# Patient Record
Sex: FEMALE | Race: WHITE | Employment: OTHER | URBAN - NONMETROPOLITAN AREA
[De-identification: names, ages, dates, MRNs, and addresses within clinical notes are randomized per-mention and may not be internally consistent; named-entity substitution may affect disease eponyms.]

---

## 2017-04-14 PROBLEM — I21.4 NSTEMI (NON-ST ELEVATED MYOCARDIAL INFARCTION) (HCC): Status: ACTIVE | Noted: 2017-04-14

## 2017-04-22 PROBLEM — Z95.1 S/P CABG (CORONARY ARTERY BYPASS GRAFT): Status: ACTIVE | Noted: 2017-04-22

## 2017-04-24 PROBLEM — I50.21 ACUTE SYSTOLIC CONGESTIVE HEART FAILURE (HCC): Status: ACTIVE | Noted: 2017-04-24

## 2017-04-24 PROBLEM — E78.5 HYPERLIPIDEMIA: Status: ACTIVE | Noted: 2017-04-24

## 2017-04-24 PROBLEM — I25.5 CARDIOMYOPATHY, ISCHEMIC: Status: ACTIVE | Noted: 2017-04-24

## 2017-04-24 PROBLEM — E66.9 OBESITY: Status: ACTIVE | Noted: 2017-04-24

## 2017-05-31 PROBLEM — K92.2 UGIB (UPPER GASTROINTESTINAL BLEED): Status: ACTIVE | Noted: 2017-05-31

## 2018-03-22 ENCOUNTER — HOSPITAL ENCOUNTER (EMERGENCY)
Age: 73
Discharge: HOME | End: 2018-03-22
Payer: MEDICARE

## 2018-03-22 ENCOUNTER — APPOINTMENT (OUTPATIENT)
Dept: GENERAL RADIOLOGY | Age: 73
End: 2018-03-22
Payer: MEDICARE

## 2018-03-22 VITALS
BODY MASS INDEX: 27.6 KG/M2 | HEART RATE: 92 BPM | HEIGHT: 62 IN | DIASTOLIC BLOOD PRESSURE: 58 MMHG | WEIGHT: 150 LBS | TEMPERATURE: 98 F | OXYGEN SATURATION: 98 % | SYSTOLIC BLOOD PRESSURE: 114 MMHG | RESPIRATION RATE: 16 BRPM

## 2018-03-22 DIAGNOSIS — K56.41 FECAL IMPACTION (HCC): Primary | ICD-10-CM

## 2018-03-22 DIAGNOSIS — K59.00 CONSTIPATION, UNSPECIFIED CONSTIPATION TYPE: ICD-10-CM

## 2018-03-22 PROCEDURE — 99283 EMERGENCY DEPT VISIT LOW MDM: CPT

## 2018-03-22 PROCEDURE — 74019 RADEX ABDOMEN 2 VIEWS: CPT

## 2018-03-22 PROCEDURE — 6370000000 HC RX 637 (ALT 250 FOR IP): Performed by: EMERGENCY MEDICINE

## 2018-03-22 RX ORDER — MAGNESIUM CARB/ALUMINUM HYDROX 105-160MG
TABLET,CHEWABLE ORAL
Status: DISCONTINUED
Start: 2018-03-22 | End: 2018-03-22 | Stop reason: HOSPADM

## 2018-03-22 RX ORDER — DOCUSATE SODIUM 100 MG/1
100 CAPSULE, LIQUID FILLED ORAL 2 TIMES DAILY
Qty: 60 CAPSULE | Refills: 0 | Status: ON HOLD | OUTPATIENT
Start: 2018-03-22 | End: 2020-02-14

## 2018-03-22 RX ORDER — MAGNESIUM CARB/ALUMINUM HYDROX 105-160MG
45 TABLET,CHEWABLE ORAL ONCE
Status: COMPLETED | OUTPATIENT
Start: 2018-03-22 | End: 2018-03-22

## 2018-03-22 RX ADMIN — Medication 45 ML: at 07:10

## 2018-03-22 NOTE — ED NOTES
Soapsuds enema given to patient with 60 ml of mineral oil in it as ordered by Dr. Olu Garcia, RN  03/22/18 7478

## 2019-09-17 ENCOUNTER — HOSPITAL ENCOUNTER (OUTPATIENT)
Dept: NON INVASIVE DIAGNOSTICS | Age: 74
Discharge: HOME | End: 2019-09-17
Payer: MEDICARE

## 2019-09-17 LAB
LV EF: 38 %
LVEF MODALITY: NORMAL

## 2019-09-17 PROCEDURE — 93306 TTE W/DOPPLER COMPLETE: CPT

## 2020-02-13 ENCOUNTER — HOSPITAL ENCOUNTER (INPATIENT)
Age: 75
LOS: 1 days | Discharge: HOME | DRG: 293 | End: 2020-02-15
Attending: INTERNAL MEDICINE | Admitting: INTERNAL MEDICINE
Payer: MEDICARE

## 2020-02-13 ENCOUNTER — APPOINTMENT (OUTPATIENT)
Dept: GENERAL RADIOLOGY | Age: 75
DRG: 293 | End: 2020-02-13
Payer: MEDICARE

## 2020-02-13 LAB
ALBUMIN SERPL-MCNC: 3.8 G/DL (ref 3.5–5.2)
ALP BLD-CCNC: 106 U/L (ref 35–104)
ALT SERPL-CCNC: 42 U/L (ref 0–32)
ANION GAP SERPL CALCULATED.3IONS-SCNC: 16 MMOL/L (ref 7–16)
AST SERPL-CCNC: 39 U/L (ref 0–31)
BILIRUB SERPL-MCNC: 1 MG/DL (ref 0–1.2)
BUN BLDV-MCNC: 17 MG/DL (ref 8–23)
CALCIUM SERPL-MCNC: 8.9 MG/DL (ref 8.6–10.2)
CHLORIDE BLD-SCNC: 107 MMOL/L (ref 98–107)
CO2: 17 MMOL/L (ref 22–29)
CREAT SERPL-MCNC: 0.9 MG/DL (ref 0.5–1)
GFR AFRICAN AMERICAN: >60
GFR NON-AFRICAN AMERICAN: >60 ML/MIN/1.73
GLUCOSE BLD-MCNC: 169 MG/DL (ref 74–99)
HCT VFR BLD CALC: 41.5 % (ref 34–48)
HEMOGLOBIN: 12.9 G/DL (ref 11.5–15.5)
INR BLD: 2.9
MCH RBC QN AUTO: 29.5 PG (ref 26–35)
MCHC RBC AUTO-ENTMCNC: 31.1 % (ref 32–34.5)
MCV RBC AUTO: 94.7 FL (ref 80–99.9)
PDW BLD-RTO: 13.4 FL (ref 11.5–15)
PLATELET # BLD: 193 E9/L (ref 130–450)
PMV BLD AUTO: 9.2 FL (ref 7–12)
POTASSIUM SERPL-SCNC: 4.2 MMOL/L (ref 3.5–5)
PRO-BNP: 4939 PG/ML (ref 0–450)
PROTHROMBIN TIME: 33.4 SEC (ref 9.3–12.4)
RBC # BLD: 4.38 E12/L (ref 3.5–5.5)
SODIUM BLD-SCNC: 140 MMOL/L (ref 132–146)
TOTAL PROTEIN: 6.7 G/DL (ref 6.4–8.3)
TROPONIN: <0.01 NG/ML (ref 0–0.03)
WBC # BLD: 10.1 E9/L (ref 4.5–11.5)

## 2020-02-13 PROCEDURE — 36415 COLL VENOUS BLD VENIPUNCTURE: CPT

## 2020-02-13 PROCEDURE — 80053 COMPREHEN METABOLIC PANEL: CPT

## 2020-02-13 PROCEDURE — 84484 ASSAY OF TROPONIN QUANT: CPT

## 2020-02-13 PROCEDURE — 85027 COMPLETE CBC AUTOMATED: CPT

## 2020-02-13 PROCEDURE — 93005 ELECTROCARDIOGRAM TRACING: CPT | Performed by: EMERGENCY MEDICINE

## 2020-02-13 PROCEDURE — 99285 EMERGENCY DEPT VISIT HI MDM: CPT

## 2020-02-13 PROCEDURE — 71045 X-RAY EXAM CHEST 1 VIEW: CPT

## 2020-02-13 PROCEDURE — 85610 PROTHROMBIN TIME: CPT

## 2020-02-13 PROCEDURE — 83880 ASSAY OF NATRIURETIC PEPTIDE: CPT

## 2020-02-14 PROBLEM — I50.9 HEART FAILURE (HCC): Status: ACTIVE | Noted: 2020-02-14

## 2020-02-14 PROBLEM — I50.23 ACUTE ON CHRONIC SYSTOLIC HEART FAILURE (HCC): Status: ACTIVE | Noted: 2020-02-14

## 2020-02-14 PROBLEM — I48.91 ATRIAL FIBRILLATION WITH RVR (HCC): Status: ACTIVE | Noted: 2020-02-14

## 2020-02-14 LAB
ANION GAP SERPL CALCULATED.3IONS-SCNC: 13 MMOL/L (ref 7–16)
BUN BLDV-MCNC: 14 MG/DL (ref 8–23)
CALCIUM SERPL-MCNC: 8.7 MG/DL (ref 8.6–10.2)
CHLORIDE BLD-SCNC: 105 MMOL/L (ref 98–107)
CO2: 22 MMOL/L (ref 22–29)
CREAT SERPL-MCNC: 0.8 MG/DL (ref 0.5–1)
EKG ATRIAL RATE: 119 BPM
EKG Q-T INTERVAL: 370 MS
EKG QRS DURATION: 124 MS
EKG QTC CALCULATION (BAZETT): 457 MS
EKG R AXIS: 99 DEGREES
EKG T AXIS: -101 DEGREES
EKG VENTRICULAR RATE: 92 BPM
GFR AFRICAN AMERICAN: >60
GFR NON-AFRICAN AMERICAN: >60 ML/MIN/1.73
GLUCOSE BLD-MCNC: 124 MG/DL (ref 74–99)
INR BLD: 2.9
LV EF: 35 %
LVEF MODALITY: NORMAL
POTASSIUM SERPL-SCNC: 3.5 MMOL/L (ref 3.5–5)
PROTHROMBIN TIME: 33.5 SEC (ref 9.3–12.4)
SODIUM BLD-SCNC: 140 MMOL/L (ref 132–146)
TSH SERPL DL<=0.05 MIU/L-ACNC: 4.63 UIU/ML (ref 0.27–4.2)

## 2020-02-14 PROCEDURE — 80048 BASIC METABOLIC PNL TOTAL CA: CPT

## 2020-02-14 PROCEDURE — 93306 TTE W/DOPPLER COMPLETE: CPT

## 2020-02-14 PROCEDURE — 96374 THER/PROPH/DIAG INJ IV PUSH: CPT

## 2020-02-14 PROCEDURE — 97161 PT EVAL LOW COMPLEX 20 MIN: CPT

## 2020-02-14 PROCEDURE — 6370000000 HC RX 637 (ALT 250 FOR IP): Performed by: INTERNAL MEDICINE

## 2020-02-14 PROCEDURE — 99222 1ST HOSP IP/OBS MODERATE 55: CPT | Performed by: INTERNAL MEDICINE

## 2020-02-14 PROCEDURE — 97165 OT EVAL LOW COMPLEX 30 MIN: CPT

## 2020-02-14 PROCEDURE — 85610 PROTHROMBIN TIME: CPT

## 2020-02-14 PROCEDURE — 36415 COLL VENOUS BLD VENIPUNCTURE: CPT

## 2020-02-14 PROCEDURE — 6360000002 HC RX W HCPCS: Performed by: INTERNAL MEDICINE

## 2020-02-14 PROCEDURE — 2140000000 HC CCU INTERMEDIATE R&B

## 2020-02-14 PROCEDURE — 6360000002 HC RX W HCPCS: Performed by: EMERGENCY MEDICINE

## 2020-02-14 PROCEDURE — 2580000003 HC RX 258: Performed by: INTERNAL MEDICINE

## 2020-02-14 PROCEDURE — 96376 TX/PRO/DX INJ SAME DRUG ADON: CPT

## 2020-02-14 PROCEDURE — 84443 ASSAY THYROID STIM HORMONE: CPT

## 2020-02-14 RX ORDER — SODIUM CHLORIDE 0.9 % (FLUSH) 0.9 %
10 SYRINGE (ML) INJECTION PRN
Status: DISCONTINUED | OUTPATIENT
Start: 2020-02-14 | End: 2020-02-15 | Stop reason: HOSPADM

## 2020-02-14 RX ORDER — LISINOPRIL 5 MG/1
2.5 TABLET ORAL DAILY
Status: DISCONTINUED | OUTPATIENT
Start: 2020-02-14 | End: 2020-02-15

## 2020-02-14 RX ORDER — FUROSEMIDE 10 MG/ML
20 INJECTION INTRAMUSCULAR; INTRAVENOUS 2 TIMES DAILY
Status: DISCONTINUED | OUTPATIENT
Start: 2020-02-14 | End: 2020-02-15 | Stop reason: HOSPADM

## 2020-02-14 RX ORDER — ONDANSETRON 2 MG/ML
4 INJECTION INTRAMUSCULAR; INTRAVENOUS EVERY 6 HOURS PRN
Status: DISCONTINUED | OUTPATIENT
Start: 2020-02-14 | End: 2020-02-15 | Stop reason: HOSPADM

## 2020-02-14 RX ORDER — WARFARIN SODIUM 5 MG/1
5 TABLET ORAL
Status: ON HOLD | COMMUNITY
End: 2020-06-27 | Stop reason: SDUPTHER

## 2020-02-14 RX ORDER — CARVEDILOL 6.25 MG/1
12.5 TABLET ORAL 2 TIMES DAILY WITH MEALS
Status: DISCONTINUED | OUTPATIENT
Start: 2020-02-14 | End: 2020-02-15 | Stop reason: HOSPADM

## 2020-02-14 RX ORDER — ATORVASTATIN CALCIUM 40 MG/1
40 TABLET, FILM COATED ORAL DAILY
Status: DISCONTINUED | OUTPATIENT
Start: 2020-02-14 | End: 2020-02-14

## 2020-02-14 RX ORDER — PANTOPRAZOLE SODIUM 40 MG/1
40 TABLET, DELAYED RELEASE ORAL
Status: DISCONTINUED | OUTPATIENT
Start: 2020-02-14 | End: 2020-02-14

## 2020-02-14 RX ORDER — DICYCLOMINE HYDROCHLORIDE 10 MG/1
10 CAPSULE ORAL
Status: DISCONTINUED | OUTPATIENT
Start: 2020-02-14 | End: 2020-02-14

## 2020-02-14 RX ORDER — SODIUM CHLORIDE 0.9 % (FLUSH) 0.9 %
10 SYRINGE (ML) INJECTION EVERY 12 HOURS SCHEDULED
Status: DISCONTINUED | OUTPATIENT
Start: 2020-02-14 | End: 2020-02-15 | Stop reason: HOSPADM

## 2020-02-14 RX ORDER — FUROSEMIDE 10 MG/ML
20 INJECTION INTRAMUSCULAR; INTRAVENOUS ONCE
Status: COMPLETED | OUTPATIENT
Start: 2020-02-14 | End: 2020-02-14

## 2020-02-14 RX ORDER — FOLIC ACID 1 MG/1
1 TABLET ORAL DAILY
Status: DISCONTINUED | OUTPATIENT
Start: 2020-02-14 | End: 2020-02-14

## 2020-02-14 RX ORDER — POTASSIUM CHLORIDE 750 MG/1
10 TABLET, EXTENDED RELEASE ORAL DAILY
Status: DISCONTINUED | OUTPATIENT
Start: 2020-02-14 | End: 2020-02-14

## 2020-02-14 RX ORDER — ASPIRIN 81 MG/1
81 TABLET ORAL DAILY
Status: DISCONTINUED | OUTPATIENT
Start: 2020-02-14 | End: 2020-02-14

## 2020-02-14 RX ORDER — WARFARIN SODIUM 5 MG/1
2.5 TABLET ORAL
Status: COMPLETED | OUTPATIENT
Start: 2020-02-14 | End: 2020-02-14

## 2020-02-14 RX ORDER — DOCUSATE SODIUM 100 MG/1
100 CAPSULE, LIQUID FILLED ORAL 2 TIMES DAILY
Status: DISCONTINUED | OUTPATIENT
Start: 2020-02-14 | End: 2020-02-14

## 2020-02-14 RX ORDER — FERROUS SULFATE 325(65) MG
325 TABLET ORAL 2 TIMES DAILY WITH MEALS
Status: DISCONTINUED | OUTPATIENT
Start: 2020-02-14 | End: 2020-02-14

## 2020-02-14 RX ORDER — CLOPIDOGREL BISULFATE 75 MG/1
75 TABLET ORAL DAILY
Status: DISCONTINUED | OUTPATIENT
Start: 2020-02-14 | End: 2020-02-14

## 2020-02-14 RX ADMIN — Medication 10 ML: at 12:20

## 2020-02-14 RX ADMIN — FUROSEMIDE 20 MG: 10 INJECTION, SOLUTION INTRAMUSCULAR; INTRAVENOUS at 12:14

## 2020-02-14 RX ADMIN — CARVEDILOL 12.5 MG: 6.25 TABLET, FILM COATED ORAL at 18:59

## 2020-02-14 RX ADMIN — FUROSEMIDE 20 MG: 10 INJECTION, SOLUTION INTRAMUSCULAR; INTRAVENOUS at 18:59

## 2020-02-14 RX ADMIN — WARFARIN SODIUM 2.5 MG: 5 TABLET ORAL at 18:59

## 2020-02-14 RX ADMIN — LISINOPRIL 2.5 MG: 5 TABLET ORAL at 09:22

## 2020-02-14 RX ADMIN — CARVEDILOL 12.5 MG: 6.25 TABLET, FILM COATED ORAL at 09:22

## 2020-02-14 RX ADMIN — FUROSEMIDE 20 MG: 10 INJECTION, SOLUTION INTRAMUSCULAR; INTRAVENOUS at 00:25

## 2020-02-14 ASSESSMENT — PAIN SCALES - GENERAL
PAINLEVEL_OUTOF10: 0

## 2020-02-14 NOTE — PROGRESS NOTES
OCCUPATIONAL THERAPY INITIAL EVALUATION      Date:2020  Patient Name: Taylor Pathak  MRN: [id number]  : 1945  Room: [address] [place] [address]    Evaluating OT: KIMBERLEE Wylie, OTR/L [id number]    AM-PAC Daily Activity Raw Score:   Recommended Adaptive Equipment: TBD     Diagnosis: heart failure   Referring Practitioner: Alvera Angelucci, DO  Surgery: n/a  Pertinent Medical History: CAD, CABG ()   Precautions:  Falls     Home Living: Pt lives alone (but niece checks in intermittently in a basement level apartment with 4 step(s) to enter and 0 rail(s); bed/bath on 1 level when entering apartment   Bathroom setup: grab bars, tub/shower unit   Equipment owned: none  Prior Level of Function: IND with ADLs/IADLs; using no AD for functional mobility   Driving: yes    Pain Level: 0/10  Cognition: A&O: 4/4; Follows multi step directions   Memory:  good    Sequencing:  good    Problem solving:  good    Judgement/safety:  fair      Functional Assessment:   Initial Eval Status  Date: 20 Treatment Status  Date: Short Term Goals  Treatment frequency: 2-5x/wk   Feeding IND      Grooming SUP(Standing at sink)   IND   UB Dressing Mod I   SUP   LB Dressing SUP (pt able to use crossing of leg technique to doff/heaven B socks)  IND    Bathing SUP (simulated)  IND    Toileting SUP  IND   Bed Mobility  Log roll: IND  Supine to sit: IND   Sit to supine: IND      Functional Transfers Sit to stand:SUP   Stand to sit:SUP  Commode: SUP  IND   Functional Mobility SUP (no AD, to/from bathroom)  IND   Balance Sitting:     Static:  IND    Dynamic:SUP  Standing: SUP     Activity Tolerance fair     Visual/  Perceptual Glasses: yes                Hand dominance: R  UE ROM: RUE:  WFL  LUE:  WFL  Strength: RUE: grossly 4-/5 LUE: grossly 4-/5   Strength: B WFL  Fine Motor Coordination:  WFL     Hearing: WFL  Sensation:  No c/o numbness/tingling  Tone:  WFL  Edema: BLEs                            Comments:Cleared by RN to see pt.  Upon arrival, patient sitting in chair and agreeable to OT session. At end of session, patient sitting in chair with call light and phone within reach, all lines and tubes intact. Pt appeared to have tolerated session well. Pt appears motivated/cooperative/pleasant. Pt would benefit from continued OT to increase functional independence and quality of life. Eval Complexity: Low    Assessment of current deficits   Functional mobility [x]  ADLs [x] Strength [x]  Cognition []  Functional transfers  [x] IADLs [x] Safety Awareness [x]  Endurance [x]  Fine Motor Coordination [] Balance [x] Vision/perception [] Sensation []   Gross Motor Coordination [] ROM [] Delirium []                  Motor Control []    Plan of Care:   ADL retraining [x]   Equipment needs [x]   Neuromuscular re-education [x] Energy Conservation Techniques [x]  Functional Transfer training [x] Patient and/or Family Education [x]  Functional Mobility training [x]  Environmental Modifications [x]  Cognitive re-training []   Compensatory techniques for ADLs [x]  Splinting Needs []   Positioning to improve overall function [x]   Therapeutic Activity [x]  Therapeutic Exercise  [x]  Visual/Perceptual: []    Delirium prevention/treatment  []   Other:  []    Rehab Potential: Good for established goals, pt. assisted in establishment of goals. LTG: maximize independence with ADLs to return to PLOF    Patient instructed on diagnosis, prognosis/goals and plan of care. Demonstrated fair understanding. [] Malnutrition indicators have been identified and nursing has been notified to ensure a dietitian consult is ordered. Evaluation time includes thorough review of current medical information, gathering information on past medical & social history & PLOF, completion of standardized testing, informal observation of tasks, consultation with other medical professions/disciplines, assessment of data & development of POC/goals.      low Evaluation      Time In: 4:20 Time Out: 4:30           Treatment Charges: Mins Units   Ther Ex  [id number]       Manual Therapy 89131       Thera Activities 20855       ADL/Home Mgt [id number]     Neuro Re-ed 53001       Group Therapy        Orthotic manage/training  21866       Non-Billable Time       Total Timed Treatment 0 0       Jose Anders, OTR/L [id number]

## 2020-02-14 NOTE — PLAN OF CARE
Problem: Falls - Risk of:  Goal: Will remain free from falls  Outcome: Met This Shift  Goal: Absence of physical injury  Outcome: Met This Shift     Problem: OXYGENATION/RESPIRATORY FUNCTION  Goal: Patient will maintain patent airway  Outcome: Met This Shift  Goal: Patient will achieve/maintain normal respiratory rate/effort  Outcome: Met This Shift     Problem: HEMODYNAMIC STATUS  Goal: Patient has stable vital signs and fluid balance  Outcome: Met This Shift     Problem: FLUID AND ELECTROLYTE IMBALANCE  Goal: Fluid and electrolyte balance are achieved/maintained  Outcome: Met This Shift

## 2020-02-14 NOTE — ED NOTES
Bed: 14B-14  Expected date:   Expected time:   Means of arrival:   Comments:  triage     Yue Jc RN  02/13/20 9141

## 2020-02-14 NOTE — CONSULTS
Weak, dizzy,  Presyn, palpitation,   Edema. [facility] consult  Dr. Christina Chi      Reason for Consult: CHF  Requesting Physician: Robbie Mast MD  CHIEF COMPLAINT: Dizziness  History Obtained From: patient  HISTORY OF PRESENT ILLNESS:   Patient is a 77years old female with a history of CAD status post CABG in 2017, cardiomyopathy, chronic systolic congestive heart failure and hyperlipidemia, was admitted to the hospital with dizziness,Was found to have acute exacerbation of congestive heart failure, cardiology  was consulted. Patient stated for the endoscopy of this were to admission she's been having lightheadedness and dizziness, feeling fatigued and tired, progressive shortness of breath, pedal edema, Palpitations,easy fatigability, symptoms started Sunday, got progressively worse, did not respond to outpatient therapy, under the day of admission symptoms were significant enough that prompted her to seek medical attention,Denies any chest pain, no pedal edema, no PND, no orthopnea, no syncope episodes.       Past Medical History:   Diagnosis Date    Acute systolic congestive heart failure ([address] 4/24/2017    CAD (coronary artery disease)     History of echocardiogram 04/14/2017    EF 26%    Hyperlipidemia 4/24/2017       Past Surgical History:   Procedure Laterality Date    CARDIAC CATHETERIZATION  04/17/2017    Dr. Frieda Krishnan GRAFT  04/17/2017         Current Facility-Administered Medications:     carvedilol (COREG) tablet 12.5 mg, 12.5 mg, Oral, BID WC, Nicolabecca Conde, DO, 12.5 mg at 02/15/20 7181    lisinopril (PRINIVIL;ZESTRIL) tablet 2.5 mg, 2.5 mg, Oral, Daily, Nicola Conde, DO, 2.5 mg at 02/15/20 6607    sodium chloride flush 0.9 % injection 10 mL, 10 mL, Intravenous, 2 times per day, Nicola Amaya, DO, 10 mL at 02/15/20 5850    sodium chloride flush 0.9 % injection 10 mL, 10 mL, Intravenous, PRN, Nicola Conde DO    magnesium hydroxide (MILK OF MAGNESIA) 400 MG/5ML suspension 30 mL, 30 mL, Oral, Daily PRN, Virginia Held, DO    ondansetron [facility]) injection 4 mg, 4 mg, Intravenous, Q6H PRN, Virginia Held, DO    furosemide (LASIX) injection 20 mg, 20 mg, Intravenous, BID, Virginia Held, DO, 20 mg at 02/15/20 3829    perflutren lipid microspheres (DEFINITY) injection 1.65 mg, 1.5 mL, Intravenous, ONCE PRN, Swati Staley MD    warfarin (COUMADIN) daily dosing (placeholder), , Other, RX Placeholder, Bubba Miguel MD    Allergies as of 2020    (No Known Allergies)       Social History     Socioeconomic History    Marital status:       Spouse name: Not on file    Number of children: Not on file    Years of education: Not on file    Highest education level: Not on file   Occupational History    Not on file   Social Needs    Financial resource strain: Not on file    Food insecurity:     Worry: Not on file     Inability: Not on file    Transportation needs:     Medical: Not on file     Non-medical: Not on file   Tobacco Use    Smoking status: Former Smoker     Last attempt to quit: 2002     Years since quittin.8    Smokeless tobacco: Never Used   Substance and Sexual Activity    Alcohol use: No    Drug use: No    Sexual activity: Never   Lifestyle    Physical activity:     Days per week: Not on file     Minutes per session: Not on file    Stress: Not on file   Relationships    Social connections:     Talks on phone: Not on file     Gets together: Not on file     Attends Rastafari service: Not on file     Active member of club or organization: Not on file     Attends meetings of clubs or organizations: Not on file     Relationship status: Not on file    Intimate partner violence:     Fear of current or ex partner: Not on file     Emotionally abused: Not on file     Physically abused: Not on file     Forced sexual activity: Not on file   Other Topics Concern    Not on file   Social History Narrative    Not on file       Family medical history for CAD      REVIEW OF SYSTEMS:     CONSTITUTIONAL:  negative for  fevers, chills, sweats, +fatigue  EYES:  negative for  double vision, blurred vision and blind spots  HEENT:  negative for  tinnitus, earaches, nasal congestion and epistaxis  RESPIRATORY:  +  dry cough, cough with sputum, dyspnea, Negative for wheezing and hemoptysis  CARDIOVASCULAR: as per HPI  GASTROINTESTINAL:  negative for nausea, vomiting, diarrhea, constipation, pruritus and jaundice  GENITOURINARY:  negative for frequency, dysuria, nocturia, urinary incontinence and hesitancy  HEMATOLOGIC/LYMPHATIC:  negative for easy bruising, bleeding, lymphadenopathy and petechiae  ALLERGIC/IMMUNOLOGIC:  negative for urticaria, hay fever and angioedema  ENDOCRINE:  negative for heat intolerance, cold intolerance, tremor, hair loss and diabetic symptoms including neither polyuria nor polydipsia nor blurred vision  MUSCULOSKELETAL:  negative for  myalgias, arthralgias, joint swelling, stiff joints and decreased range of motion  NEUROLOGICAL:  negative for memory problems, speech problems, visual disturbance, dysphagia, + Generalized weakness      PHYSICAL EXAM:   CONSTITUTIONAL:  awake, alert, cooperative, no apparent distress, and appears stated age  EYES:  lids and lashes normal and pupils equal, round and reactive to light, anicteric sclerae  HEAD:  normocepalic, without obvious abnormality, atraumatic, pink, moist mucous membranes.   NECK:  Supple, symmetrical, trachea midline, no adenopathy, thyroid symmetric, not enlarged and no tenderness, skin normal  HEMATOLOGIC/LYMPHATICS:  no cervical lymphadenopathy and no supraclavicular lymphadenopathy  LUNGS:  No increased work of breathing, good air exchange, clear to auscultation bilaterally, no crackles or wheezing  CARDIOVASCULAR:  Normal apical impulse,Irregularly irregular, normal S1 and S2, no S3 , 3/6 systolic murmur at the apex, 3/6 systolic murmur at the left lower sternal border,no JVD, no carotid bruit, + pedal edema, good carotid upstroke bilaterally. ABDOMEN:  Soft, nontender, no masses, no hepatomegaly or splenomegaly, BS+  CHEST: nontender to palpation, expands symmetrically  MUSCULOSKELETAL:  No clubbing no cyanosis. there is no redness, warmth, or swelling of the joints  full range of motion noted  NEUROLOGIC:  Alert, awake,oriented x3, no focal neurologic deficit was appreciated  SKIN:  no bruising or bleeding, normal skin color, texture, turgor and no redness, warmth, or swelling        /60   Pulse 84   Temp 98 °F (36.7 °C) (Temporal)   Resp 18   Ht 5' 2\" (1.575 m)   Wt 167 lb 3 oz (75.8 kg)   SpO2 95%   BMI 30.58 kg/m²     DATA:   I personally reviewed the admission EKG with the following interpretation:Atrial fibrillation with a controlled ventricular response, nonspecific intraventricular conduction delay, nonspecific T-wave changes, when compared to previous, atrial fibrillation has replaced sinus rhythm    ECHO: 2/14/2020,Summary   No previous echo for comparison. Technically adequate study. Normal left ventricular wall thickness. Ejection fraction is visually estimated at 35%. Abnormal (paradoxical) motion consistent with conduction abnormality. Mild to moderate mitral regurgitation is present. Moderate tricuspid regurgitation.    RVSP is normal.       Stress Test: Not performed to date  Angiography: Not performed to date  Cardiology Labs:   BMP:    Lab Results   Component Value Date     02/15/2020    K 3.8 02/15/2020     02/15/2020    CO2 22 02/15/2020    BUN 15 02/15/2020     CMP:    Lab Results   Component Value Date     02/15/2020    K 3.8 02/15/2020     02/15/2020    CO2 22 02/15/2020    BUN 15 02/15/2020    PROT 6.7 02/13/2020     CBC:    Lab Results   Component Value Date    WBC 9.4 02/15/2020    RBC 4.21 02/15/2020    HGB 12.6 02/15/2020    HCT 40.1 02/15/2020    MCV 95.2 02/15/2020    RDW 13.4 02/15/2020 treatment  2. Continue aspirin  3. Will increase Coreg dose if heart rate is not controlled  4. need life vest, Repeat echocardiogram in 3 months to reevaluate her ejection fraction and to evaluate her candidacy for ICD  5. Further recommendations will be forthcoming pending her clinical course and diagnostic test findings    I have reviewed my findings and recommendations with patient    Thank you for the consult  Electronically signed by Lawrence Canseco MD on 2/15/2020 at 10:06 AM  NOTE: This report was transcribed using voice recognition software.  Every effort was made to ensure accuracy; however, inadvertent computerized transcription errors may be present

## 2020-02-14 NOTE — H&P
[address] [facility]  History and Physical      CHIEF COMPLAINT: Lightheadedness      HISTORY OF PRESENT ILLNESS:      The patient is a 77 y.o. female patient of Dr. Mendoza Rear history of CHF, CAD, A. fib on Coumadin who presents with lightheadedness. Patient has had 2 days of worsening lightheadedness associated with some shortness of breath. States just before going to bed she got the feeling that she could not breath. Since then the SOB has resolved, she is still having some lightheadedness.  + Orthopnea or PND. Heart palpitations and racing.     Past Medical History:    Past Medical History:   Diagnosis Date    Acute systolic congestive heart failure ([address] 4/24/2017    CAD (coronary artery disease)     History of echocardiogram 04/14/2017    EF 26%    Hyperlipidemia 4/24/2017       Past Surgical History:    Past Surgical History:   Procedure Laterality Date    CARDIAC CATHETERIZATION  04/17/2017    Dr. Nestor Arroyo  04/17/2017       Medications Prior to Admission:    Medications Prior to Admission: warfarin (COUMADIN) 5 MG tablet, Take 5 mg by mouth Pt  is taking 5 mg alternating with 2.5 mg every other day  [DISCONTINUED] docusate sodium (DULCOLAX) 100 MG capsule, Take 1 capsule by mouth 2 times daily  [DISCONTINUED] Glycerin, Laxative, (GLYCERIN, ADULT,) 2 g SUPP suppository, Place 1 suppository rectally daily as needed for Constipation  [DISCONTINUED] dicyclomine (BENTYL) 10 MG capsule, Take 1 capsule by mouth 4 times daily (before meals and nightly)  [DISCONTINUED] loperamide (RA ANTI-DIARRHEAL) 2 MG capsule, Take 1 capsule by mouth 4 times daily as needed for Diarrhea  [DISCONTINUED] ferrous sulfate 325 (65 FE) MG tablet, Take 1 tablet by mouth 2 times daily (with meals)  [DISCONTINUED] pantoprazole (PROTONIX) 40 MG tablet, Take 1 tablet by mouth 2 times daily  lisinopril (PRINIVIL;ZESTRIL) 2.5 MG tablet, Take 1 tablet by mouth daily  carvedilol (COREG) 12.5 MG tablet, Take 1 tablet by mouth 2 times daily (with meals)  [DISCONTINUED] aspirin 81 MG EC tablet, Take 1 tablet by mouth daily  [DISCONTINUED] furosemide (LASIX) 20 MG tablet, Take 1 tablet by mouth daily  [DISCONTINUED] potassium chloride (KLOR-CON M) 10 MEQ extended release tablet, Take 1 tablet by mouth daily  [DISCONTINUED] folic acid (FOLVITE) 1 MG tablet, Take 1 tablet by mouth daily  [DISCONTINUED] clopidogrel (PLAVIX) 75 MG tablet, Take 1 tablet by mouth daily  [DISCONTINUED] vitamin C (VITAMIN C) 500 MG tablet, Take 1 tablet by mouth 2 times daily  [DISCONTINUED] atorvastatin (LIPITOR) 40 MG tablet, Take 1 tablet by mouth daily    Allergies:    Patient has no known allergies. Social History:    reports that she quit smoking about 17 years ago. She has never used smokeless tobacco. She reports that she does not drink alcohol or use drugs. Family History:   family history is not on file. REVIEW OF SYSTEMS:  As above in the HPI, otherwise negative    PHYSICAL EXAM:    Vitals:  /82   Pulse 74   Temp 97.4 °F (36.3 °C) (Temporal)   Resp 20   Ht 5' 2\" (1.575 m)   Wt 173 lb (78.5 kg)   SpO2 97%   BMI 31.64 kg/m²     General:  Awake, alert, oriented X 3. Well developed, well nourished, well groomed. No apparent distress. HEENT:  Normocephalic, atraumatic. Pupils equal, round, reactive to light. No scleral icterus. No conjunctival injection. Normal lips, teeth, and gums. No nasal discharge. Neck:  Supple  Heart:  RRR, no murmurs, gallops, rubs  Lungs:  CTA bilaterally, bilat symmetrical expansion, no wheeze, rales, or rhonchi  Abdomen:   Bowel sounds present, soft, nontender, no masses, no organomegaly, no peritoneal signs  Extremities:  No clubbing, cyanosis, or edema  Skin:  Warm and dry, no open lesions or rash  Neuro:  Cranial nerves 2-12 intact, no focal deficits  Breast: deferred  Rectal: deferred  Genitalia:  deferred    LABS:    CBC with Differential:    Lab Results   Component Value Date    WBC 10.1 02/13/2020    RBC 4.38 02/13/2020    HGB 12.9 02/13/2020    HCT 41.5 02/13/2020     02/13/2020    MCV 94.7 02/13/2020    MCH 29.5 02/13/2020    MCHC 31.1 02/13/2020    RDW 13.4 02/13/2020    LYMPHOPCT 14.1 01/26/2018    MONOPCT 10.5 01/26/2018    BASOPCT 0.2 01/26/2018    MONOSABS 1.30 01/26/2018    LYMPHSABS 1.75 01/26/2018    EOSABS 0.04 01/26/2018    BASOSABS 0.03 01/26/2018     CMP:    Lab Results   Component Value Date     02/14/2020    K 3.5 02/14/2020     02/14/2020    CO2 22 02/14/2020    BUN 14 02/14/2020    CREATININE 0.8 02/14/2020    GFRAA >60 02/14/2020    LABGLOM >60 02/14/2020    GLUCOSE 124 02/14/2020    PROT 6.7 02/13/2020    LABALBU 3.8 02/13/2020    CALCIUM 8.7 02/14/2020    BILITOT 1.0 02/13/2020    ALKPHOS 106 02/13/2020    AST 39 02/13/2020    ALT 42 02/13/2020     Magnesium:    Lab Results   Component Value Date    MG 2.3 04/21/2017     Phosphorus:    Lab Results   Component Value Date    PHOS 3.2 04/15/2017     PT/INR:    Lab Results   Component Value Date    PROTIME 33.5 02/14/2020    INR 2.9 02/14/2020     Last 3 Troponin:    Lab Results   Component Value Date    TROPONINI <0.01 02/13/2020    TROPONINI <0.01 11/06/2017    TROPONINI 0.58 04/16/2017     U/A:    Lab Results   Component Value Date    COLORU Yellow 01/26/2018    PROTEINU Negative 01/26/2018    PHUR 5.0 01/26/2018    LABCAST RARE 01/26/2018    WBCUA 2-5 01/26/2018    RBCUA 2-5 01/26/2018    MUCUS Present 01/26/2018    BACTERIA MANY 01/26/2018    CLARITYU SL CLOUDY 01/26/2018    SPECGRAV 1.020 01/26/2018    LEUKOCYTESUR SMALL 01/26/2018    UROBILINOGEN 0.2 01/26/2018    BILIRUBINUR SMALL 01/26/2018    BLOODU MODERATE 01/26/2018    GLUCOSEU Negative 01/26/2018     ABG:    Lab Results   Component Value Date    PH 7.396 04/20/2017    PCO2 39.6 04/20/2017    PO2 110.1 04/20/2017    HCO3 23.8 04/20/2017    BE -1.0 04/20/2017    O2SAT 98.4 04/20/2017     HgBA1c:    Lab Results   Component Value

## 2020-02-14 NOTE — PLAN OF CARE
Problem: Falls - Risk of:  Goal: Will remain free from falls  Description  Will remain free from falls  2/14/2020 1604 by Michael Avendano RN  Outcome: Met This Shift  2/14/2020 0240 by Rodger Gay RN  Outcome: Met This Shift  Goal: Absence of physical injury  Description  Absence of physical injury  2/14/2020 1604 by Michael Avendano RN  Outcome: Met This Shift  2/14/2020 0240 by Rodger Gay RN  Outcome: Met This Shift

## 2020-02-14 NOTE — PROGRESS NOTES
Physical Therapy  Physical Therapy Initial Assessment     Name: Janet Dominguez  : 1945  MRN: [id number]    Referring Provider:  Meghan Alas DO    Date of Service: 2020    Evaluating PT:  Chantelle Beltran, PT, DPT [id number]    Room #:  4410/0767-Q  Diagnosis:  Heart failure  Precautions: Falls  Procedure/Surgery:  NA  PMHx/PSHx:  CAD, CHF, HLD  Equipment Needs:  None at this time    SUBJECTIVE:    Pt lives alone in a basement level apartment with 4 stairs to enter and no rails. Pt ambulated with no device and was independent PTA. Pt reported being a \"walker\" and active. OBJECTIVE:   Initial Evaluation  Date: 20 Treatment Short Term/ Long Term   Goals   AM-PAC 6 Clicks      Was pt agreeable to Eval/treatment? Yes     Does pt have pain? No c/o pain     Bed Mobility  Rolling: NT  Supine to sit: Mod Independent  Sit to supine: NT  Scooting: Independent     Transfers Sit to stand: Supervision  Stand to sit: Supervision  Stand pivot: Supervision no device  Independent   Ambulation   200 feet with Supervision no device  >400 feet Independently   Stair negotiation: ascended and descended 4 steps with 1 rail Superivison  >4 steps with 1 rail Mod Independent   ROM BUE:  Defer to OT note  BLE:  WNL     Strength BUE:  Defer to OT note  BLE:  4/5  Increase by 1/3 MMT grade   Balance Sitting EOB:  Independent  Dynamic Standing:  Supervision no device  Sitting EOB:  NA  Dynamic Standing:  Independent     Pt is A & O x 4  Sensation:  WNL  Edema:  WNL    Therapeutic Exercises:  NA    Patient education  Pt educated on safety    Patient response to education:   Pt verbalized understanding Pt demonstrated skill Pt requires further education in this area   x x x     ASSESSMENT:    Comments:  Pt was supine in bed upon arrival, agreeable to initial evaluation. Pt was educated on static positioning after position changes to decrease chance of symptoms (lightheadedness, dizziness).   Pt ambulated with decreased gait speed and reached for hallway railing occasionally. Reciprocal pattern observed when ascending steps and non-reciprocal pattern when descending. Pt was returned to sitting in chair with all needs met and call light in reach. Will add to gait team.    Treatment:  Patient practiced and was instructed in the following treatment:     Sitting EOB for >3 minutes to ensure no occurrence of symptoms   Educated on static positioning after position changes    Pt's/ family goals   1. Return home    Patient and or family understand(s) diagnosis, prognosis, and plan of care. Yes    PLAN:    PT care will be provided in accordance with the objectives noted above. Exercises and functional mobility practice will be used as well as appropriate assistive devices or modalities to obtain goals. Patient and family education will also be administered as needed. Frequency of treatments: 2-5x/week x 1-2 weeks. Time in  0800  Time out  0810    Total Treatment Time  0 minutes     Evaluation Time includes thorough review of current medical information, gathering information on past medical history/social history and prior level of function, completion of standardized testing/informal observation of tasks, assessment of data and education on plan of care and goals.     CPT codes:  [x] Low Complexity PT evaluation 86075  [] Moderate Complexity PT evaluation 98722  [] High Complexity PT evaluation 84588  [] PT Re-evaluation 36982  [] Gait training 77270 - minutes  [] Manual therapy 01124 - minutes  [] Therapeutic activities 89682 - minutes  [] Therapeutic exercises 37005 - minutes  [] Neuromuscular reeducation 76421 - minutes     Melissa Coe, PT, DPT  [id number]

## 2020-02-14 NOTE — ED PROVIDER NOTES
- 16 mmol/L    Glucose 169 (H) 74 - 99 mg/dL    BUN 17 8 - 23 mg/dL    CREATININE 0.9 0.5 - 1.0 mg/dL    GFR Non-African American >60 >=60 mL/min/1.73    GFR African American >60     Calcium 8.9 8.6 - 10.2 mg/dL    Total Protein 6.7 6.4 - 8.3 g/dL    Alb 3.8 3.5 - 5.2 g/dL    Total Bilirubin 1.0 0.0 - 1.2 mg/dL    Alkaline Phosphatase 106 (H) 35 - 104 U/L    ALT 42 (H) 0 - 32 U/L    AST 39 (H) 0 - 31 U/L   Troponin   Result Value Ref Range    Troponin <0.01 0.00 - 0.03 ng/mL   Brain Natriuretic Peptide   Result Value Ref Range    Pro-BNP 4,939 (H) 0 - 450 pg/mL   Protime-INR   Result Value Ref Range    Protime 33.4 (H) 9.3 - 12.4 sec    INR 2.9    EKG 12 Lead   Result Value Ref Range    Ventricular Rate 92 BPM    Atrial Rate 119 BPM    QRS Duration 124 ms    Q-T Interval 370 ms    QTc Calculation (Bazett) 457 ms    R Axis 99 degrees    T Axis -101 degrees       RADIOLOGY:  Interpreted by Radiologist unless otherwise specified. XR CHEST PORTABLE   Final Result   Discrete residual changes or minimal left-sided pleural   effusion. Borderline size heart.          ------------------------- NURSING NOTES AND VITALS REVIEWED ---------------------------  The nursing notes within the ED encounter and vital signs as below have been reviewed by myself. BP (!) 146/85   Pulse 78   Temp 97.6 °F (36.4 °C) (Temporal)   Resp 20   Ht 5' 2\" (1.575 m)   Wt 173 lb (78.5 kg)   SpO2 96%   BMI 31.64 kg/m²   Oxygen Saturation Interpretation: Normal    The cardiac monitor revealed NSR with a heart rate in the 100s as interpreted by me. The cardiac monitor was ordered secondary to the patient's heart rate and to monitor the patient for dysrhythmia. CPT 58433    The patients available past medical records and past encounters were reviewed.       ------------------------------ ED COURSE/MEDICAL DECISION MAKING----------------------  Medications   aspirin EC tablet 81 mg (has no administration in time range)   atorvastatin addition to providing specific details for the plan of care and counseling regarding the diagnosis and prognosis. Questions are answered at this time and they are agreeable with the plan.    --------------------------------- IMPRESSION AND DISPOSITION ---------------------------------  IMPRESSION  1. Congestive heart failure, unspecified HF chronicity, unspecified heart failure type ([facility] [address]    2. Near syncope        DISPOSITION  Disposition: Admit to telemetry  Patient condition is stable    2/13/20, 11:09 PM.    This note is prepared by Rafa Rosenthal, acting as Scribe for Jennifer Macario MD.    Jennifer Macario MD:  The scribe's documentation has been prepared under my direction and personally reviewed by me in its entirety. I confirm that the note above accurately reflects all work, treatment, procedures, and medical decision making performed by me.              Jennifer Macario MD  02/14/20 3340       Jennifer Macario MD  02/14/20 8630       Jennifer Macario MD  02/14/20 2698

## 2020-02-15 VITALS
TEMPERATURE: 98 F | RESPIRATION RATE: 18 BRPM | SYSTOLIC BLOOD PRESSURE: 138 MMHG | HEIGHT: 62 IN | HEART RATE: 88 BPM | OXYGEN SATURATION: 95 % | WEIGHT: 167.19 LBS | DIASTOLIC BLOOD PRESSURE: 82 MMHG | BODY MASS INDEX: 30.77 KG/M2

## 2020-02-15 LAB
ANION GAP SERPL CALCULATED.3IONS-SCNC: 13 MMOL/L (ref 7–16)
BASOPHILS ABSOLUTE: 0.05 E9/L (ref 0–0.2)
BASOPHILS RELATIVE PERCENT: 0.5 % (ref 0–2)
BUN BLDV-MCNC: 15 MG/DL (ref 8–23)
CALCIUM SERPL-MCNC: 8.5 MG/DL (ref 8.6–10.2)
CHLORIDE BLD-SCNC: 102 MMOL/L (ref 98–107)
CO2: 22 MMOL/L (ref 22–29)
CREAT SERPL-MCNC: 0.9 MG/DL (ref 0.5–1)
EOSINOPHILS ABSOLUTE: 0.12 E9/L (ref 0.05–0.5)
EOSINOPHILS RELATIVE PERCENT: 1.3 % (ref 0–6)
GFR AFRICAN AMERICAN: >60
GFR NON-AFRICAN AMERICAN: >60 ML/MIN/1.73
GLUCOSE BLD-MCNC: 106 MG/DL (ref 74–99)
HCT VFR BLD CALC: 40.1 % (ref 34–48)
HEMOGLOBIN: 12.6 G/DL (ref 11.5–15.5)
IMMATURE GRANULOCYTES #: 0.02 E9/L
IMMATURE GRANULOCYTES %: 0.2 % (ref 0–5)
INR BLD: 2.2
LYMPHOCYTES ABSOLUTE: 4.89 E9/L (ref 1.5–4)
LYMPHOCYTES RELATIVE PERCENT: 52.2 % (ref 20–42)
MCH RBC QN AUTO: 29.9 PG (ref 26–35)
MCHC RBC AUTO-ENTMCNC: 31.4 % (ref 32–34.5)
MCV RBC AUTO: 95.2 FL (ref 80–99.9)
MONOCYTES ABSOLUTE: 0.64 E9/L (ref 0.1–0.95)
MONOCYTES RELATIVE PERCENT: 6.8 % (ref 2–12)
NEUTROPHILS ABSOLUTE: 3.65 E9/L (ref 1.8–7.3)
NEUTROPHILS RELATIVE PERCENT: 39 % (ref 43–80)
PDW BLD-RTO: 13.4 FL (ref 11.5–15)
PLATELET # BLD: 165 E9/L (ref 130–450)
PMV BLD AUTO: 8.8 FL (ref 7–12)
POTASSIUM REFLEX MAGNESIUM: 3.8 MMOL/L (ref 3.5–5)
PROTHROMBIN TIME: 25.2 SEC (ref 9.3–12.4)
RBC # BLD: 4.21 E12/L (ref 3.5–5.5)
REASON FOR REJECTION: NORMAL
REJECTED TEST: NORMAL
SODIUM BLD-SCNC: 137 MMOL/L (ref 132–146)
WBC # BLD: 9.4 E9/L (ref 4.5–11.5)

## 2020-02-15 PROCEDURE — 85610 PROTHROMBIN TIME: CPT

## 2020-02-15 PROCEDURE — 85025 COMPLETE CBC W/AUTO DIFF WBC: CPT

## 2020-02-15 PROCEDURE — 2580000003 HC RX 258: Performed by: INTERNAL MEDICINE

## 2020-02-15 PROCEDURE — 99232 SBSQ HOSP IP/OBS MODERATE 35: CPT | Performed by: INTERNAL MEDICINE

## 2020-02-15 PROCEDURE — 36415 COLL VENOUS BLD VENIPUNCTURE: CPT

## 2020-02-15 PROCEDURE — 96376 TX/PRO/DX INJ SAME DRUG ADON: CPT

## 2020-02-15 PROCEDURE — 6370000000 HC RX 637 (ALT 250 FOR IP): Performed by: INTERNAL MEDICINE

## 2020-02-15 PROCEDURE — 6360000002 HC RX W HCPCS: Performed by: INTERNAL MEDICINE

## 2020-02-15 PROCEDURE — 80048 BASIC METABOLIC PNL TOTAL CA: CPT

## 2020-02-15 RX ORDER — FUROSEMIDE 20 MG/1
20 TABLET ORAL DAILY
Qty: 30 TABLET | Refills: 1 | Status: SHIPPED | OUTPATIENT
Start: 2020-02-15 | End: 2020-02-15

## 2020-02-15 RX ORDER — LISINOPRIL 5 MG/1
5 TABLET ORAL DAILY
Status: DISCONTINUED | OUTPATIENT
Start: 2020-02-16 | End: 2020-02-15 | Stop reason: HOSPADM

## 2020-02-15 RX ORDER — ATORVASTATIN CALCIUM 40 MG/1
40 TABLET, FILM COATED ORAL DAILY
Qty: 30 TABLET | Refills: 3 | Status: SHIPPED | OUTPATIENT
Start: 2020-02-15 | End: 2020-02-15

## 2020-02-15 RX ORDER — ATORVASTATIN CALCIUM 40 MG/1
40 TABLET, FILM COATED ORAL DAILY
Qty: 30 TABLET | Refills: 3 | Status: SHIPPED | OUTPATIENT
Start: 2020-02-15

## 2020-02-15 RX ORDER — FUROSEMIDE 20 MG/1
20 TABLET ORAL DAILY
Qty: 30 TABLET | Refills: 1 | Status: SHIPPED | OUTPATIENT
Start: 2020-02-15 | End: 2020-09-02 | Stop reason: SDUPTHER

## 2020-02-15 RX ADMIN — CARVEDILOL 12.5 MG: 6.25 TABLET, FILM COATED ORAL at 09:04

## 2020-02-15 RX ADMIN — CARVEDILOL 12.5 MG: 6.25 TABLET, FILM COATED ORAL at 16:56

## 2020-02-15 RX ADMIN — FUROSEMIDE 20 MG: 10 INJECTION, SOLUTION INTRAMUSCULAR; INTRAVENOUS at 09:04

## 2020-02-15 RX ADMIN — Medication 10 ML: at 09:04

## 2020-02-15 RX ADMIN — FUROSEMIDE 20 MG: 10 INJECTION, SOLUTION INTRAMUSCULAR; INTRAVENOUS at 17:01

## 2020-02-15 RX ADMIN — LISINOPRIL 2.5 MG: 5 TABLET ORAL at 09:04

## 2020-02-15 ASSESSMENT — PAIN SCALES - GENERAL
PAINLEVEL_OUTOF10: 0
PAINLEVEL_OUTOF10: 0

## 2020-02-15 NOTE — PLAN OF CARE
Problem: Falls - Risk of:  Goal: Will remain free from falls  Description  Will remain free from falls  Outcome: Met This Shift     Problem: OXYGENATION/RESPIRATORY FUNCTION  Goal: Patient will maintain patent airway  Outcome: Met This Shift     Problem: FLUID AND ELECTROLYTE IMBALANCE  Goal: Fluid and electrolyte balance are achieved/maintained  Outcome: Ongoing

## 2020-02-15 NOTE — PROGRESS NOTES
Subjective:  Feeling better   No CP or SOB  No fever or chills   No uncontrolled pain  No vomiting or diarrhea     Objective:    /60   Pulse 84   Temp 98 °F (36.7 °C) (Temporal)   Resp 18   Ht 5' 2\" (1.575 m)   Wt 167 lb 3 oz (75.8 kg)   SpO2 95%   BMI 30.58 kg/m²     24HR INTAKE/OUTPUT:      Intake/Output Summary (Last 24 hours) at 2/15/2020 1259  Last data filed at 2/15/2020 1048  Gross per 24 hour   Intake 840 ml   Output 2850 ml   Net -2010 ml     nad  Heart:  RRR, no murmurs, gallops, or rubs. Lungs:  CTA bilaterally, no wheeze, rales or rhonchi  Abd: bowel sounds present, nontender, nondistended, no masses  Extrem:  No clubbing, cyanosis, or edema    Most Recent Labs  Lab Results   Component Value Date    WBC 9.4 02/15/2020    HGB 12.6 02/15/2020    HCT 40.1 02/15/2020     02/15/2020     02/15/2020    K 3.8 02/15/2020     02/15/2020    CREATININE 0.9 02/15/2020    BUN 15 02/15/2020    CO2 22 02/15/2020    GLUCOSE 106 (H) 02/15/2020    ALT 42 (H) 02/13/2020    AST 39 (H) 02/13/2020    INR 2.2 02/15/2020    TSH 4.630 (H) 02/14/2020    LABA1C 5.7 04/15/2017     No results for input(s): MG in the last 72 hours. Lab Results   Component Value Date    CALCIUM 8.5 (L) 02/15/2020    PHOS 3.2 04/15/2017        XR CHEST PORTABLE   Final Result   Discrete residual changes or minimal left-sided pleural   effusion. Borderline size heart. Assessment    Principal Problem:    Acute on chronic systolic heart failure (HCC)  Active Problems:    Cardiomyopathy, ischemic    Obesity    Hyperlipidemia    Atrial fibrillation with RVR ([address]  Resolved Problems:    * No resolved hospital problems. *      Plan:  Admit monitored bed  IV Lasix  Monitor I's and O's  Daily weights  Monitor labs closely   Echo 9/2019- EF 35-40%.  Global wall hypokinesis   Indeterminate diastolic function.   Cardiology consult  Medications for other co morbidities cont as appropriate w dosage adjustments as

## 2020-02-15 NOTE — DISCHARGE SUMMARY
Physician Discharge Summary     Patient ID:  Kelsey Joaquin  [id number]  77 y.o.  1945    Admit date: 2/13/2020    Discharge date and time:  2/15/2020    Discharge Diagnoses: Principal Problem:    Acute on chronic systolic heart failure (HCC)  Active Problems:    Cardiomyopathy, ischemic    Obesity    Hyperlipidemia    Atrial fibrillation with RVR ([address]  Resolved Problems:    * No resolved hospital problems. *      Consults: IP CONSULT TO HOSPITALIST  IP CONSULT TO CARDIOLOGY  IP CONSULT TO HOME CARE NEEDS    Procedures: nad    Hospital Course: Admit monitored bed  IV Lasix  Monitor I's and O's  Daily weights  Monitor labs closely   Echo 9/2019- EF 35-40%.  Global wall hypokinesis   Indeterminate diastolic function.   Cardiology Consult appreciated =-- GDMT adjusted  Medications for other co morbidities cont as appropriate w dosage adjustments as necessary  PT/OT  DVT PPx  DC planning-- pt declines lifevest RBA iscussed    Discharge Exam:  See progress note from today    Condition:  Stable    Disposition: home    Patient Instructions:   Current Discharge Medication List      CONTINUE these medications which have CHANGED    Details   atorvastatin (LIPITOR) 40 MG tablet Take 1 tablet by mouth daily  Qty: 30 tablet, Refills: 3      furosemide (LASIX) 20 MG tablet Take 1 tablet by mouth daily  Qty: 30 tablet, Refills: 1         CONTINUE these medications which have NOT CHANGED    Details   warfarin (COUMADIN) 5 MG tablet Take 5 mg by mouth Pt  is taking 5 mg alternating with 2.5 mg every other day      lisinopril (PRINIVIL;ZESTRIL) 2.5 MG tablet Take 1 tablet by mouth daily  Qty: 30 tablet, Refills: 1      carvedilol (COREG) 12.5 MG tablet Take 1 tablet by mouth 2 times daily (with meals)  Qty: 60 tablet, Refills: 1         STOP taking these medications       docusate sodium (DULCOLAX) 100 MG capsule Comments:   Reason for Stopping:         Glycerin, Laxative, (GLYCERIN, ADULT,) 2 g SUPP suppository Comments:

## 2020-02-15 NOTE — PROGRESS NOTES
improving. She still has mild lower extremity edema but is asking to be discharged. I discussed with Dr. Amber Phillips and will give her 1 more dose of IV Lasix this afternoon and then return her to her usual oral dose beginning tomorrow for discharge. 5. Mild to moderate mitral regurgitation  6. Hypertension, well controlled. With her cardiomyopathy I will increase her lisinopril from 2.5 to 5 mg.

## 2020-02-17 ENCOUNTER — TELEPHONE (OUTPATIENT)
Dept: CARDIOLOGY CLINIC | Age: 75
End: 2020-02-17

## 2020-02-20 ENCOUNTER — TELEPHONE (OUTPATIENT)
Dept: CARDIOLOGY CLINIC | Age: 75
End: 2020-02-20

## 2020-02-20 NOTE — TELEPHONE ENCOUNTER
Called pt to schedule the hospital f/u and she said she has to check with her niece. She asked if it's mandatory because she has trouble with directions. I said was strongly suggest and she said okay she'll check with her niece.  She's going to call me back

## 2020-03-01 NOTE — PROGRESS NOTES
[facility] Cardiology Progress Note  Dr. Maribel Howard      Referring Physician: Holly Layne MD  CHIEF COMPLAINT:   Chief Complaint   Patient presents with    Follow-Up from [facility]. Pt has no cardiac complaints today       HISTORY OF PRESENT ILLNESS:   Patient is a 77years old female with a history of CAD status post CABG in 2017, atrial fibrillation, cardiomyopathy, chronic systolic congestive heart failure and hyperlipidemia, is here for a follow up visit. Patient denies any chest pain, no shortness of breath, no lightheadedness, no dizziness, no palpitations, no pedal edema, no PND, no orthopnea, no syncope, no presyncopal episodes. Past Medical History:   Diagnosis Date    Acute systolic congestive heart failure ([address] 4/24/2017    Atrial fibrillation (HCC)     CAD (coronary artery disease)     History of echocardiogram 04/14/2017    EF 26%    Hyperlipidemia 4/24/2017    Ischemic cardiomyopathy     Non-rheumatic tricuspid valve insufficiency     Nonrheumatic mitral (valve) insufficiency          Past Surgical History:   Procedure Laterality Date    CARDIAC CATHETERIZATION  04/17/2017    Dr. Lele Patel GRAFT  04/17/2017    DIAGNOSTIC CARDIAC CATH LAB PROCEDURE  04/17/2017    Dr. Brianna Munguia         Current Outpatient Medications   Medication Sig Dispense Refill    furosemide (LASIX) 20 MG tablet Take 1 tablet by mouth daily 30 tablet 1    atorvastatin (LIPITOR) 40 MG tablet Take 1 tablet by mouth daily 30 tablet 3    warfarin (COUMADIN) 5 MG tablet Take 5 mg by mouth Pt  is taking 5 mg alternating with 2.5 mg every other day      lisinopril (PRINIVIL;ZESTRIL) 2.5 MG tablet Take 1 tablet by mouth daily 90 tablet 3    carvedilol (COREG) 12.5 MG tablet Take 1 tablet by mouth 2 times daily (with meals) 180 tablet 3     No current facility-administered medications for this visit.           Allergies as of 03/09/2020    (No Known Allergies)       Social History Socioeconomic History    Marital status:      Spouse name: Not on file    Number of children: Not on file    Years of education: Not on file    Highest education level: Not on file   Occupational History    Not on file   Social Needs    Financial resource strain: Not on file    Food insecurity     Worry: Not on file     Inability: Not on file    Transportation needs     Medical: Not on file     Non-medical: Not on file   Tobacco Use    Smoking status: Former Smoker     Last attempt to quit: 2002     Years since quittin.9    Smokeless tobacco: Never Used   Substance and Sexual Activity    Alcohol use: No     Comment: 1 pot coffee per day    Drug use: No    Sexual activity: Never   Lifestyle    Physical activity     Days per week: Not on file     Minutes per session: Not on file    Stress: Not on file   Relationships    Social connections     Talks on phone: Not on file     Gets together: Not on file     Attends [employer] service: Not on file     Active member of club or organization: Not on file     Attends meetings of clubs or organizations: Not on file     Relationship status: Not on file    Intimate partner violence     Fear of current or ex partner: Not on file     Emotionally abused: Not on file     Physically abused: Not on file     Forced sexual activity: Not on file   Other Topics Concern    Not on file   Social History Narrative    Not on file       History reviewed. No pertinent family history.     REVIEW OF SYSTEMS:     CONSTITUTIONAL:  negative for  fevers, chills, sweats and fatigue  HEENT:  negative for  tinnitus, earaches, nasal congestion and epistaxis  RESPIRATORY:  negative for  dry cough, cough with sputum, dyspnea, wheezing and hemoptysis  GASTROINTESTINAL:  negative for nausea, vomiting, diarrhea, constipation, pruritus and jaundice  HEMATOLOGIC/LYMPHATIC:  negative for easy bruising, bleeding, lymphadenopathy and petechiae  ENDOCRINE:  negative for heat above: IMPRESSION:  1. Atrial fibrillation: Persistent,  Rate is controlled, [id number] score of 5, on Coumadin. 2.   Systolic congestive heart failure: Compensated, continue current treatment  3. CAD: Status post CABG in April 2017 with a LIMA to LAD, SVG to left PLV, doing well. 4. Cardiomyopathy: Ischemic  5. Mild-to-moderate mitral valve regurgitation  6. ModerateTricuspid valve regurgitation  7. Hypertension: Controlled  8. Chronic anticoagulation    RECOMMENDATIONS:     1. Continue current treatment  2. Preventive cardiology: Low-salt, low-cholesterol diet, daily exercise, total cholesterol of less than 200, LDL of less than 70,were all advised. 3.  CHF: Daily weight, take an extra Lasix for weight gain of more than 2-3 pounds in 24 hours, compliance with diuretics, low-salt diet were all advised. 4.  Compliance with the Coumadin dose, PT and INR check appointments was strongly advised  5. Increase risk of bleeding due to being on anti-coagulation, symptoms and signs of bleeding discussed with patient, patient was advised to seek medical attention at the earliest symptoms or signs of bleeding. 6.  Follow-up with Dr. Monahan Needs as scheduled  7. Follow-up with Dr. Martin Wolff in 6 months, sooner if symptomatic for any reason    I have reviewed my findings and recommendations with patient    Electronically signed by Mattie Suarez MD on 3/29/2020 at 3:02 PM  NOTE: This report was transcribed using voice recognition software.  Every effort was made to ensure accuracy; however, inadvertent computerized transcription errors may be present

## 2020-03-09 ENCOUNTER — OFFICE VISIT (OUTPATIENT)
Dept: CARDIOLOGY CLINIC | Age: 75
End: 2020-03-09
Payer: MEDICARE

## 2020-03-09 VITALS
SYSTOLIC BLOOD PRESSURE: 138 MMHG | HEART RATE: 54 BPM | WEIGHT: 158 LBS | HEIGHT: 62 IN | BODY MASS INDEX: 29.08 KG/M2 | DIASTOLIC BLOOD PRESSURE: 78 MMHG

## 2020-03-09 PROCEDURE — 99214 OFFICE O/P EST MOD 30 MIN: CPT | Performed by: INTERNAL MEDICINE

## 2020-03-09 PROCEDURE — 93000 ELECTROCARDIOGRAM COMPLETE: CPT | Performed by: INTERNAL MEDICINE

## 2020-03-12 VITALS
BODY MASS INDEX: 30.36 KG/M2 | DIASTOLIC BLOOD PRESSURE: 74 MMHG | HEART RATE: 68 BPM | SYSTOLIC BLOOD PRESSURE: 118 MMHG | HEIGHT: 62 IN | WEIGHT: 165 LBS

## 2020-03-16 RX ORDER — LISINOPRIL 2.5 MG/1
2.5 TABLET ORAL DAILY
Qty: 90 TABLET | Refills: 3 | Status: SHIPPED
Start: 2020-03-16 | End: 2020-03-17 | Stop reason: SDUPTHER

## 2020-03-16 RX ORDER — CARVEDILOL 12.5 MG/1
12.5 TABLET ORAL 2 TIMES DAILY WITH MEALS
Qty: 180 TABLET | Refills: 3 | Status: SHIPPED
Start: 2020-03-16 | End: 2020-03-19 | Stop reason: SDUPTHER

## 2020-03-19 RX ORDER — CARVEDILOL 12.5 MG/1
12.5 TABLET ORAL 2 TIMES DAILY WITH MEALS
Qty: 180 TABLET | Refills: 3 | Status: SHIPPED
Start: 2020-03-19 | End: 2020-09-02 | Stop reason: SDUPTHER

## 2020-03-19 RX ORDER — LISINOPRIL 2.5 MG/1
2.5 TABLET ORAL DAILY
Qty: 90 TABLET | Refills: 3 | Status: SHIPPED
Start: 2020-03-19 | End: 2020-03-31 | Stop reason: SDUPTHER

## 2020-03-31 RX ORDER — LISINOPRIL 2.5 MG/1
2.5 TABLET ORAL DAILY
Qty: 90 TABLET | Refills: 3 | Status: ON HOLD | OUTPATIENT
Start: 2020-03-31 | End: 2020-06-27 | Stop reason: SDUPTHER

## 2020-06-26 ENCOUNTER — APPOINTMENT (OUTPATIENT)
Dept: GENERAL RADIOLOGY | Age: 75
End: 2020-06-26
Payer: MEDICARE

## 2020-06-26 ENCOUNTER — HOSPITAL ENCOUNTER (OUTPATIENT)
Age: 75
Setting detail: OBSERVATION
Discharge: HOME | End: 2020-06-27
Attending: EMERGENCY MEDICINE | Admitting: INTERNAL MEDICINE
Payer: MEDICARE

## 2020-06-26 ENCOUNTER — APPOINTMENT (OUTPATIENT)
Dept: ULTRASOUND IMAGING | Age: 75
End: 2020-06-26
Payer: MEDICARE

## 2020-06-26 PROBLEM — R07.9 CHEST PAIN: Status: ACTIVE | Noted: 2020-06-26

## 2020-06-26 PROBLEM — I50.21 ACUTE SYSTOLIC HEART FAILURE (HCC): Status: ACTIVE | Noted: 2020-06-26

## 2020-06-26 LAB
ALBUMIN SERPL-MCNC: 3.8 G/DL (ref 3.5–5.2)
ALP BLD-CCNC: 143 U/L (ref 35–104)
ALT SERPL-CCNC: 25 U/L (ref 0–32)
ANION GAP SERPL CALCULATED.3IONS-SCNC: 13 MMOL/L (ref 7–16)
ANISOCYTOSIS: ABNORMAL
AST SERPL-CCNC: 32 U/L (ref 0–31)
BASOPHILS ABSOLUTE: 0 E9/L (ref 0–0.2)
BASOPHILS RELATIVE PERCENT: 0 % (ref 0–2)
BILIRUB SERPL-MCNC: 2.3 MG/DL (ref 0–1.2)
BUN BLDV-MCNC: 17 MG/DL (ref 8–23)
CALCIUM SERPL-MCNC: 9 MG/DL (ref 8.6–10.2)
CHLORIDE BLD-SCNC: 105 MMOL/L (ref 98–107)
CO2: 19 MMOL/L (ref 22–29)
CREAT SERPL-MCNC: 0.9 MG/DL (ref 0.5–1)
EKG ATRIAL RATE: 163 BPM
EKG Q-T INTERVAL: 376 MS
EKG QRS DURATION: 120 MS
EKG QTC CALCULATION (BAZETT): 428 MS
EKG R AXIS: 60 DEGREES
EKG T AXIS: 154 DEGREES
EKG VENTRICULAR RATE: 78 BPM
EOSINOPHILS ABSOLUTE: 0 E9/L (ref 0.05–0.5)
EOSINOPHILS RELATIVE PERCENT: 0 % (ref 0–6)
GFR AFRICAN AMERICAN: >60
GFR NON-AFRICAN AMERICAN: >60 ML/MIN/1.73
GLUCOSE BLD-MCNC: 130 MG/DL (ref 74–99)
HCT VFR BLD CALC: 39.3 % (ref 34–48)
HEMOGLOBIN: 12.8 G/DL (ref 11.5–15.5)
INR BLD: 3.8
LYMPHOCYTES ABSOLUTE: 6.73 E9/L (ref 1.5–4)
LYMPHOCYTES RELATIVE PERCENT: 53 % (ref 20–42)
MCH RBC QN AUTO: 30.3 PG (ref 26–35)
MCHC RBC AUTO-ENTMCNC: 32.6 % (ref 32–34.5)
MCV RBC AUTO: 93.1 FL (ref 80–99.9)
MONOCYTES ABSOLUTE: 0.64 E9/L (ref 0.1–0.95)
MONOCYTES RELATIVE PERCENT: 5 % (ref 2–12)
NEUTROPHILS ABSOLUTE: 5.33 E9/L (ref 1.8–7.3)
NEUTROPHILS RELATIVE PERCENT: 42 % (ref 43–80)
OVALOCYTES: ABNORMAL
PDW BLD-RTO: 14.6 FL (ref 11.5–15)
PLATELET # BLD: 175 E9/L (ref 130–450)
PMV BLD AUTO: 9.2 FL (ref 7–12)
POIKILOCYTES: ABNORMAL
POLYCHROMASIA: ABNORMAL
POTASSIUM REFLEX MAGNESIUM: 4.3 MMOL/L (ref 3.5–5)
PRO-BNP: 6555 PG/ML (ref 0–450)
PROTHROMBIN TIME: 44.1 SEC (ref 9.3–12.4)
RBC # BLD: 4.22 E12/L (ref 3.5–5.5)
SODIUM BLD-SCNC: 137 MMOL/L (ref 132–146)
TOTAL PROTEIN: 7 G/DL (ref 6.4–8.3)
TROPONIN: <0.01 NG/ML (ref 0–0.03)
WBC # BLD: 12.7 E9/L (ref 4.5–11.5)

## 2020-06-26 PROCEDURE — 6370000000 HC RX 637 (ALT 250 FOR IP): Performed by: PHYSICIAN ASSISTANT

## 2020-06-26 PROCEDURE — 99214 OFFICE O/P EST MOD 30 MIN: CPT | Performed by: INTERNAL MEDICINE

## 2020-06-26 PROCEDURE — 76705 ECHO EXAM OF ABDOMEN: CPT

## 2020-06-26 PROCEDURE — 6360000002 HC RX W HCPCS: Performed by: EMERGENCY MEDICINE

## 2020-06-26 PROCEDURE — 80053 COMPREHEN METABOLIC PANEL: CPT

## 2020-06-26 PROCEDURE — 71045 X-RAY EXAM CHEST 1 VIEW: CPT

## 2020-06-26 PROCEDURE — 6360000002 HC RX W HCPCS: Performed by: PHYSICIAN ASSISTANT

## 2020-06-26 PROCEDURE — 93010 ELECTROCARDIOGRAM REPORT: CPT | Performed by: INTERNAL MEDICINE

## 2020-06-26 PROCEDURE — 2580000003 HC RX 258: Performed by: PHYSICIAN ASSISTANT

## 2020-06-26 PROCEDURE — 85025 COMPLETE CBC W/AUTO DIFF WBC: CPT

## 2020-06-26 PROCEDURE — 96372 THER/PROPH/DIAG INJ SC/IM: CPT

## 2020-06-26 PROCEDURE — 36415 COLL VENOUS BLD VENIPUNCTURE: CPT

## 2020-06-26 PROCEDURE — G0378 HOSPITAL OBSERVATION PER HR: HCPCS

## 2020-06-26 PROCEDURE — 84484 ASSAY OF TROPONIN QUANT: CPT

## 2020-06-26 PROCEDURE — 93005 ELECTROCARDIOGRAM TRACING: CPT | Performed by: EMERGENCY MEDICINE

## 2020-06-26 PROCEDURE — APPSS180 APP SPLIT SHARED TIME > 60 MINUTES: Performed by: NURSE PRACTITIONER

## 2020-06-26 PROCEDURE — 85610 PROTHROMBIN TIME: CPT

## 2020-06-26 PROCEDURE — 99285 EMERGENCY DEPT VISIT HI MDM: CPT

## 2020-06-26 PROCEDURE — 96374 THER/PROPH/DIAG INJ IV PUSH: CPT

## 2020-06-26 PROCEDURE — 83880 ASSAY OF NATRIURETIC PEPTIDE: CPT

## 2020-06-26 PROCEDURE — 6370000000 HC RX 637 (ALT 250 FOR IP): Performed by: EMERGENCY MEDICINE

## 2020-06-26 PROCEDURE — 2140000000 HC CCU INTERMEDIATE R&B

## 2020-06-26 RX ORDER — ACETAMINOPHEN 325 MG/1
650 TABLET ORAL EVERY 6 HOURS PRN
Status: DISCONTINUED | OUTPATIENT
Start: 2020-06-26 | End: 2020-06-27 | Stop reason: HOSPADM

## 2020-06-26 RX ORDER — ASPIRIN 81 MG/1
81 TABLET, CHEWABLE ORAL DAILY
Status: DISCONTINUED | OUTPATIENT
Start: 2020-06-27 | End: 2020-06-27 | Stop reason: HOSPADM

## 2020-06-26 RX ORDER — PROMETHAZINE HYDROCHLORIDE 25 MG/1
12.5 TABLET ORAL EVERY 6 HOURS PRN
Status: DISCONTINUED | OUTPATIENT
Start: 2020-06-26 | End: 2020-06-27 | Stop reason: HOSPADM

## 2020-06-26 RX ORDER — WARFARIN SODIUM 5 MG/1
5 TABLET ORAL DAILY
Status: DISCONTINUED | OUTPATIENT
Start: 2020-06-26 | End: 2020-06-27 | Stop reason: HOSPADM

## 2020-06-26 RX ORDER — FUROSEMIDE 20 MG/1
20 TABLET ORAL DAILY
Status: DISCONTINUED | OUTPATIENT
Start: 2020-06-26 | End: 2020-06-27 | Stop reason: HOSPADM

## 2020-06-26 RX ORDER — ASPIRIN 325 MG
325 TABLET ORAL ONCE
Status: COMPLETED | OUTPATIENT
Start: 2020-06-26 | End: 2020-06-26

## 2020-06-26 RX ORDER — DOCUSATE SODIUM 100 MG/1
100 CAPSULE, LIQUID FILLED ORAL 2 TIMES DAILY
COMMUNITY

## 2020-06-26 RX ORDER — ONDANSETRON 2 MG/ML
4 INJECTION INTRAMUSCULAR; INTRAVENOUS EVERY 6 HOURS PRN
Status: DISCONTINUED | OUTPATIENT
Start: 2020-06-26 | End: 2020-06-27 | Stop reason: HOSPADM

## 2020-06-26 RX ORDER — SODIUM CHLORIDE 0.9 % (FLUSH) 0.9 %
10 SYRINGE (ML) INJECTION EVERY 12 HOURS SCHEDULED
Status: DISCONTINUED | OUTPATIENT
Start: 2020-06-26 | End: 2020-06-27 | Stop reason: HOSPADM

## 2020-06-26 RX ORDER — ACETAMINOPHEN 650 MG/1
650 SUPPOSITORY RECTAL EVERY 6 HOURS PRN
Status: DISCONTINUED | OUTPATIENT
Start: 2020-06-26 | End: 2020-06-27 | Stop reason: HOSPADM

## 2020-06-26 RX ORDER — SODIUM CHLORIDE 0.9 % (FLUSH) 0.9 %
10 SYRINGE (ML) INJECTION PRN
Status: DISCONTINUED | OUTPATIENT
Start: 2020-06-26 | End: 2020-06-27 | Stop reason: HOSPADM

## 2020-06-26 RX ORDER — ATORVASTATIN CALCIUM 40 MG/1
40 TABLET, FILM COATED ORAL DAILY
Status: DISCONTINUED | OUTPATIENT
Start: 2020-06-26 | End: 2020-06-27 | Stop reason: HOSPADM

## 2020-06-26 RX ORDER — FUROSEMIDE 10 MG/ML
20 INJECTION INTRAMUSCULAR; INTRAVENOUS ONCE
Status: COMPLETED | OUTPATIENT
Start: 2020-06-26 | End: 2020-06-26

## 2020-06-26 RX ORDER — CARVEDILOL 6.25 MG/1
12.5 TABLET ORAL 2 TIMES DAILY WITH MEALS
Status: DISCONTINUED | OUTPATIENT
Start: 2020-06-26 | End: 2020-06-27 | Stop reason: HOSPADM

## 2020-06-26 RX ORDER — POLYETHYLENE GLYCOL 3350 17 G/17G
17 POWDER, FOR SOLUTION ORAL DAILY PRN
Status: DISCONTINUED | OUTPATIENT
Start: 2020-06-26 | End: 2020-06-27 | Stop reason: HOSPADM

## 2020-06-26 RX ORDER — POTASSIUM CHLORIDE 7.45 MG/ML
10 INJECTION INTRAVENOUS PRN
Status: DISCONTINUED | OUTPATIENT
Start: 2020-06-26 | End: 2020-06-27 | Stop reason: HOSPADM

## 2020-06-26 RX ORDER — POTASSIUM CHLORIDE 20 MEQ/1
40 TABLET, EXTENDED RELEASE ORAL PRN
Status: DISCONTINUED | OUTPATIENT
Start: 2020-06-26 | End: 2020-06-27 | Stop reason: HOSPADM

## 2020-06-26 RX ORDER — NITROGLYCERIN 0.4 MG/1
0.4 TABLET SUBLINGUAL EVERY 5 MIN PRN
Status: DISCONTINUED | OUTPATIENT
Start: 2020-06-26 | End: 2020-06-27 | Stop reason: HOSPADM

## 2020-06-26 RX ORDER — LISINOPRIL 5 MG/1
2.5 TABLET ORAL DAILY
Status: DISCONTINUED | OUTPATIENT
Start: 2020-06-26 | End: 2020-06-27

## 2020-06-26 RX ADMIN — CARVEDILOL 12.5 MG: 6.25 TABLET, FILM COATED ORAL at 18:29

## 2020-06-26 RX ADMIN — FUROSEMIDE 20 MG: 20 TABLET ORAL at 18:29

## 2020-06-26 RX ADMIN — SODIUM CHLORIDE, PRESERVATIVE FREE 10 ML: 5 INJECTION INTRAVENOUS at 20:08

## 2020-06-26 RX ADMIN — LISINOPRIL 2.5 MG: 5 TABLET ORAL at 18:30

## 2020-06-26 RX ADMIN — ENOXAPARIN SODIUM 40 MG: 40 INJECTION SUBCUTANEOUS at 18:30

## 2020-06-26 RX ADMIN — FUROSEMIDE 20 MG: 10 INJECTION, SOLUTION INTRAVENOUS at 08:52

## 2020-06-26 RX ADMIN — ASPIRIN 325 MG ORAL TABLET 325 MG: 325 PILL ORAL at 07:40

## 2020-06-26 RX ADMIN — ATORVASTATIN CALCIUM 40 MG: 40 TABLET, FILM COATED ORAL at 20:08

## 2020-06-26 ASSESSMENT — PAIN DESCRIPTION - DESCRIPTORS: DESCRIPTORS: SPASM

## 2020-06-26 ASSESSMENT — PAIN SCALES - GENERAL
PAINLEVEL_OUTOF10: 0
PAINLEVEL_OUTOF10: 5
PAINLEVEL_OUTOF10: 0
PAINLEVEL_OUTOF10: 0

## 2020-06-26 ASSESSMENT — PAIN DESCRIPTION - PAIN TYPE: TYPE: ACUTE PAIN

## 2020-06-26 ASSESSMENT — PAIN DESCRIPTION - FREQUENCY: FREQUENCY: CONTINUOUS

## 2020-06-26 ASSESSMENT — PAIN DESCRIPTION - LOCATION: LOCATION: CHEST

## 2020-06-26 NOTE — ED PROVIDER NOTES
Department of Emergency Medicine   ED  Provider Note  Admit Date/RoomTime: 6/26/2020  7:00 AM  ED Room: 09/09          History of Present Illness:  6/26/20, Time: 7:13 AM EDT  Chief Complaint   Patient presents with    Chest Pain     midsternal spasm like pain over last 4 days    Shortness of Breath                Fernando Steiner is a 77 y.o. female presenting to the ED for chest pain, beginning 4 days ago. The complaint has been intermittent, moderate in severity, and worsened by nothing. Patient reports intermittent chest pain that began 4 days ago. She reports a thumping in her chest with heaviness and tightness. Nonradiating, not associated with any other symptoms except mild shortness of breath. She denies fever chills. She denies nausea or vomiting. No diaphoresis. No pleuritic pain. No sharp or stabbing pain. No back pain. No orthopnea. No leg swelling. She has a history of A. fib and takes warfarin. Review of Systems:   Pertinent positives and negatives are stated within HPI, all other systems reviewed and are negative.        --------------------------------------------- PAST HISTORY ---------------------------------------------  Past Medical History:  has a past medical history of Acute systolic congestive heart failure ([facility] [address] Atrial fibrillation ([facility] [address] CAD (coronary artery disease), History of echocardiogram, Hyperlipidemia, Ischemic cardiomyopathy, Non-rheumatic tricuspid valve insufficiency, and Nonrheumatic mitral (valve) insufficiency. Past Surgical History:  has a past surgical history that includes Cardiac catheterization (04/17/2017); Coronary artery bypass graft (04/17/2017); and Diagnostic Cardiac Cath Lab Procedure (04/17/2017). Social History:  reports that she quit smoking about 18 years ago. She has never used smokeless tobacco. She reports that she does not drink alcohol or use drugs. Family History: family history is not on file. . Unless otherwise noted, family (L) 43.0 - 80.0 %    Lymphocytes % 53.0 (H) 20.0 - 42.0 %    Monocytes % 5.0 2.0 - 12.0 %    Eosinophils % 0.0 0.0 - 6.0 %    Basophils % 0.0 0.0 - 2.0 %    Neutrophils Absolute 5.33 1.80 - 7.30 E9/L    Lymphocytes Absolute 6.73 (H) 1.50 - 4.00 E9/L    Monocytes Absolute 0.64 0.10 - 0.95 E9/L    Eosinophils Absolute 0.00 (L) 0.05 - 0.50 E9/L    Basophils Absolute 0.00 0.00 - 0.20 E9/L    Anisocytosis 1+     Polychromasia 1+     Poikilocytes 1+     Ovalocytes 1+    Comprehensive Metabolic Panel w/ Reflex to MG   Result Value Ref Range    Sodium 137 132 - 146 mmol/L    Potassium reflex Magnesium 4.3 3.5 - 5.0 mmol/L    Chloride 105 98 - 107 mmol/L    CO2 19 (L) 22 - 29 mmol/L    Anion Gap 13 7 - 16 mmol/L    Glucose 130 (H) 74 - 99 mg/dL    BUN 17 8 - 23 mg/dL    CREATININE 0.9 0.5 - 1.0 mg/dL    GFR Non-African American >60 >=60 mL/min/1.73    GFR African American >60     Calcium 9.0 8.6 - 10.2 mg/dL    Total Protein 7.0 6.4 - 8.3 g/dL    Alb 3.8 3.5 - 5.2 g/dL    Total Bilirubin 2.3 (H) 0.0 - 1.2 mg/dL    Alkaline Phosphatase 143 (H) 35 - 104 U/L    ALT 25 0 - 32 U/L    AST 32 (H) 0 - 31 U/L   Troponin   Result Value Ref Range    Troponin <0.01 0.00 - 0.03 ng/mL   Brain Natriuretic Peptide   Result Value Ref Range    Pro-BNP 6,555 (H) 0 - 450 pg/mL   Protime-INR   Result Value Ref Range    Protime 44.1 (H) 9.3 - 12.4 sec    INR 3.8    Troponin   Result Value Ref Range    Troponin <0.01 0.00 - 0.03 ng/mL   EKG 12 Lead   Result Value Ref Range    Ventricular Rate 78 BPM    Atrial Rate 163 BPM    QRS Duration 120 ms    Q-T Interval 376 ms    QTc Calculation (Bazett) 428 ms    R Axis 60 degrees    T Axis 154 degrees   ,       RADIOLOGY:  Interpreted by Radiologist unless otherwise specified  US GALLBLADDER RUQ   Final Result   Questionable 7 mm gallbladder polyp versus nonshadowing   calculus. No evidence of acute cholecystitis. Sonographic follow-up is   recommended in 12 months.       XR CHEST PORTABLE   Final Result Status post median sternotomy with cardiac revascularization procedure      No evidence of airspace consolidation or pulmonary venous congestion      Small left-sided pleural effusion. The study is unchanged from prior study               EKG Interpretation  Interpreted by emergency department physician, Dr. Layton Grayson     Date of EK20  Time: 705    Rhythm: atrial fibrillation - controlled  Rate: normal  Axis: normal  Conduction: normal  ST Segments: no acute change  T Waves: no acute change    Clinical Impression: afib, chronic, no acute ischemic changes  Comparison to prior EKG: stable as compared to patient's most recent EKG      ------------------------- NURSING NOTES AND VITALS REVIEWED ---------------------------   The nursing notes within the ED encounter and vital signs as below have been reviewed by myself  BP (!) 158/90   Pulse 77   Temp 97.6 °F (36.4 °C) (Oral)   Resp 16   Wt 160 lb (72.6 kg)   SpO2 97%   BMI 29.26 kg/m²     Oxygen Saturation Interpretation: Normal    The patients available past medical records and past encounters were reviewed. ------------------------------ ED COURSE/MEDICAL DECISION MAKING----------------------  Medications   perflutren lipid microspheres (DEFINITY) injection 1.65 mg (has no administration in time range)   aspirin tablet 325 mg (325 mg Oral Given 20 0740)   furosemide (LASIX) injection 20 mg (20 mg Intravenous Given 20 0852)           The cardiac monitor revealed A. fib with a heart rate in the 70 s as interpreted by me. The cardiac monitor was ordered secondary to the patient's chest pain and to monitor the patient for dysrhythmia. CPT [id number]       I, Dr. Layton Grayson, am the primary provider of record    Medical Decision Making:   Appears to be in decompensated heart failure with pulmonary edema as well as elevated BNP. She has orthopnea as well. Also chest pain.   Cardiology was consulted, they agree with admission to the hospital.

## 2020-06-26 NOTE — CONSULTS
Nonrheumatic mitral (valve) insufficiency      PMHx  CAD: Status post CABG in April 2017 with a LIMA to LAD, SVG to left PLV  Cardiomyopathy: Ischemic, She had a LifeVest in 2017, then outpatient echocardiogram revealed improvement of her EF, now EF is 35%  Chronic HFrEF  Persistent Atrial fibrillation  Mild-to-moderate mitral valve regurgitation  ModerateTricuspid valve regurgitation  Hypertension  Hyperlipidemia      Past Surgical History:    Past Surgical History:   Procedure Laterality Date    CARDIAC CATHETERIZATION  04/17/2017    Dr. Lunette Scheuermann  04/17/2017    DIAGNOSTIC CARDIAC CATH LAB PROCEDURE  04/17/2017    Dr. Chu Ask       Medications Prior to admit:  Prior to Admission medications    Medication Sig Start Date End Date Taking? Authorizing Provider   lisinopril (PRINIVIL;ZESTRIL) 2.5 MG tablet Take 1 tablet by mouth daily 3/31/20   Edu Levine MD   carvedilol (COREG) 12.5 MG tablet Take 1 tablet by mouth 2 times daily (with meals) 3/19/20   Edu Levine MD   furosemide (LASIX) 20 MG tablet Take 1 tablet by mouth daily 2/15/20   Edu Levine MD   atorvastatin (LIPITOR) 40 MG tablet Take 1 tablet by mouth daily 2/15/20   Crystal Pierre MD   warfarin (COUMADIN) 5 MG tablet Take 5 mg by mouth Pt  is taking 5 mg alternating with 2.5 mg every other day    Historical Provider, MD       Current Medications:    No current facility-administered medications for this encounter. Allergies:  Patient has no known allergies. Social History:    Social History     Socioeconomic History    Marital status:       Spouse name: Not on file    Number of children: Not on file    Years of education: Not on file    Highest education level: Not on file   Occupational History    Not on file   Social Needs    Financial resource strain: Not on file    Food insecurity     Worry: Not on file     Inability: Not on file    Transportation needs     Medical: Not on file CARDIAC ENZYMES:  Recent Labs     06/26/20  0728   TROPONINI <0.01     FASTING LIPID PANEL:  Lab Results   Component Value Date    CHOL 196 04/15/2017    HDL 48 04/15/2017    LDLCALC 129 04/15/2017    TRIG 97 04/15/2017     LIVER PROFILE:  Recent Labs     06/26/20  0728   AST 32*   ALT 25   LABALBU 3.8     IMAGING:    CXR (6/26/2020)  Impression:   Status post median sternotomy with cardiac revascularization procedure  No evidence of airspace consolidation or pulmonary venous congestion  Small left-sided pleural effusion. The study is unchanged from prior study      CARDIAC TESTING:    TTE (4/15/2017)   Summary:   Left ventricular size is grossly normal.   Mild left ventricular concentric hypertrophy noted. Ejection fraction is measured at 26%. Inferior wall akinetic. No evidence of left ventricular mass or thrombus noted. The left atrium is mildly dilated. Interatrial septum appears intact. No evidence of thrombus within left atrium. Mild to moderate mitral regurgitation is present. No mitral valve stenosis present. Physiologic and/or trace tricuspid regurgitation. RVSP is 30.2 mmHg. Regular rhythm. Angiography: 4/17/17:  Left main: 70% distal stenosis  LAD: 60% mid stenosis  Circumflex: Luminal irregularities  RCA: 80-90% proximal hazy lesion     CABG 4/20/17 x2, left internal mammary artery to the  LAD, saphenous vein graft to the circ posterolateral.    CHAN (5/2017)  Summary:   No previous echo for comparison. Technically adequate study. Moderate to severe mitral regurgitation is present. Mild tricuspid regurgitation. TTE (9/2019)   Summary:   Compared to prior echo, changes noted. Technically adequate study. Normal left ventricular wall thickness. Ejection fraction is visually estimated at 35-40%. Global wall hypokinesis   Indeterminate diastolic function. No evidence of left ventricular mass or thrombus noted. Mild to moderate mitral regurgitation is present. information, assessment and treatment plan. I have reviewed and edited the note above based on my findings during my history, exam, and decision making. Please see my additional contributions to the history, physical exam, assessment, and recommendations below. HISTORY OF PRESENT ILLNESS:   Patient 77years old female with a history of CAD status post CABG in April 2017, presented to the hospital with a 4-day history of intermittent chest pain last for a minute to 2 minutes, comes and goes, at rest and with exertion, no radiation, no significant alleviating or aggravating factors, no associated symptoms, however patient has been complaining of worsening shortness of breath and pedal edema which is independent of chest pain, occasional palpitations, no lightheadedness or dizziness, no syncope, no presyncopal episodes. Past medical history:  Reviewed, as above. Past surgical history:  Reviewed, as above. Current medications:  Reviewed, as above    Allergies:  Reviewed, as above    Social history:  Reviewed, as above    Family medical history:  Reviewed, as above. REVIEW OF SYSTEMS:   Reviewed, as above. PHYSICAL EXAM:   CONSTITUTIONAL:  awake, alert, cooperative, no apparent distress, and appears stated age  EYES:  lids and lashes normal and pupils equal, round and reactive to light, anicteric sclerae  HEAD:  normocepalic, without obvious abnormality, atraumatic, pink, moist mucous membranes.   NECK:  Supple, symmetrical, trachea midline, no adenopathy, thyroid symmetric, not enlarged and no tenderness, skin normal  HEMATOLOGIC/LYMPHATICS:  no cervical lymphadenopathy and no supraclavicular lymphadenopathy  LUNGS:  No increased work of breathing, good air exchange, clear to auscultation bilaterally, no crackles or wheezing  CARDIOVASCULAR:  Normal apical impulse, irregularly irregular, normal S1 and S2, no S3 or S4, 3/6 systolic murmur at the apex, 3/6 systolic murmur at the left lower sternal

## 2020-06-27 VITALS
HEIGHT: 62 IN | SYSTOLIC BLOOD PRESSURE: 141 MMHG | OXYGEN SATURATION: 96 % | HEART RATE: 73 BPM | BODY MASS INDEX: 30.03 KG/M2 | RESPIRATION RATE: 16 BRPM | WEIGHT: 163.2 LBS | TEMPERATURE: 98.4 F | DIASTOLIC BLOOD PRESSURE: 71 MMHG

## 2020-06-27 PROBLEM — E80.6 HYPERBILIRUBINEMIA: Status: ACTIVE | Noted: 2020-06-27

## 2020-06-27 PROBLEM — I50.22 CHRONIC SYSTOLIC HEART FAILURE (HCC): Status: ACTIVE | Noted: 2020-06-26

## 2020-06-27 LAB
ANION GAP SERPL CALCULATED.3IONS-SCNC: 14 MMOL/L (ref 7–16)
BUN BLDV-MCNC: 16 MG/DL (ref 8–23)
CALCIUM SERPL-MCNC: 8.8 MG/DL (ref 8.6–10.2)
CHLORIDE BLD-SCNC: 102 MMOL/L (ref 98–107)
CHOLESTEROL, TOTAL: 108 MG/DL (ref 0–199)
CO2: 25 MMOL/L (ref 22–29)
CREAT SERPL-MCNC: 1 MG/DL (ref 0.5–1)
GFR AFRICAN AMERICAN: >60
GFR NON-AFRICAN AMERICAN: 54 ML/MIN/1.73
GLUCOSE BLD-MCNC: 132 MG/DL (ref 74–99)
HCT VFR BLD CALC: 41.1 % (ref 34–48)
HDLC SERPL-MCNC: 33 MG/DL
HEMOGLOBIN: 13.1 G/DL (ref 11.5–15.5)
INR BLD: 3.5
LDL CHOLESTEROL CALCULATED: 61 MG/DL (ref 0–99)
MCH RBC QN AUTO: 30.4 PG (ref 26–35)
MCHC RBC AUTO-ENTMCNC: 31.9 % (ref 32–34.5)
MCV RBC AUTO: 95.4 FL (ref 80–99.9)
PDW BLD-RTO: 14.4 FL (ref 11.5–15)
PLATELET # BLD: 173 E9/L (ref 130–450)
PMV BLD AUTO: 9.4 FL (ref 7–12)
POTASSIUM REFLEX MAGNESIUM: 4.3 MMOL/L (ref 3.5–5)
PROTHROMBIN TIME: 39.8 SEC (ref 9.3–12.4)
RBC # BLD: 4.31 E12/L (ref 3.5–5.5)
SODIUM BLD-SCNC: 141 MMOL/L (ref 132–146)
TRIGL SERPL-MCNC: 71 MG/DL (ref 0–149)
VLDLC SERPL CALC-MCNC: 14 MG/DL
WBC # BLD: 11 E9/L (ref 4.5–11.5)

## 2020-06-27 PROCEDURE — 2580000003 HC RX 258: Performed by: PHYSICIAN ASSISTANT

## 2020-06-27 PROCEDURE — G0378 HOSPITAL OBSERVATION PER HR: HCPCS

## 2020-06-27 PROCEDURE — 6370000000 HC RX 637 (ALT 250 FOR IP): Performed by: PHYSICIAN ASSISTANT

## 2020-06-27 PROCEDURE — 36415 COLL VENOUS BLD VENIPUNCTURE: CPT

## 2020-06-27 PROCEDURE — 85027 COMPLETE CBC AUTOMATED: CPT

## 2020-06-27 PROCEDURE — 80048 BASIC METABOLIC PNL TOTAL CA: CPT

## 2020-06-27 PROCEDURE — 85610 PROTHROMBIN TIME: CPT

## 2020-06-27 PROCEDURE — 80061 LIPID PANEL: CPT

## 2020-06-27 PROCEDURE — 99224 PR SBSQ OBSERVATION CARE/DAY 15 MINUTES: CPT | Performed by: INTERNAL MEDICINE

## 2020-06-27 RX ORDER — WARFARIN SODIUM 5 MG/1
5 TABLET ORAL DAILY
Qty: 30 TABLET | Refills: 3 | Status: SHIPPED | OUTPATIENT
Start: 2020-06-27 | End: 2020-09-17

## 2020-06-27 RX ORDER — LISINOPRIL 5 MG/1
5 TABLET ORAL DAILY
Status: DISCONTINUED | OUTPATIENT
Start: 2020-06-28 | End: 2020-06-27 | Stop reason: HOSPADM

## 2020-06-27 RX ORDER — LISINOPRIL 2.5 MG/1
5 TABLET ORAL DAILY
Qty: 90 TABLET | Refills: 3 | Status: SHIPPED | OUTPATIENT
Start: 2020-06-27 | End: 2020-10-22

## 2020-06-27 RX ADMIN — CARVEDILOL 12.5 MG: 6.25 TABLET, FILM COATED ORAL at 09:24

## 2020-06-27 RX ADMIN — FUROSEMIDE 20 MG: 20 TABLET ORAL at 09:23

## 2020-06-27 RX ADMIN — SODIUM CHLORIDE, PRESERVATIVE FREE 10 ML: 5 INJECTION INTRAVENOUS at 09:24

## 2020-06-27 RX ADMIN — LISINOPRIL 2.5 MG: 5 TABLET ORAL at 09:24

## 2020-06-27 RX ADMIN — ASPIRIN 81 MG 81 MG: 81 TABLET ORAL at 09:23

## 2020-06-27 ASSESSMENT — PAIN SCALES - GENERAL: PAINLEVEL_OUTOF10: 0

## 2020-06-27 NOTE — PROGRESS NOTES
Patient is seen in follow-up for chest pain    Subjective:     Ms. Enrique Bernard feels okay today, denies any chest pain, shortness of breath and pedal edema are significantly better  Laying in bed no apparent distress    ROS:  CONSTITUTIONAL:  negative for  fevers, chills  HEENT:  negative for earaches, nasal congestion and epistaxis  RESPIRATORY:  negative for  dry cough, cough with sputum,wheezing and hemoptysis  GASTROINTESTINAL:  negative for nausea, vomiting  MUSCULOSKELETAL:  negative for  myalgias, arthralgias  NEUROLOGICAL:  negative for visual disturbance, dysphagia    Medication side effects:none    Scheduled Meds:   [START ON 6/28/2020] lisinopril  5 mg Oral Daily    atorvastatin  40 mg Oral Daily    carvedilol  12.5 mg Oral BID WC    furosemide  20 mg Oral Daily    warfarin  5 mg Oral Daily    sodium chloride flush  10 mL Intravenous 2 times per day    aspirin  81 mg Oral Daily    enoxaparin  40 mg Subcutaneous Daily     Continuous Infusions:  PRN Meds:perflutren lipid microspheres, sodium chloride flush, acetaminophen **OR** acetaminophen, polyethylene glycol, promethazine **OR** ondansetron, nitroGLYCERIN, potassium chloride **OR** potassium alternative oral replacement **OR** potassium chloride      Objective:      Physical Exam:   BP (!) 141/71   Pulse 73   Temp 98.4 °F (36.9 °C) (Temporal)   Resp 16   Ht 5' 2\" (1.575 m)   Wt 163 lb 3.2 oz (74 kg)   SpO2 96%   BMI 29.85 kg/m²   CONSTITUTIONAL:  awake, alert, cooperative, no apparent distress, and appears stated age  HEAD:  normocepalic, without obvious abnormality, atraumatic  NECK:  Supple, symmetrical, trachea midline, no adenopathy, thyroid symmetric, not enlarged and no tenderness, skin normal  LUNGS:  No increased work of breathing, good air exchange, clear to auscultation bilaterally, no crackles or wheezing  CARDIOVASCULAR:  Normal apical impulse, irregularly irregular, normal S1 and S2, no S3 or S4, 3/6 systolic murmur at the apex,

## 2020-06-27 NOTE — H&P
carvedilol (COREG) 12.5 MG tablet, Take 1 tablet by mouth 2 times daily (with meals)  furosemide (LASIX) 20 MG tablet, Take 1 tablet by mouth daily  atorvastatin (LIPITOR) 40 MG tablet, Take 1 tablet by mouth daily    Note that the patient's home medications were reviewed and the above list is accurate to the best of my knowledge at the time of the exam.    Allergies:    Patient has no known allergies. Social History:    reports that she quit smoking about 18 years ago. She has never used smokeless tobacco. She reports that she does not drink alcohol or use drugs. Family History: Mother had CAD    REVIEW OF SYSTEMS:  As above in the HPI, otherwise negative    PHYSICAL EXAM:    Vitals:  /85   Pulse 83   Temp 97.9 °F (36.6 °C) (Oral)   Resp 16   Ht 5' 2\" (1.575 m)   Wt 163 lb 3.2 oz (74 kg)   SpO2 96%   BMI 29.85 kg/m²     General appearance: NAD, conversant  HEENT: AT/NC, MMM  Neck: FROM, supple  Lungs: Clear to auscultation  CV: RRR, no MRGs  Abdomen: Soft, non-tender; no masses or HSM, +BS  Extremities: No peripheral edema or digital cyanosis  Skin: no rash, lesions or ulcers  Psych: Calm and cooperative  Neuro: Alert and interactive, nonfocal    LABS:  All labs reviewed.   Of note:  CBC:   Lab Results   Component Value Date    WBC 12.7 06/26/2020    RBC 4.22 06/26/2020    HGB 12.8 06/26/2020    HCT 39.3 06/26/2020    MCV 93.1 06/26/2020    MCH 30.3 06/26/2020    MCHC 32.6 06/26/2020    RDW 14.6 06/26/2020     06/26/2020    MPV 9.2 06/26/2020     CMP:    Lab Results   Component Value Date     06/26/2020    K 4.3 06/26/2020     06/26/2020    CO2 19 06/26/2020    BUN 17 06/26/2020    CREATININE 0.9 06/26/2020    GFRAA >60 06/26/2020    LABGLOM >60 06/26/2020    GLUCOSE 130 06/26/2020    PROT 7.0 06/26/2020    LABALBU 3.8 06/26/2020    CALCIUM 9.0 06/26/2020    BILITOT 2.3 06/26/2020    ALKPHOS 143 06/26/2020    AST 32 06/26/2020    ALT 25 06/26/2020       Imaging:  I've

## 2020-06-27 NOTE — DISCHARGE SUMMARY
kg)    Height: 5' 2\" (1.575 m)           Gen: Pleasant female in NAD  CV: RRR no m/r/g  Lungs: CTAB  Chest wall: No tenderness  Abd: +BS soft NT ND no R/G no HSM    Condition:  Stable    Disposition: home    Patient Instructions:   Current Discharge Medication List      CONTINUE these medications which have CHANGED    Details   lisinopril (PRINIVIL;ZESTRIL) 2.5 MG tablet Take 2 tablets by mouth daily  Qty: 90 tablet, Refills: 3      warfarin (COUMADIN) 5 MG tablet Take 1 tablet by mouth daily Hold Coumadin for today and tomorrow, PT and INR check at Dr. Chapo Faustin office on Monday  Qty: 30 tablet, Refills: 3         CONTINUE these medications which have NOT CHANGED    Details   docusate sodium (COLACE) 100 MG capsule Take 100 mg by mouth 2 times daily      carvedilol (COREG) 12.5 MG tablet Take 1 tablet by mouth 2 times daily (with meals)  Qty: 180 tablet, Refills: 3      furosemide (LASIX) 20 MG tablet Take 1 tablet by mouth daily  Qty: 30 tablet, Refills: 1      atorvastatin (LIPITOR) 40 MG tablet Take 1 tablet by mouth daily  Qty: 30 tablet, Refills: 3           Activity: activity as tolerated  Diet: regular diet    Follow-up with PCP in 1 week.     Note that over 30 minutes was spent in preparing discharge papers, discussing discharge with patient, medication review, etc.    Signed:  Anibal Monday 6/27/2020  1:07 PM

## 2020-06-27 NOTE — PROGRESS NOTES
Messaged Dr. Roseanna Eduardo to see if patient needed Echo. He said patient can have Echo as put patient.

## 2020-06-29 ENCOUNTER — CARE COORDINATION (OUTPATIENT)
Dept: CASE MANAGEMENT | Age: 75
End: 2020-06-29

## 2020-06-29 NOTE — CARE COORDINATION
[facility] Transitions Initial Follow Up Call    Call within 2 business days of discharge: Yes    Patient: Jena Noriega Patient : 1945   MRN: [id number]  Reason for Admission: Chest pain  Discharge Date: 20 RARS: Readmission Risk Score: 11      Last Discharge [facility]       Complaint Diagnosis Description Type Department Provider    20 Chest Pain; Shortness of Breath Acute systolic heart failure ([address] . .. ED to Hosp-Admission (Discharged) (ADMITTED) Doug Zimmerman MD; Nat Chong, ... Spoke with: No one    Facility: [facility]    First attempt to reach the patient for Covid-19 Monitoring at risk Care Transition call post hospital discharge. Message left with CTN's contact information requesting return phone call.       Follow Up  Future Appointments   Date Time Provider Anjali Toro   2020  9:00 AM Kwasi Hill MD [facility]       Macros Sharp RN

## 2020-06-30 ENCOUNTER — TELEPHONE (OUTPATIENT)
Dept: ADMINISTRATIVE | Age: 75
End: 2020-06-30

## 2020-06-30 ENCOUNTER — CARE COORDINATION (OUTPATIENT)
Dept: CASE MANAGEMENT | Age: 75
End: 2020-06-30

## 2020-06-30 NOTE — CARE COORDINATION
lengths) with people who are sick.  Put distance between yourself and other people if COVID-19 is spreading in your community.  Clean and disinfect frequently touched surfaces.  Avoid all cruise travel and non-essential air travel.  Call your healthcare professional if you have concerns about COVID-19 and your underlying condition or if you are sick. For more information on steps you can take to protect yourself, see CDC's How to Protect Yourself    Patient denies any chest pain or shortness of breath. Patient reports Dr. Torsten Sanderson instructed her to resume previous dosage of Coumadin. Reviewed Lisinopril dosage. Patient to make follow up appt with Dr. Charlotte Salas. Plan for follow-up call in 7-14 days based on severity of symptoms and risk factors.       Follow Up  Future Appointments   Date Time Provider Anjali Toro   9/11/2020  9:00 AM Familia Thompson MD [facility]       Lucero Willson RN

## 2020-07-06 ENCOUNTER — CARE COORDINATION (OUTPATIENT)
Dept: CASE MANAGEMENT | Age: 75
End: 2020-07-06

## 2020-07-06 NOTE — CARE COORDINATION
[facility] Transitions Follow Up Call    2020    Patient: Lizy Wiggins  Patient : 1945   MRN: [id number]  Reason for Admission: Chest pain  Discharge Date: 20 RARS: Readmission Risk Score: 11         Spoke with: Lizy Wiggins, patient    Covid-19 Monitoring at risk episode to auto resolve on 20    Patient/family has been provided the following resources and education related to COVID-19:                         Signs, symptoms and red flags related to COVID-19            CDC exposure and quarantine guidelines            [employer] exposure contact - [phone]            Contact for their local Department of Health               Patient denies s/s of Covid-19. Patient denies chest pain and shortness of breath. Patient is staying inside where it is cool. Patient states she doesn't go out much, but does wear a mask when she does. No further outreach scheduled with this CTN/ACM. Episode of Care to auto resolve. Patient has this CTN/ACM contact information if future needs arise.       Follow Up  Future Appointments   Date Time Provider Anjali Toro   2020  9:15 AM Lacey Bangeas  [facility] FELICIA Kenney

## 2020-07-28 ENCOUNTER — TELEPHONE (OUTPATIENT)
Dept: CARDIOLOGY CLINIC | Age: 75
End: 2020-07-28

## 2020-09-02 RX ORDER — CARVEDILOL 12.5 MG/1
12.5 TABLET ORAL 2 TIMES DAILY WITH MEALS
Qty: 180 TABLET | Refills: 3 | Status: SHIPPED
Start: 2020-09-02 | End: 2021-07-27

## 2020-09-02 RX ORDER — FUROSEMIDE 20 MG/1
20 TABLET ORAL DAILY
Qty: 90 TABLET | Refills: 1 | Status: SHIPPED
Start: 2020-09-02 | End: 2020-10-08 | Stop reason: SDUPTHER

## 2020-09-17 RX ORDER — WARFARIN SODIUM 5 MG/1
5 TABLET ORAL DAILY
Qty: 90 TABLET | Refills: 3 | Status: SHIPPED
Start: 2020-09-17 | End: 2021-09-29

## 2020-10-08 ENCOUNTER — OFFICE VISIT (OUTPATIENT)
Dept: CARDIOLOGY CLINIC | Age: 75
End: 2020-10-08
Payer: MEDICARE

## 2020-10-08 VITALS
WEIGHT: 159.6 LBS | RESPIRATION RATE: 16 BRPM | BODY MASS INDEX: 29.37 KG/M2 | HEART RATE: 58 BPM | HEIGHT: 62 IN | SYSTOLIC BLOOD PRESSURE: 128 MMHG | DIASTOLIC BLOOD PRESSURE: 72 MMHG

## 2020-10-08 PROCEDURE — 99214 OFFICE O/P EST MOD 30 MIN: CPT | Performed by: INTERNAL MEDICINE

## 2020-10-08 PROCEDURE — 93000 ELECTROCARDIOGRAM COMPLETE: CPT | Performed by: INTERNAL MEDICINE

## 2020-10-08 NOTE — PROGRESS NOTES
[facility] Cardiology Progress Note  Dr. Brayden Bose      Referring Physician: Bernice Christopher MD  CHIEF COMPLAINT:   Chief Complaint   Patient presents with    Cardiomyopathy     Denies chest pain, shortness of breath, swelling in feet or ankles    Congestive Heart Failure       HISTORY OF PRESENT ILLNESS:   Patient is a 77years old female with a history of CAD status post CABG in 2017, atrial fibrillation, cardiomyopathy, chronic systolic congestive heart failure and hyperlipidemia, is here for a follow up visit. Patient denies any chest pain, no shortness of breath, no lightheadedness, no dizziness, no palpitations, no pedal edema, no PND, no orthopnea, no syncope, no presyncopal episodes.   Functional capacity is at baseline    Past Medical History:   Diagnosis Date    Acute systolic congestive heart failure ([address] 4/24/2017    Atrial fibrillation (HCC)     CAD (coronary artery disease)     History of echocardiogram 04/14/2017    EF 26%    Hyperlipidemia 4/24/2017    Ischemic cardiomyopathy     Non-rheumatic tricuspid valve insufficiency     Nonrheumatic mitral (valve) insufficiency          Past Surgical History:   Procedure Laterality Date    CARDIAC CATHETERIZATION  04/17/2017    Dr. Meek Stuart GRAFT  04/17/2017    DIAGNOSTIC CARDIAC CATH LAB PROCEDURE  04/17/2017    Dr. Nomi Segundo         Current Outpatient Medications   Medication Sig Dispense Refill    warfarin (COUMADIN) 5 MG tablet Take 1 tablet by mouth daily 90 tablet 3    carvedilol (COREG) 12.5 MG tablet Take 1 tablet by mouth 2 times daily (with meals) 180 tablet 3    docusate sodium (COLACE) 100 MG capsule Take 100 mg by mouth 2 times daily      atorvastatin (LIPITOR) 40 MG tablet Take 1 tablet by mouth daily 30 tablet 3    lisinopril (PRINIVIL;ZESTRIL) 2.5 MG tablet TAKE 2 TABLETS BY MOUTH EVERY  tablet 3    furosemide (LASIX) 20 MG tablet Take 1 tablet by mouth daily 90 tablet 3     No current facility-administered medications for this visit. Allergies as of 10/08/2020    (No Known Allergies)       Social History     Socioeconomic History    Marital status:       Spouse name: Not on file    Number of children: Not on file    Years of education: Not on file    Highest education level: Not on file   Occupational History    Not on file   Social Needs    Financial resource strain: Not on file    Food insecurity     Worry: Not on file     Inability: Not on file    Transportation needs     Medical: Not on file     Non-medical: Not on file   Tobacco Use    Smoking status: Former Smoker     Last attempt to quit: 2002     Years since quittin.5    Smokeless tobacco: Never Used   Substance and Sexual Activity    Alcohol use: No     Comment: 1 pot coffee per day    Drug use: No    Sexual activity: Never   Lifestyle    Physical activity     Days per week: Not on file     Minutes per session: Not on file    Stress: Not on file   Relationships    Social connections     Talks on phone: Not on file     Gets together: Not on file     Attends Islam service: Not on file     Active member of club or organization: Not on file     Attends meetings of clubs or organizations: Not on file     Relationship status: Not on file    Intimate partner violence     Fear of current or ex partner: Not on file     Emotionally abused: Not on file     Physically abused: Not on file     Forced sexual activity: Not on file   Other Topics Concern    Not on file   Social History Narrative    Not on file       No family Hx for early CAD    REVIEW OF SYSTEMS:   CONSTITUTIONAL:  negative for  fevers, chills, sweats and fatigue  HEENT:  negative for  tinnitus, earaches, nasal congestion and epistaxis  RESPIRATORY:  negative for  dry cough, cough with sputum, dyspnea, wheezing and hemoptysis  GASTROINTESTINAL:  negative for nausea, vomiting, diarrhea, constipation, pruritus and jaundice  HEMATOLOGIC/LYMPHATIC:  negative for easy bruising, bleeding, lymphadenopathy and petechiae  ENDOCRINE:  negative for heat intolerance, cold intolerance, tremor, hair loss and diabetic symptoms including neither polyuria nor polydipsia nor blurred vision  MUSCULOSKELETAL:  negative for  myalgias, arthralgias, joint swelling, stiff joints and decreased range of motion  NEUROLOGICAL:  negative for memory problems, speech problems, visual disturbance, dysphagia, weakness and numbness      PHYSICAL EXAM:   CONSTITUTIONAL:  awake, alert, cooperative, no apparent distress, and appears stated age  HEAD:  normocepalic, without obvious abnormality, atraumatic, pink, moist mucous membranes. NECK:  Supple, symmetrical, trachea midline, no adenopathy, thyroid symmetric, not enlarged and no tenderness, skin normal  LUNGS:  No increased work of breathing, good air exchange, clear to auscultation bilaterally, no crackles or wheezing  CARDIOVASCULAR:  Normal apical impulse,Irregularly irregular, normal S1 and S2, no S3 , 3/6 systolic murmur at the apex, 3/6 systolic murmur at the left lower sternal border,no JVD, no carotid bruit, no pedal edema, good carotid upstroke bilaterally. ABDOMEN:  Soft, nontender, no masses, no hepatomegaly or splenomegaly, BS+  CHEST: nontender to palpation, expands symmetrically  MUSCULOSKELETAL:  No clubbing no cyanosis. there is no redness, warmth, or swelling of the joints  full range of motion noted  NEUROLOGIC:  Alert, awake,oriented x3  SKIN:  no bruising or bleeding, normal skin color, texture, turgor and no redness, warmth, or swelling    /72   Pulse 58   Resp 16   Ht 5' 2\" (1.575 m)   Wt 159 lb 9.6 oz (72.4 kg)   BMI 29.19 kg/m²     DATA:   I personally reviewed the visit EKG with the following interpretation: Atrial fibrillation with slow ventricular response, nonspecific intraventricular conduction delay, old inferior wall MI age undetermined    EKG 6/26/20 Atrial fibrillation  Nonspecific intraventricular conduction delay  Nonspecific ST and T wave abnormality  Abnormal ECG  When compared with ECG of 13-FEB-2020 22:50,  No significant change was found     ECHO: 2/14/20 Summary   No previous echo for comparison. Technically adequate study. Normal left ventricular wall thickness. Ejection fraction is visually estimated at 35%. Abnormal (paradoxical) motion consistent with conduction abnormality. Mild to moderate mitral regurgitation is present. Moderate tricuspid regurgitation.    RVSP is normal.    Angiography: 4/17/17 Findings:  Left main: 70% distal stenosis  LAD: 60% mid stenosis  Circumflex: Luminal irregularities  RCA: 80-90% proximal hazy lesion    CABG 4/20/17 x2, left internal mammary artery to the LAD, saphenous vein graft to the circ posterolateral.     Cardiology Labs: BMP:    Lab Results   Component Value Date     06/27/2020    K 4.3 06/27/2020     06/27/2020    CO2 25 06/27/2020    BUN 16 06/27/2020    CREATININE 1.0 06/27/2020     CMP:    Lab Results   Component Value Date     06/27/2020    K 4.3 06/27/2020     06/27/2020    CO2 25 06/27/2020    BUN 16 06/27/2020    CREATININE 1.0 06/27/2020    PROT 7.0 06/26/2020     CBC:    Lab Results   Component Value Date    WBC 11.0 06/27/2020    RBC 4.31 06/27/2020    HGB 13.1 06/27/2020    HCT 41.1 06/27/2020    MCV 95.4 06/27/2020    RDW 14.4 06/27/2020     06/27/2020     PT/INR:  No results found for: PTINR  PT/INR Warfarin:  No components found for: PTPATWAR, PTINRWAR  PTT:    Lab Results   Component Value Date    APTT 25.9 04/20/2017     PTT Heparin:  No components found for: APTTHEP  Magnesium:    Lab Results   Component Value Date    MG 2.3 04/21/2017     TSH:    Lab Results   Component Value Date    TSH 4.630 02/14/2020     TROPONIN:  No components found for: TROP  BNP:    Lab Results   Component Value Date     04/14/2017     FASTING LIPID PANEL:    Lab Results

## 2020-10-09 RX ORDER — FUROSEMIDE 20 MG/1
20 TABLET ORAL DAILY
Qty: 90 TABLET | Refills: 3 | Status: SHIPPED
Start: 2020-10-09 | End: 2021-07-27

## 2020-10-22 RX ORDER — LISINOPRIL 2.5 MG/1
TABLET ORAL
Qty: 180 TABLET | Refills: 3 | Status: SHIPPED
Start: 2020-10-22 | End: 2021-11-02 | Stop reason: SDUPTHER

## 2021-07-27 RX ORDER — CARVEDILOL 12.5 MG/1
TABLET ORAL
Qty: 180 TABLET | Refills: 3 | Status: ON HOLD
Start: 2021-07-27 | End: 2022-06-04 | Stop reason: HOSPADM

## 2021-07-27 RX ORDER — FUROSEMIDE 20 MG/1
TABLET ORAL
Qty: 90 TABLET | Refills: 3 | Status: SHIPPED
Start: 2021-07-27 | End: 2022-07-29 | Stop reason: SDUPTHER

## 2021-09-29 RX ORDER — WARFARIN SODIUM 5 MG/1
TABLET ORAL
Qty: 90 TABLET | Refills: 3 | Status: SHIPPED
Start: 2021-09-29 | End: 2022-07-22

## 2021-10-04 RX ORDER — LISINOPRIL 2.5 MG/1
2.5 TABLET ORAL 2 TIMES DAILY
Qty: 180 TABLET | Refills: 3 | OUTPATIENT
Start: 2021-10-04

## 2021-11-01 ENCOUNTER — TELEPHONE (OUTPATIENT)
Dept: ADMINISTRATIVE | Age: 76
End: 2021-11-01

## 2021-11-04 RX ORDER — LISINOPRIL 2.5 MG/1
TABLET ORAL
Qty: 180 TABLET | Refills: 3 | Status: SHIPPED | OUTPATIENT
Start: 2021-11-04

## 2022-01-20 NOTE — PROGRESS NOTES
[facility] Cardiology Progress Note  Dr. Skylar Rajput      Referring Physician: Lupis Coronado MD  CHIEF COMPLAINT:   Chief Complaint   Patient presents with    Coronary Artery Disease     15 month ov. Denies ED/Hosp admits. No cardiac complaints.  Congestive Heart Failure    Cardiomyopathy    Atrial Fibrillation       HISTORY OF PRESENT ILLNESS:   Patient is a 69years old female with a history of CAD status post CABG in 2017, atrial fibrillation, cardiomyopathy, chronic systolic congestive heart failure and hyperlipidemia, is here for a follow up visit. Patient denies any chest pain, no shortness of breath, no lightheadedness, no dizziness, no palpitations, no pedal edema, no PND, no orthopnea, no syncope, no presyncopal episodes.   Functional capacity is at baseline    Past Medical History:   Diagnosis Date    Acute systolic congestive heart failure ([address] 4/24/2017    Atrial fibrillation (HCC)     CAD (coronary artery disease)     History of echocardiogram 04/14/2017    EF 26%    Hyperlipidemia 4/24/2017    Ischemic cardiomyopathy     Non-rheumatic tricuspid valve insufficiency     Nonrheumatic mitral (valve) insufficiency          Past Surgical History:   Procedure Laterality Date    CARDIAC CATHETERIZATION  04/17/2017    Dr. Chaitanya Headley GRAFT  04/17/2017    DIAGNOSTIC CARDIAC CATH LAB PROCEDURE  04/17/2017    Dr. Russell Butler         Current Outpatient Medications   Medication Sig Dispense Refill    lisinopril (PRINIVIL;ZESTRIL) 2.5 MG tablet TAKE 2 TABLETS BY MOUTH EVERY  tablet 3    warfarin (COUMADIN) 5 MG tablet TAKE 1 TABLET BY MOUTH EVERY DAY (Patient taking differently: Currently alt 2.5mg/5mg per PCP) 90 tablet 3    furosemide (LASIX) 20 MG tablet TAKE 1 TABLET BY MOUTH EVERY DAY 90 tablet 3    carvedilol (COREG) 12.5 MG tablet TAKE 1 TABLET BY MOUTH TWICE A DAY WITH MEALS 180 tablet 3    docusate sodium (COLACE) 100 MG capsule Take 100 mg by mouth 2 times daily      atorvastatin (LIPITOR) 40 MG tablet Take 1 tablet by mouth daily 30 tablet 3     No current facility-administered medications for this visit. Allergies as of 2022    (No Known Allergies)       Social History     Socioeconomic History    Marital status:      Spouse name: Not on file    Number of children: Not on file    Years of education: Not on file    Highest education level: Not on file   Occupational History    Not on file   Tobacco Use    Smoking status: Former Smoker     Packs/day: 0.50     Years: 20.00     Pack years: 10.00     Types: Cigarettes     Quit date: 2002     Years since quittin.7    Smokeless tobacco: Never Used   Vaping Use    Vaping Use: Never used   Substance and Sexual Activity    Alcohol use: Not Currently    Drug use: Never    Sexual activity: Never   Other Topics Concern    Not on file   Social History Narrative    Drinks 1 pot of coffee daily     Social Determinants of Health     Financial Resource Strain:     Difficulty of Paying Living Expenses: Not on file   Food Insecurity:     Worried About Running Out of Food in the Last Year: Not on file    Ami of Food in the Last Year: Not on file   Transportation Needs:     Lack of Transportation (Medical): Not on file    Lack of Transportation (Non-Medical):  Not on file   Physical Activity:     Days of Exercise per Week: Not on file    Minutes of Exercise per Session: Not on file   Stress:     Feeling of Stress : Not on file   Social Connections:     Frequency of Communication with Friends and Family: Not on file    Frequency of Social Gatherings with Friends and Family: Not on file    Attends Mandaeism Services: Not on file    Active Member of Clubs or Organizations: Not on file    Attends Club or Organization Meetings: Not on file    Marital Status: Not on file   Intimate Partner Violence:     Fear of Current or Ex-Partner: Not on file    Emotionally Abused: Not on file  Physically Abused: Not on file    Sexually Abused: Not on file   Housing Stability:     Unable to Pay for Housing in the Last Year: Not on file    Number of Places Lived in the Last Year: Not on file    Unstable Housing in the Last Year: Not on file       No family Hx for early CAD    REVIEW OF SYSTEMS:   CONSTITUTIONAL:  negative for  fevers, chills, sweats and fatigue  HEENT:  negative for  tinnitus, earaches, nasal congestion and epistaxis  RESPIRATORY:  negative for  dry cough, cough with sputum, dyspnea, wheezing and hemoptysis  GASTROINTESTINAL:  negative for nausea, vomiting, diarrhea, constipation, pruritus and jaundice  HEMATOLOGIC/LYMPHATIC:  negative for easy bruising, bleeding, lymphadenopathy and petechiae  ENDOCRINE:  negative for heat intolerance, cold intolerance, tremor, hair loss and diabetic symptoms including neither polyuria nor polydipsia nor blurred vision  MUSCULOSKELETAL:  negative for  myalgias, arthralgias, joint swelling, stiff joints and decreased range of motion  NEUROLOGICAL:  negative for memory problems, speech problems, visual disturbance, dysphagia, weakness and numbness      PHYSICAL EXAM:   CONSTITUTIONAL:  awake, alert, cooperative, no apparent distress, and appears stated age  HEAD:  normocepalic, without obvious abnormality, atraumatic, pink, moist mucous membranes. NECK:  Supple, symmetrical, trachea midline, no adenopathy, thyroid symmetric, not enlarged and no tenderness, skin normal  LUNGS:  No increased work of breathing, good air exchange, clear to auscultation bilaterally, no crackles or wheezing  CARDIOVASCULAR:  Normal apical impulse,Irregularly irregular, normal S1 and S2, no S3 , 3/6 systolic murmur at the apex, 3/6 systolic murmur at the left lower sternal border,no JVD, no carotid bruit, no pedal edema, good carotid upstroke bilaterally.   ABDOMEN:  Soft, nontender, no masses, no hepatomegaly or splenomegaly, BS+  CHEST: nontender to palpation, expands symmetrically  MUSCULOSKELETAL:  No clubbing no cyanosis. there is no redness, warmth, or swelling of the joints  full range of motion noted  NEUROLOGIC:  Alert, awake,oriented x3  SKIN:  no bruising or bleeding, normal skin color, texture, turgor and no redness, warmth, or swelling    /68 (Site: Right Upper Arm, Position: Sitting, Cuff Size: Medium Adult)   Pulse 74   Resp 16   Ht 5' 2\" (1.575 m)   Wt 171 lb 12.8 oz (77.9 kg)   SpO2 97%   BMI 31.42 kg/m²     DATA:   I personally reviewed the visit EKG with the following interpretation: Atrial fibrillation, controlled ventricular response, left bundle branch block    EKG 10/8/20 Atrial fibrillation with slow ventricular response, nonspecific intraventricular conduction delay, old inferior wall MI age undetermined       ECHO: 2/14/20 Summary   No previous echo for comparison. Technically adequate study. Normal left ventricular wall thickness. Ejection fraction is visually estimated at 35%. Abnormal (paradoxical) motion consistent with conduction abnormality. Mild to moderate mitral regurgitation is present. Moderate tricuspid regurgitation.    RVSP is normal.    Angiography: 4/17/17 Findings:  Left main: 70% distal stenosis  LAD: 60% mid stenosis  Circumflex: Luminal irregularities  RCA: 80-90% proximal hazy lesion    CABG 4/20/17 x2, left internal mammary artery to the LAD, saphenous vein graft to the circ posterolateral.     Cardiology Labs: BMP:    Lab Results   Component Value Date     06/27/2020    K 4.3 06/27/2020     06/27/2020    CO2 25 06/27/2020    BUN 16 06/27/2020    CREATININE 1.0 06/27/2020     CMP:    Lab Results   Component Value Date     06/27/2020    K 4.3 06/27/2020     06/27/2020    CO2 25 06/27/2020    BUN 16 06/27/2020    CREATININE 1.0 06/27/2020    PROT 7.0 06/26/2020     CBC:    Lab Results   Component Value Date    WBC 11.0 06/27/2020    RBC 4.31 06/27/2020    HGB 13.1 06/27/2020    HCT 41.1 06/27/2020    MCV 95.4 06/27/2020    RDW 14.4 06/27/2020     06/27/2020     PT/INR:  No results found for: PTINR  PT/INR Warfarin:  No components found for: PTPATWAR, PTINRWAR  PTT:    Lab Results   Component Value Date    APTT 25.9 04/20/2017     PTT Heparin:  No components found for: APTTHEP  Magnesium:    Lab Results   Component Value Date    MG 2.3 04/21/2017     TSH:    Lab Results   Component Value Date    TSH 4.630 02/14/2020     TROPONIN:  No components found for: TROP  BNP:    Lab Results   Component Value Date     04/14/2017     FASTING LIPID PANEL:    Lab Results   Component Value Date    CHOL 108 06/27/2020    HDL 33 06/27/2020    TRIG 71 06/27/2020     No orders to display     I have personally reviewed the laboratory, cardiac diagnostic and radiographic testing as outlined above:      IMPRESSION:  1. Atrial fibrillation: Persistent,  Rate is controlled, [id number] score of 5, on Coumadin. 2. Systolic congestive heart failure: Compensated, continue current treatment  3. CAD: Status post CABG in April 2017 with a LIMA to LAD, SVG to left PLV, doing well. 4. Cardiomyopathy: Ischemic, declined AICD  5. Mild-to-moderate mitral valve regurgitation  6. ModerateTricuspid valve regurgitation  7. Hypertension: Controlled  8. Chronic anticoagulation: Stated Dr. Inna Dawkins follows with INR    RECOMMENDATIONS:   1. Continue current treatment  2. Preventive cardiology: Low-salt, low-cholesterol diet, daily exercise, total cholesterol of less than 200, LDL of less than 70,were all advised. 3.  CHF: Daily weight, take an extra Lasix for weight gain of more than 2-3 pounds in 24 hours, compliance with diuretics, low-salt diet were all advised. 4.  Compliance with the Coumadin dose, PT and INR check appointments was strongly advised  5.   Increase risk of bleeding due to being on anti-coagulation, symptoms and signs of bleeding discussed with patient, patient was advised to seek medical attention at the earliest symptoms or signs of bleeding. 6.  Follow-up with Dr. Charlie Cruz as scheduled  7. Follow-up with Dr. Juana Sinclair in 6 months, sooner if symptomatic for any reason    I have reviewed my findings and recommendations with patient    Electronically signed by Matthew James MD on 1/21/2022 at 9:57 AM  NOTE: This report was transcribed using voice recognition software.  Every effort was made to ensure accuracy; however, inadvertent computerized transcription errors may be present

## 2022-01-21 ENCOUNTER — OFFICE VISIT (OUTPATIENT)
Dept: CARDIOLOGY CLINIC | Age: 77
End: 2022-01-21
Payer: MEDICARE

## 2022-01-21 VITALS
HEART RATE: 74 BPM | HEIGHT: 62 IN | SYSTOLIC BLOOD PRESSURE: 130 MMHG | RESPIRATION RATE: 16 BRPM | DIASTOLIC BLOOD PRESSURE: 68 MMHG | WEIGHT: 171.8 LBS | OXYGEN SATURATION: 97 % | BODY MASS INDEX: 31.62 KG/M2

## 2022-01-21 DIAGNOSIS — I25.10 CORONARY ARTERY DISEASE INVOLVING NATIVE CORONARY ARTERY OF NATIVE HEART WITHOUT ANGINA PECTORIS: Primary | ICD-10-CM

## 2022-01-21 PROCEDURE — 4040F PNEUMOC VAC/ADMIN/RCVD: CPT | Performed by: INTERNAL MEDICINE

## 2022-01-21 PROCEDURE — G8484 FLU IMMUNIZE NO ADMIN: HCPCS | Performed by: INTERNAL MEDICINE

## 2022-01-21 PROCEDURE — 1036F TOBACCO NON-USER: CPT | Performed by: INTERNAL MEDICINE

## 2022-01-21 PROCEDURE — 1123F ACP DISCUSS/DSCN MKR DOCD: CPT | Performed by: INTERNAL MEDICINE

## 2022-01-21 PROCEDURE — 1090F PRES/ABSN URINE INCON ASSESS: CPT | Performed by: INTERNAL MEDICINE

## 2022-01-21 PROCEDURE — G8417 CALC BMI ABV UP PARAM F/U: HCPCS | Performed by: INTERNAL MEDICINE

## 2022-01-21 PROCEDURE — 99214 OFFICE O/P EST MOD 30 MIN: CPT | Performed by: INTERNAL MEDICINE

## 2022-01-21 PROCEDURE — G8427 DOCREV CUR MEDS BY ELIG CLIN: HCPCS | Performed by: INTERNAL MEDICINE

## 2022-01-21 PROCEDURE — G8400 PT W/DXA NO RESULTS DOC: HCPCS | Performed by: INTERNAL MEDICINE

## 2022-01-21 PROCEDURE — 93000 ELECTROCARDIOGRAM COMPLETE: CPT | Performed by: INTERNAL MEDICINE

## 2022-05-17 NOTE — TELEPHONE ENCOUNTER
Pt called for HFU w/ DR Correia Part, she was DC [facility] 6/28, please contact pt at [phone]
Additional Notes: Patient consent was obtained to proceed with the visit and recommended plan of care after discussion of all risks and benefits, including the risks of COVID-19 exposure.
Detail Level: Simple

## 2022-05-26 ENCOUNTER — APPOINTMENT (OUTPATIENT)
Dept: CT IMAGING | Age: 77
DRG: 872 | End: 2022-05-26
Payer: MEDICARE

## 2022-05-26 ENCOUNTER — HOSPITAL ENCOUNTER (INPATIENT)
Age: 77
LOS: 9 days | Discharge: NURSING, REHAB OR LONG-TERM CARE | DRG: 872 | End: 2022-06-04
Attending: STUDENT IN AN ORGANIZED HEALTH CARE EDUCATION/TRAINING PROGRAM | Admitting: INTERNAL MEDICINE
Payer: MEDICARE

## 2022-05-26 ENCOUNTER — APPOINTMENT (OUTPATIENT)
Dept: GENERAL RADIOLOGY | Age: 77
DRG: 872 | End: 2022-05-26
Payer: MEDICARE

## 2022-05-26 DIAGNOSIS — R55 SYNCOPE AND COLLAPSE: Primary | ICD-10-CM

## 2022-05-26 DIAGNOSIS — R50.9 FEVER OF UNKNOWN ORIGIN: ICD-10-CM

## 2022-05-26 LAB
ALBUMIN SERPL-MCNC: 4.4 G/DL (ref 3.5–5.2)
ALP BLD-CCNC: 127 U/L (ref 35–104)
ALT SERPL-CCNC: 16 U/L (ref 0–32)
ANION GAP SERPL CALCULATED.3IONS-SCNC: 13 MMOL/L (ref 7–16)
AST SERPL-CCNC: 28 U/L (ref 0–31)
BACTERIA: ABNORMAL /HPF
BASOPHILS ABSOLUTE: 0.04 E9/L (ref 0–0.2)
BASOPHILS RELATIVE PERCENT: 0.3 % (ref 0–2)
BILIRUB SERPL-MCNC: 1.3 MG/DL (ref 0–1.2)
BILIRUBIN URINE: NEGATIVE
BLOOD, URINE: NEGATIVE
BUN BLDV-MCNC: 20 MG/DL (ref 6–23)
CALCIUM SERPL-MCNC: 9.2 MG/DL (ref 8.6–10.2)
CHLORIDE BLD-SCNC: 100 MMOL/L (ref 98–107)
CLARITY: CLEAR
CO2: 26 MMOL/L (ref 22–29)
COLOR: YELLOW
CREAT SERPL-MCNC: 1 MG/DL (ref 0.5–1)
EOSINOPHILS ABSOLUTE: 0.01 E9/L (ref 0.05–0.5)
EOSINOPHILS RELATIVE PERCENT: 0.1 % (ref 0–6)
GFR AFRICAN AMERICAN: >60
GFR NON-AFRICAN AMERICAN: 54 ML/MIN/1.73
GLUCOSE BLD-MCNC: 122 MG/DL (ref 74–99)
GLUCOSE URINE: NEGATIVE MG/DL
HCT VFR BLD CALC: 44 % (ref 34–48)
HEMOGLOBIN: 14.6 G/DL (ref 11.5–15.5)
IMMATURE GRANULOCYTES #: 0.09 E9/L
IMMATURE GRANULOCYTES %: 0.6 % (ref 0–5)
INFLUENZA A BY PCR: NOT DETECTED
INFLUENZA B BY PCR: NOT DETECTED
INR BLD: 2.8
KETONES, URINE: NEGATIVE MG/DL
LACTIC ACID, SEPSIS: 1.3 MMOL/L (ref 0.5–1.9)
LEUKOCYTE ESTERASE, URINE: NEGATIVE
LYMPHOCYTES ABSOLUTE: 4.75 E9/L (ref 1.5–4)
LYMPHOCYTES RELATIVE PERCENT: 32.7 % (ref 20–42)
MAGNESIUM: 2 MG/DL (ref 1.6–2.6)
MCH RBC QN AUTO: 30.6 PG (ref 26–35)
MCHC RBC AUTO-ENTMCNC: 33.2 % (ref 32–34.5)
MCV RBC AUTO: 92.2 FL (ref 80–99.9)
MONOCYTES ABSOLUTE: 0.41 E9/L (ref 0.1–0.95)
MONOCYTES RELATIVE PERCENT: 2.8 % (ref 2–12)
NEUTROPHILS ABSOLUTE: 9.22 E9/L (ref 1.8–7.3)
NEUTROPHILS RELATIVE PERCENT: 63.5 % (ref 43–80)
NITRITE, URINE: NEGATIVE
PDW BLD-RTO: 13 FL (ref 11.5–15)
PH UA: 5.5 (ref 5–9)
PLATELET # BLD: 151 E9/L (ref 130–450)
PMV BLD AUTO: 8.5 FL (ref 7–12)
POTASSIUM SERPL-SCNC: 4 MMOL/L (ref 3.5–5)
PRO-BNP: 2115 PG/ML (ref 0–450)
PROTEIN UA: NEGATIVE MG/DL
PROTHROMBIN TIME: 31.8 SEC (ref 9.3–12.4)
RBC # BLD: 4.77 E12/L (ref 3.5–5.5)
RBC UA: ABNORMAL /HPF (ref 0–2)
REASON FOR REJECTION: NORMAL
REJECTED TEST: NORMAL
SARS-COV-2, NAAT: NOT DETECTED
SODIUM BLD-SCNC: 139 MMOL/L (ref 132–146)
SPECIFIC GRAVITY UA: 1.01 (ref 1–1.03)
TOTAL CK: 166 U/L (ref 20–180)
TOTAL PROTEIN: 7.5 G/DL (ref 6.4–8.3)
TROPONIN, HIGH SENSITIVITY: 31 NG/L (ref 0–9)
TROPONIN, HIGH SENSITIVITY: 39 NG/L (ref 0–9)
UROBILINOGEN, URINE: 0.2 E.U./DL
WBC # BLD: 14.5 E9/L (ref 4.5–11.5)
WBC UA: ABNORMAL /HPF (ref 0–5)

## 2022-05-26 PROCEDURE — 71260 CT THORAX DX C+: CPT

## 2022-05-26 PROCEDURE — 2060000000 HC ICU INTERMEDIATE R&B

## 2022-05-26 PROCEDURE — 83880 ASSAY OF NATRIURETIC PEPTIDE: CPT

## 2022-05-26 PROCEDURE — 83605 ASSAY OF LACTIC ACID: CPT

## 2022-05-26 PROCEDURE — 36415 COLL VENOUS BLD VENIPUNCTURE: CPT

## 2022-05-26 PROCEDURE — 85610 PROTHROMBIN TIME: CPT

## 2022-05-26 PROCEDURE — 85025 COMPLETE CBC W/AUTO DIFF WBC: CPT

## 2022-05-26 PROCEDURE — 87502 INFLUENZA DNA AMP PROBE: CPT

## 2022-05-26 PROCEDURE — 6360000004 HC RX CONTRAST MEDICATION: Performed by: RADIOLOGY

## 2022-05-26 PROCEDURE — 84484 ASSAY OF TROPONIN QUANT: CPT

## 2022-05-26 PROCEDURE — 87088 URINE BACTERIA CULTURE: CPT

## 2022-05-26 PROCEDURE — 83735 ASSAY OF MAGNESIUM: CPT

## 2022-05-26 PROCEDURE — 72125 CT NECK SPINE W/O DYE: CPT

## 2022-05-26 PROCEDURE — 82550 ASSAY OF CK (CPK): CPT

## 2022-05-26 PROCEDURE — 87635 SARS-COV-2 COVID-19 AMP PRB: CPT

## 2022-05-26 PROCEDURE — 62270 DX LMBR SPI PNXR: CPT

## 2022-05-26 PROCEDURE — 6370000000 HC RX 637 (ALT 250 FOR IP): Performed by: PHYSICIAN ASSISTANT

## 2022-05-26 PROCEDURE — 87186 SC STD MICRODIL/AGAR DIL: CPT

## 2022-05-26 PROCEDURE — 93005 ELECTROCARDIOGRAM TRACING: CPT | Performed by: PHYSICIAN ASSISTANT

## 2022-05-26 PROCEDURE — 87077 CULTURE AEROBIC IDENTIFY: CPT

## 2022-05-26 PROCEDURE — 74177 CT ABD & PELVIS W/CONTRAST: CPT

## 2022-05-26 PROCEDURE — 70450 CT HEAD/BRAIN W/O DYE: CPT

## 2022-05-26 PROCEDURE — 87040 BLOOD CULTURE FOR BACTERIA: CPT

## 2022-05-26 PROCEDURE — 2580000003 HC RX 258: Performed by: STUDENT IN AN ORGANIZED HEALTH CARE EDUCATION/TRAINING PROGRAM

## 2022-05-26 PROCEDURE — 81001 URINALYSIS AUTO W/SCOPE: CPT

## 2022-05-26 PROCEDURE — 71045 X-RAY EXAM CHEST 1 VIEW: CPT

## 2022-05-26 PROCEDURE — 51701 INSERT BLADDER CATHETER: CPT

## 2022-05-26 PROCEDURE — 2580000003 HC RX 258: Performed by: RADIOLOGY

## 2022-05-26 PROCEDURE — 80053 COMPREHEN METABOLIC PANEL: CPT

## 2022-05-26 PROCEDURE — 99285 EMERGENCY DEPT VISIT HI MDM: CPT

## 2022-05-26 RX ORDER — 0.9 % SODIUM CHLORIDE 0.9 %
500 INTRAVENOUS SOLUTION INTRAVENOUS ONCE
Status: COMPLETED | OUTPATIENT
Start: 2022-05-26 | End: 2022-05-26

## 2022-05-26 RX ORDER — SODIUM CHLORIDE 0.9 % (FLUSH) 0.9 %
10 SYRINGE (ML) INJECTION PRN
Status: DISCONTINUED | OUTPATIENT
Start: 2022-05-26 | End: 2022-05-27 | Stop reason: SDUPTHER

## 2022-05-26 RX ORDER — ACETAMINOPHEN 325 MG/1
650 TABLET ORAL ONCE
Status: COMPLETED | OUTPATIENT
Start: 2022-05-26 | End: 2022-05-26

## 2022-05-26 RX ADMIN — IOPAMIDOL 90 ML: 755 INJECTION, SOLUTION INTRAVENOUS at 18:47

## 2022-05-26 RX ADMIN — SODIUM CHLORIDE 500 ML: 9 INJECTION, SOLUTION INTRAVENOUS at 22:06

## 2022-05-26 RX ADMIN — Medication 10 ML: at 18:47

## 2022-05-26 RX ADMIN — ACETAMINOPHEN 650 MG: 325 TABLET ORAL at 15:51

## 2022-05-26 NOTE — ED PROVIDER NOTES
[facility]  Department of Emergency Medicine     Written by: Ashley Fulton DO  Patient Name: Pan Aguilar  Attending Provider: Janelle Silverio MD  Admit Date: 2022  3:52 PM  MRN: [id number]                   : 1945        Chief Complaint   Patient presents with    Loss of Consciousness     , pt states she was on floor for about 2 hours then called daughter. +blood thinner, +hit head, complaining of back pain     - Chief complaint    Ms. Eleni Davison is a 68 yo female who presents to the ED following a syncopal episode. Patient reports that she was walking out of the bathroom when she fell backwards and hit her head. That is lasting she remembers prior to blacking out. She notes that she has not had similar episodes before. She states that she was on the ground for roughly 2 hours prior to obtaining her cell phone to call her daughter. She is currently on warfarin and does endorse hitting her head. She has upper back and cervical paraspinal tenderness and endorses positive loss of consciousness. She has no focal neurological deficits on examination. Symptoms were brief in nature and have since resolved. She denies any aggravating relieving factors. She arrived to the emergency department febrile states she had not been sick recently. She denies any chills, nausea, vomiting, chest pain, shortness of breath, abdominal pain, bowel or urinary changes, headaches or vision changes. Review of Systems   Constitutional: Positive for fatigue. Negative for chills and fever. HENT: Negative for congestion, sinus pressure and sinus pain. Eyes: Negative for pain and redness. Respiratory: Negative for cough, chest tightness and shortness of breath. Cardiovascular: Negative for chest pain, palpitations and leg swelling. Gastrointestinal: Negative for abdominal pain, constipation, diarrhea, nausea and vomiting.    Genitourinary: Negative for dysuria, flank pain, spinal fluid for diagnostic testing    Consent: The patient provided verbal consent for this procedure. Procedure: The patient was placed in the right lateral decubitus position and the appropriate landmarks were identified. The area was prepped and draped in the usual sterile fashion. Anesthesia was obtained using 4 cc of 1% Lidocaine without epinephrine. A spinal needle was inserted at the L3- L4 level with the stylet in place until spinal fluid was returned. Opening pressure was not measured. At this point no fluid was able to be obtained and the patient was sent for a fluoroscopic guided lumbar puncture. The stylet was then replaced and the needle was withdrawn. A sterile dressing was placed over the site and the patient was placed in the supine position. The patient tolerated the procedure well. Complications: None        MDM  Number of Diagnoses or Management Options  Fever of unknown origin  Syncope and collapse  Diagnosis management comments: This is a 70 yo female who presents to the ED following a syncopal episode. Patient is EKG revealed atrial fibrillation with a rate of 100 without any acute interval or ST segment changes. Patient received Tylenol for her fever upon arrival.  CBC revealed mild leukocytosis with white count of 14.5. CMP was markedly benign aside from elevated total bili of 1.3 and an alk phos of 127. Initial troponin was 31 with a repeat of 39 and delta of 8 and a third has been ordered and is currently pending. Analysis was negative for any urinary tract infection. Lactate was normal.  BNP was elevated 2115. Her INR was mildly elevated at 2.8 which is within her normal limits. CK was normal at 166. Patient was negative for COVID and flu. CT head and cervical spine revealed no acute intracranial or cervical abnormality. CT chest revealed large hiatal hernia without pulmonary infiltrate or pleural effusion with cardiomegaly and atherosclerotic disease.   CT abdomen pelvis revealed cholelithiasis without cholecystitis degenerating fibroids of the uterus and right adnexal cyst.  Chest x-ray revealed no acute process. Patient received 500 mL of normal saline given her heart failure. Lumbar puncture was attempted in the department but was unsuccessful. Patient will be admitted for syncope and fever of unknown origin by Dr. Jarett Santacruz for further work-up and treatment.              --------------------------------------------- PAST HISTORY ---------------------------------------------  Past Medical History:  has a past medical history of Acute systolic congestive heart failure ([facility] [address] Atrial fibrillation ([facility] [address] CAD (coronary artery disease), History of echocardiogram, Hyperlipidemia, Ischemic cardiomyopathy, Non-rheumatic tricuspid valve insufficiency, and Nonrheumatic mitral (valve) insufficiency. Past Surgical History:  has a past surgical history that includes Cardiac catheterization (04/17/2017); Coronary artery bypass graft (04/17/2017); and Diagnostic Cardiac Cath Lab Procedure (04/17/2017). Social History:  reports that she quit smoking about 20 years ago. Her smoking use included cigarettes. She has a 10.00 pack-year smoking history. She has never used smokeless tobacco. She reports previous alcohol use. She reports that she does not use drugs. Family History: family history includes Alcohol Abuse in her brother; No Known Problems in her father, mother, and sister. The patients home medications have been reviewed. Allergies: Patient has no known allergies.     -------------------------------------------------- RESULTS -------------------------------------------------    LABS:  Results for orders placed or performed during the hospital encounter of 05/26/22   COVID-19, Rapid    Specimen: Nasopharyngeal Swab   Result Value Ref Range    SARS-CoV-2, NAAT Not Detected Not Detected   Rapid influenza A/B antigens    Specimen: Nasopharyngeal   Result Value Ref Range Influenza A by PCR Not Detected Not Detected    Influenza B by PCR Not Detected Not Detected   CBC with Auto Differential   Result Value Ref Range    WBC 14.5 (H) 4.5 - 11.5 E9/L    RBC 4.77 3.50 - 5.50 E12/L    Hemoglobin 14.6 11.5 - 15.5 g/dL    Hematocrit 44.0 34.0 - 48.0 %    MCV 92.2 80.0 - 99.9 fL    MCH 30.6 26.0 - 35.0 pg    MCHC 33.2 32.0 - 34.5 %    RDW 13.0 11.5 - 15.0 fL    Platelets 804 487 - 417 E9/L    MPV 8.5 7.0 - 12.0 fL    Neutrophils % 63.5 43.0 - 80.0 %    Immature Granulocytes % 0.6 0.0 - 5.0 %    Lymphocytes % 32.7 20.0 - 42.0 %    Monocytes % 2.8 2.0 - 12.0 %    Eosinophils % 0.1 0.0 - 6.0 %    Basophils % 0.3 0.0 - 2.0 %    Neutrophils Absolute 9.22 (H) 1.80 - 7.30 E9/L    Immature Granulocytes # 0.09 E9/L    Lymphocytes Absolute 4.75 (H) 1.50 - 4.00 E9/L    Monocytes Absolute 0.41 0.10 - 0.95 E9/L    Eosinophils Absolute 0.01 (L) 0.05 - 0.50 E9/L    Basophils Absolute 0.04 0.00 - 0.20 E9/L   Magnesium   Result Value Ref Range    Magnesium 2.0 1.6 - 2.6 mg/dL   Protime-INR   Result Value Ref Range    Protime 31.8 (H) 9.3 - 12.4 sec    INR 2.8    Comprehensive Metabolic Panel   Result Value Ref Range    Sodium 139 132 - 146 mmol/L    Potassium 4.0 3.5 - 5.0 mmol/L    Chloride 100 98 - 107 mmol/L    CO2 26 22 - 29 mmol/L    Anion Gap 13 7 - 16 mmol/L    Glucose 122 (H) 74 - 99 mg/dL    BUN 20 6 - 23 mg/dL    CREATININE 1.0 0.5 - 1.0 mg/dL    GFR Non-African American 54 >=60 mL/min/1.73    GFR African American >60     Calcium 9.2 8.6 - 10.2 mg/dL    Total Protein 7.5 6.4 - 8.3 g/dL    Albumin 4.4 3.5 - 5.2 g/dL    Total Bilirubin 1.3 (H) 0.0 - 1.2 mg/dL    Alkaline Phosphatase 127 (H) 35 - 104 U/L    ALT 16 0 - 32 U/L    AST 28 0 - 31 U/L   Troponin   Result Value Ref Range    Troponin, High Sensitivity 31 (H) 0 - 9 ng/L   CK   Result Value Ref Range    Total  20 - 180 U/L   Brain Natriuretic Peptide   Result Value Ref Range    Pro-BNP 2,115 (H) 0 - 450 pg/mL   Lactate, Sepsis   Result Value Ref Range    Lactic Acid, Sepsis 1.3 0.5 - 1.9 mmol/L   Urinalysis with Microscopic   Result Value Ref Range    Color, UA Yellow Straw/Yellow    Clarity, UA Clear Clear    Glucose, Ur Negative Negative mg/dL    Bilirubin Urine Negative Negative    Ketones, Urine Negative Negative mg/dL    Specific Gravity, UA 1.010 1.005 - 1.030    Blood, Urine Negative Negative    pH, UA 5.5 5.0 - 9.0    Protein, UA Negative Negative mg/dL    Urobilinogen, Urine 0.2 <2.0 E.U./dL    Nitrite, Urine Negative Negative    Leukocyte Esterase, Urine Negative Negative    WBC, UA 1-3 0 - 5 /HPF    RBC, UA 1-3 0 - 2 /HPF    Bacteria, UA RARE (A) None Seen /HPF   SPECIMEN REJECTION   Result Value Ref Range    Rejected Test TRP5     Reason for Rejection see below    Troponin   Result Value Ref Range    Troponin, High Sensitivity 39 (H) 0 - 9 ng/L   EKG 12 Lead   Result Value Ref Range    Ventricular Rate 100 BPM    Atrial Rate 110 BPM    QRS Duration 120 ms    Q-T Interval 294 ms    QTc Calculation (Bazett) 379 ms    R Axis 100 degrees    T Axis 173 degrees       RADIOLOGY:  CT HEAD WO CONTRAST   Final Result   CT HEAD WITHOUT CONTRAST:      1. No skull fracture or acute intracranial abnormality. CT CERVICAL SPINE WITHOUT CONTRAST:      1. No fracture or joint dislocation is seen. 2. Degenerative changes, as described. RECOMMENDATIONS:   Unavailable         CT CERVICAL SPINE WO CONTRAST   Final Result   CT HEAD WITHOUT CONTRAST:      1. No skull fracture or acute intracranial abnormality. CT CERVICAL SPINE WITHOUT CONTRAST:      1. No fracture or joint dislocation is seen. 2. Degenerative changes, as described. RECOMMENDATIONS:   Unavailable         CT CHEST W CONTRAST   Final Result   1. Large hiatal hernia   2. No pulmonary infiltrate or pleural effusion   3.  Cardiomegaly and atherosclerotic disease      RECOMMENDATIONS:   Unavailable         CT ABDOMEN PELVIS W IV CONTRAST Additional Contrast? None   Final Result 1. Moderate to large hiatal hernia. 2. Cholelithiasis without evidence of acute cholecystitis. 3. Probable degenerating fibroids within the uterus. 4. Right adnexal cyst measures approximately 3.7 cm in size. This is a simple   appearing cyst. Recommend follow-up ultrasound in 6-12 months. XR CHEST 1 VIEW   Final Result   No acute process. EKG:  This EKG is signed and interpreted by me. Rate: 100  Rhythm: Atrial fibrillation  Interpretation: atrial fibrillation (chronic)  Comparison: stable as compared to patient's most recent EKG      ------------------------- NURSING NOTES AND VITALS REVIEWED ---------------------------  Date / Time Roomed:  5/26/2022  3:52 PM  ED Bed Assignment:  39/39    The nursing notes within the ED encounter and vital signs as below have been reviewed. Patient Vitals for the past 24 hrs:   BP Temp Temp src Pulse Resp SpO2 Height Weight   05/26/22 2137 (!) 97/52 98.1 °F (36.7 °C) Oral 62 16 97 % -- --   05/26/22 1526 (!) 116/52 (!) 100.8 °F (38.2 °C) Oral (!) 111 18 95 % 5' 2\" (1.575 m) 160 lb (72.6 kg)   05/26/22 1519 -- (!) 101.2 °F (38.4 °C) Oral (!) 103 -- 97 % -- --       Oxygen Saturation Interpretation: Normal    ------------------------------------------ PROGRESS NOTES ------------------------------------------  Re-evaluation(s):  Time: 2330  Patients symptoms show no change  Repeat physical examination is not changed    Counseling:  I have spoken with the patient and discussed todays results, in addition to providing specific details for the plan of care and counseling regarding the diagnosis and prognosis. Their questions are answered at this time and they are agreeable with the plan of admission.    --------------------------------- ADDITIONAL PROVIDER NOTES ---------------------------------  Consultations:  Time: 2345. Spoke with Dr. Melburn Felty. Discussed case. They will admit the patient.   This patient's ED course included: a personal history and physicial examination, re-evaluation prior to disposition, multiple bedside re-evaluations, IV medications, cardiac monitoring and continuous pulse oximetry    This patient has remained hemodynamically stable during their ED course. Diagnosis:  1. Syncope and collapse    2. Fever of unknown origin        Disposition:  Patient's disposition: Admit to telemetry  Patient's condition is stable. Patient was seen and evaluated by myself and my attending Matthew Romero MD. Assessment and Plan discussed with attending provider, please see attestation for final plan of care.      DO Libia Beth DO  Resident  05/27/22 5408

## 2022-05-26 NOTE — ED NOTES
Department of Emergency Medicine  FIRST PROVIDER TRIAGE NOTE             Independent MLP           5/26/22  3:26 PM EDT    Date of Encounter: 5/26/22   MRN: [id number]      HPI: Kimberlyn Roman is a 69 y.o. female who presents to the ED for Loss of Consciousness (, pt states she was on floor for about 2 hours then called daughter. +blood thinner, +hit head, complaining of back pain )     Patient is a 71-year-old that is presenting for syncopal episode that happened about 2 to 3 hours ago. Patient states she was just standing and then had a syncopal episode and was on the ground. Patient states she has no idea how long she was out. Patient states when she did come to she was unable to get up off of the floor for at least 2 to 3 hours. Patient states she called her niece who came and got her. Patient states she has no idea why she passed out. Patient has a temperature of 100.8. Patient also is on blood thinners, warfarin and did hit her head. Patient has a zoie on the side of her left cheek. Patient has pain on her mid to lower back    ROS: Negative for cp, sob, abd pain, vomiting or diarrhea. PE: Gen Appearance/Constitutional: alert  HEENT: NC/NT. PERRLA,  Airway patent. Neck: supple     Initial Plan of Care: All treatment areas with department are currently occupied. Plan to order/Initiate the following while awaiting opening in ED: labs, EKG and imaging studies.   Initiate Treatment-Testing, Proceed toTreatment Area When Bed Available for ED Attending/MLP to Continue Care    Electronically signed by Dalene Bumpers, PA-C   DD: 5/26/22         Dalene Bumpers, PA-C  05/26/22 5421

## 2022-05-26 NOTE — LETTER
[address] Medicaid  CERTIFICATION OF NECESSITY  FOR TRANSPORTATION   BY WHEELCHAIR VAN     Individual Information   1. Name: Beckie Moore 2. [place] Medicaid Billing Number:    3. Address: [address] [place] [address]      Transportation Provider Information   4. Provider Name:    5. [place] Medicaid Provider Number:  National Provider Identifier (NPI):      Certification  7. Criteria:  By signing this document, the practitioner certifies that two statements are true:  A. This individual must be accompanied by a mobility-related assistive device from the point of pick-up to the point of drop-off. B. Transport of this individual by standard passenger vehicle or common carrier is precluded or contraindicated. 8. Period Beginning Date: 6/2/2022     9. Length  [x] Not more than 10 day(s)  [] One Year     Additional Information Relevant to Certification   10. Comments or Explanations, If Necessary or Appropriate   Syncope and collapse     Certifying Practitioner Information   11. Name of Practitioner: Dr Luz Maria Pedroza   12. [place] Medicaid Provider Number, If Applicable:  [address] Provider Identifier (NPI):      Signature Information   14. Date of Signature: 6/2/2022   15. Name of Person Signing: Electronically signed by Paola García RN on 6/2/2022 at 1:31 PM     16. Signature and Professional Designation: Dr Kuldip TIRADO/Electronically signed by Paola García RN on 6/2/2022 at 1:32 PM         OD 487[address]  Rev. 7/2015         [address] Encounter Date/Time: 5/26/2022 [address] [facility] Account: [de-identified]    MRN: [id number]    Patient: Beckie Moore    Contact Serial #: [id number]      ENCOUNTER          Patient Class: I Private Enc?   No Unit RM BDRandee Iha [address] Service: MED   Encounter DX: Syncope and collapse [R5*   ADM Provider:     Procedure:     ATT Provider: DO ASHLEY Prasad Provider:        Admission DX: Syncope and collapse, Fever of unknown origin and DX codes: W13, R50.9      PATIENT                 Name: Nathalie Jeans : 1945 (77 yrs)   Address: [address] [place] [address],[address], [address] Sex: Female   City: [facility] [address]         Marital Status:    Employer: RETIRED         Druze: Christianity   Primary Care Provider: Georgie Mederos MD         Primary Phone: [phone] [address]   Contact Name Legal Guardian? Relationship to Patient Home Phone Work Phone   1. Cristina Wheat  2. *No Contact Specified*      Niece/Nephew    [phone]                 GUARANTOR            Guarantor: Nathalie Jeans     : 1945   Address: [address] [place] [address],[address]; [address] Sex: Female     [place] [address]     Relation to Patient: Self       Home Phone: [phone]   Guarantor ID: [id number]       Work Phone:     Guarantor Employer: RETIRED         Status: RETIRED      COVERAGE        PRIMARY INSURANCE   Payor: [facility] MEDICARE Plan: [facility] MEDICARE COMPLETE   Payor Address: ,          Group Number: [id number] Insurance Type: [address] Name: Maria Luisa Adames : 1945   Subscriber ID: [id number] Pat. Rel. to Sub: Self   SECONDARY INSURANCE   Payor:   Plan:     Payor Address:  ,           Group Number:   Insurance Type:     Subscriber Name:   Subscriber :     Subscriber ID:   Pat.  Rel. to Sub:             CSN: [id number]

## 2022-05-27 PROBLEM — R79.89 ELEVATED TROPONIN: Status: ACTIVE | Noted: 2022-05-27

## 2022-05-27 PROBLEM — I50.9 CHRONIC CHF (CONGESTIVE HEART FAILURE) (HCC): Status: ACTIVE | Noted: 2022-05-27

## 2022-05-27 PROBLEM — R77.8 ELEVATED TROPONIN: Status: ACTIVE | Noted: 2022-05-27

## 2022-05-27 PROBLEM — R50.9 FEVER OF UNKNOWN ORIGIN: Status: ACTIVE | Noted: 2022-05-27

## 2022-05-27 PROBLEM — K44.9 HIATAL HERNIA: Status: ACTIVE | Noted: 2022-05-27

## 2022-05-27 PROBLEM — N94.9 ADNEXAL CYST: Status: ACTIVE | Noted: 2022-05-27

## 2022-05-27 PROBLEM — I25.10 CAD (CORONARY ARTERY DISEASE): Status: ACTIVE | Noted: 2022-05-27

## 2022-05-27 PROBLEM — A41.9 SEPSIS (HCC): Status: ACTIVE | Noted: 2022-05-27

## 2022-05-27 PROBLEM — D72.829 LEUKOCYTOSIS: Status: ACTIVE | Noted: 2022-05-27

## 2022-05-27 LAB
ALBUMIN SERPL-MCNC: 3.7 G/DL (ref 3.5–5.2)
ALP BLD-CCNC: 99 U/L (ref 35–104)
ALT SERPL-CCNC: 17 U/L (ref 0–32)
ANION GAP SERPL CALCULATED.3IONS-SCNC: 12 MMOL/L (ref 7–16)
AST SERPL-CCNC: 43 U/L (ref 0–31)
BASOPHILS ABSOLUTE: 0.04 E9/L (ref 0–0.2)
BASOPHILS RELATIVE PERCENT: 0.2 % (ref 0–2)
BILIRUB SERPL-MCNC: 2 MG/DL (ref 0–1.2)
BILIRUBIN DIRECT: 0.4 MG/DL (ref 0–0.3)
BILIRUBIN, INDIRECT: 1.6 MG/DL (ref 0–1)
BUN BLDV-MCNC: 18 MG/DL (ref 6–23)
CALCIUM SERPL-MCNC: 8.7 MG/DL (ref 8.6–10.2)
CHLORIDE BLD-SCNC: 103 MMOL/L (ref 98–107)
CO2: 26 MMOL/L (ref 22–29)
CREAT SERPL-MCNC: 0.9 MG/DL (ref 0.5–1)
EKG ATRIAL RATE: 110 BPM
EKG Q-T INTERVAL: 294 MS
EKG QRS DURATION: 120 MS
EKG QTC CALCULATION (BAZETT): 379 MS
EKG R AXIS: 100 DEGREES
EKG T AXIS: 173 DEGREES
EKG VENTRICULAR RATE: 100 BPM
EOSINOPHILS ABSOLUTE: 0.01 E9/L (ref 0.05–0.5)
EOSINOPHILS RELATIVE PERCENT: 0.1 % (ref 0–6)
GFR AFRICAN AMERICAN: >60
GFR NON-AFRICAN AMERICAN: >60 ML/MIN/1.73
GLUCOSE BLD-MCNC: 86 MG/DL (ref 74–99)
HCT VFR BLD CALC: 38.4 % (ref 34–48)
HEMOGLOBIN: 12.8 G/DL (ref 11.5–15.5)
IMMATURE GRANULOCYTES #: 0.09 E9/L
IMMATURE GRANULOCYTES %: 0.5 % (ref 0–5)
INR BLD: 2.7
LACTIC ACID, SEPSIS: 1.4 MMOL/L (ref 0.5–1.9)
LV EF: 53 %
LVEF MODALITY: NORMAL
LYMPHOCYTES ABSOLUTE: 3.26 E9/L (ref 1.5–4)
LYMPHOCYTES RELATIVE PERCENT: 19.2 % (ref 20–42)
MCH RBC QN AUTO: 30.8 PG (ref 26–35)
MCHC RBC AUTO-ENTMCNC: 33.3 % (ref 32–34.5)
MCV RBC AUTO: 92.5 FL (ref 80–99.9)
MONOCYTES ABSOLUTE: 0.74 E9/L (ref 0.1–0.95)
MONOCYTES RELATIVE PERCENT: 4.4 % (ref 2–12)
NEUTROPHILS ABSOLUTE: 12.84 E9/L (ref 1.8–7.3)
NEUTROPHILS RELATIVE PERCENT: 75.6 % (ref 43–80)
PDW BLD-RTO: 13.1 FL (ref 11.5–15)
PLATELET # BLD: 131 E9/L (ref 130–450)
PMV BLD AUTO: 8.7 FL (ref 7–12)
POTASSIUM REFLEX MAGNESIUM: 4.1 MMOL/L (ref 3.5–5)
PROTHROMBIN TIME: 29.1 SEC (ref 9.3–12.4)
RBC # BLD: 4.15 E12/L (ref 3.5–5.5)
SODIUM BLD-SCNC: 141 MMOL/L (ref 132–146)
TOTAL CK: 1685 U/L (ref 20–180)
TOTAL CK: 1805 U/L (ref 20–180)
TOTAL PROTEIN: 6.6 G/DL (ref 6.4–8.3)
TROPONIN, HIGH SENSITIVITY: 30 NG/L (ref 0–9)
TROPONIN, HIGH SENSITIVITY: 33 NG/L (ref 0–9)
WBC # BLD: 17 E9/L (ref 4.5–11.5)

## 2022-05-27 PROCEDURE — 36415 COLL VENOUS BLD VENIPUNCTURE: CPT

## 2022-05-27 PROCEDURE — 6360000002 HC RX W HCPCS: Performed by: INTERNAL MEDICINE

## 2022-05-27 PROCEDURE — 2580000003 HC RX 258: Performed by: NURSE PRACTITIONER

## 2022-05-27 PROCEDURE — 83605 ASSAY OF LACTIC ACID: CPT

## 2022-05-27 PROCEDURE — 85025 COMPLETE CBC W/AUTO DIFF WBC: CPT

## 2022-05-27 PROCEDURE — 80048 BASIC METABOLIC PNL TOTAL CA: CPT

## 2022-05-27 PROCEDURE — 85610 PROTHROMBIN TIME: CPT

## 2022-05-27 PROCEDURE — 2580000003 HC RX 258: Performed by: INTERNAL MEDICINE

## 2022-05-27 PROCEDURE — 2060000000 HC ICU INTERMEDIATE R&B

## 2022-05-27 PROCEDURE — 6370000000 HC RX 637 (ALT 250 FOR IP): Performed by: NURSE PRACTITIONER

## 2022-05-27 PROCEDURE — 99223 1ST HOSP IP/OBS HIGH 75: CPT | Performed by: INTERNAL MEDICINE

## 2022-05-27 PROCEDURE — APPSS180 APP SPLIT SHARED TIME > 60 MINUTES: Performed by: NURSE PRACTITIONER

## 2022-05-27 PROCEDURE — 80076 HEPATIC FUNCTION PANEL: CPT

## 2022-05-27 PROCEDURE — 84484 ASSAY OF TROPONIN QUANT: CPT

## 2022-05-27 PROCEDURE — 93306 TTE W/DOPPLER COMPLETE: CPT

## 2022-05-27 PROCEDURE — 87040 BLOOD CULTURE FOR BACTERIA: CPT

## 2022-05-27 PROCEDURE — 82550 ASSAY OF CK (CPK): CPT

## 2022-05-27 RX ORDER — ACETAMINOPHEN 325 MG/1
650 TABLET ORAL EVERY 6 HOURS PRN
Status: DISCONTINUED | OUTPATIENT
Start: 2022-05-27 | End: 2022-06-04 | Stop reason: HOSPADM

## 2022-05-27 RX ORDER — SODIUM CHLORIDE 9 MG/ML
INJECTION, SOLUTION INTRAVENOUS PRN
Status: DISCONTINUED | OUTPATIENT
Start: 2022-05-27 | End: 2022-06-02 | Stop reason: SDUPTHER

## 2022-05-27 RX ORDER — ONDANSETRON 2 MG/ML
4 INJECTION INTRAMUSCULAR; INTRAVENOUS EVERY 6 HOURS PRN
Status: DISCONTINUED | OUTPATIENT
Start: 2022-05-27 | End: 2022-06-04 | Stop reason: HOSPADM

## 2022-05-27 RX ORDER — SODIUM CHLORIDE 0.9 % (FLUSH) 0.9 %
5-40 SYRINGE (ML) INJECTION EVERY 12 HOURS SCHEDULED
Status: DISCONTINUED | OUTPATIENT
Start: 2022-05-27 | End: 2022-06-02 | Stop reason: SDUPTHER

## 2022-05-27 RX ORDER — PANTOPRAZOLE SODIUM 40 MG/1
40 TABLET, DELAYED RELEASE ORAL
Status: DISCONTINUED | OUTPATIENT
Start: 2022-05-27 | End: 2022-06-04 | Stop reason: HOSPADM

## 2022-05-27 RX ORDER — CARVEDILOL 6.25 MG/1
12.5 TABLET ORAL 2 TIMES DAILY WITH MEALS
Status: DISCONTINUED | OUTPATIENT
Start: 2022-05-27 | End: 2022-05-28

## 2022-05-27 RX ORDER — FUROSEMIDE 20 MG/1
20 TABLET ORAL DAILY
Status: DISCONTINUED | OUTPATIENT
Start: 2022-05-27 | End: 2022-06-04 | Stop reason: HOSPADM

## 2022-05-27 RX ORDER — ACETAMINOPHEN 650 MG/1
650 SUPPOSITORY RECTAL EVERY 6 HOURS PRN
Status: DISCONTINUED | OUTPATIENT
Start: 2022-05-27 | End: 2022-06-04 | Stop reason: HOSPADM

## 2022-05-27 RX ORDER — ATORVASTATIN CALCIUM 40 MG/1
40 TABLET, FILM COATED ORAL NIGHTLY
Status: DISCONTINUED | OUTPATIENT
Start: 2022-05-27 | End: 2022-06-04 | Stop reason: HOSPADM

## 2022-05-27 RX ORDER — ONDANSETRON 4 MG/1
4 TABLET, ORALLY DISINTEGRATING ORAL EVERY 8 HOURS PRN
Status: DISCONTINUED | OUTPATIENT
Start: 2022-05-27 | End: 2022-06-04 | Stop reason: HOSPADM

## 2022-05-27 RX ORDER — SODIUM CHLORIDE 0.9 % (FLUSH) 0.9 %
5-40 SYRINGE (ML) INJECTION PRN
Status: DISCONTINUED | OUTPATIENT
Start: 2022-05-27 | End: 2022-06-02 | Stop reason: SDUPTHER

## 2022-05-27 RX ORDER — SODIUM CHLORIDE 9 MG/ML
INJECTION, SOLUTION INTRAVENOUS CONTINUOUS
Status: DISCONTINUED | OUTPATIENT
Start: 2022-05-27 | End: 2022-06-04 | Stop reason: HOSPADM

## 2022-05-27 RX ORDER — WARFARIN SODIUM 2.5 MG/1
2.5 TABLET ORAL
Status: COMPLETED | OUTPATIENT
Start: 2022-05-27 | End: 2022-05-27

## 2022-05-27 RX ORDER — DOCUSATE SODIUM 100 MG/1
100 CAPSULE, LIQUID FILLED ORAL 2 TIMES DAILY
Status: DISCONTINUED | OUTPATIENT
Start: 2022-05-27 | End: 2022-06-04 | Stop reason: HOSPADM

## 2022-05-27 RX ORDER — ENOXAPARIN SODIUM 100 MG/ML
40 INJECTION SUBCUTANEOUS DAILY
Status: DISCONTINUED | OUTPATIENT
Start: 2022-05-27 | End: 2022-05-27 | Stop reason: ALTCHOICE

## 2022-05-27 RX ORDER — ACETAMINOPHEN 325 MG/1
650 TABLET ORAL EVERY 4 HOURS PRN
Status: DISCONTINUED | OUTPATIENT
Start: 2022-05-27 | End: 2022-06-04 | Stop reason: HOSPADM

## 2022-05-27 RX ORDER — LISINOPRIL 5 MG/1
5 TABLET ORAL DAILY
Status: DISCONTINUED | OUTPATIENT
Start: 2022-05-27 | End: 2022-06-04 | Stop reason: HOSPADM

## 2022-05-27 RX ORDER — SODIUM CHLORIDE 9 MG/ML
INJECTION, SOLUTION INTRAVENOUS CONTINUOUS
Status: DISCONTINUED | OUTPATIENT
Start: 2022-05-27 | End: 2022-05-27 | Stop reason: SDUPTHER

## 2022-05-27 RX ADMIN — Medication 10 ML: at 20:15

## 2022-05-27 RX ADMIN — Medication 1500 MG: at 16:53

## 2022-05-27 RX ADMIN — SODIUM CHLORIDE: 9 INJECTION, SOLUTION INTRAVENOUS at 23:19

## 2022-05-27 RX ADMIN — SODIUM CHLORIDE: 9 INJECTION, SOLUTION INTRAVENOUS at 04:00

## 2022-05-27 RX ADMIN — ATORVASTATIN CALCIUM 40 MG: 40 TABLET, FILM COATED ORAL at 20:15

## 2022-05-27 RX ADMIN — WARFARIN SODIUM 2.5 MG: 2.5 TABLET ORAL at 23:35

## 2022-05-27 ASSESSMENT — ENCOUNTER SYMPTOMS
SINUS PRESSURE: 0
SINUS PAIN: 0
ABDOMINAL PAIN: 0
CHEST TIGHTNESS: 0
NAUSEA: 0
EYE REDNESS: 0
BACK PAIN: 1
SHORTNESS OF BREATH: 0
VOMITING: 0
COUGH: 0
DIARRHEA: 0
CONSTIPATION: 0
EYE PAIN: 0

## 2022-05-27 ASSESSMENT — PAIN - FUNCTIONAL ASSESSMENT
PAIN_FUNCTIONAL_ASSESSMENT: NONE - DENIES PAIN
PAIN_FUNCTIONAL_ASSESSMENT: NONE - DENIES PAIN

## 2022-05-27 NOTE — CONSULTS
[facility] CONSULT NOTE  Reason for Consult:  fever  Requesting Physician:  Dr Dorotha Kussmaul   Patient presents with    Loss of Consciousness     , pt states she was on floor for about 2 hours then called daughter. +blood thinner, +hit head, complaining of back pain        History Obtained From:  Patient and chart     HISTORY OFPRESENT ILLNESS              The patient is a 69 y.o. female with significant past medical history of  who presents with below past med hx. She states she hit her head and passed out in her basement. She awakened and called her niece who called [phone].  tmax 101.2   Now afebrile      Past Medical History:   Diagnosis Date    Acute systolic congestive heart failure ([address] 4/24/2017    Atrial fibrillation (HCC)     CAD (coronary artery disease)     History of echocardiogram 04/14/2017    EF 26%    Hyperlipidemia 4/24/2017    Ischemic cardiomyopathy     Non-rheumatic tricuspid valve insufficiency     Nonrheumatic mitral (valve) insufficiency        Past Surgical History:   Procedure Laterality Date    CARDIAC CATHETERIZATION  04/17/2017    Dr. Janet Cabrera GRAFT  04/17/2017    DIAGNOSTIC CARDIAC CATH LAB PROCEDURE  04/17/2017    Dr. Lulu Magdaleno Facility-Administered Medications   Medication Dose Route Frequency Provider Last Rate Last Admin    sodium chloride flush 0.9 % injection 5-40 mL  5-40 mL IntraVENous 2 times per day April Camilla APRN - CNP        sodium chloride flush 0.9 % injection 5-40 mL  5-40 mL IntraVENous PRN April Camilla APRN - CNP        0.9 % sodium chloride infusion   IntraVENous PRN April Camilla APRN - ARTEM        ondansetron (ZOFRAN-ODT) disintegrating tablet 4 mg  4 mg Oral Q8H PRN April KEIRA Sampson - ARTEM        Or    ondansetron [facility]) injection 4 mg  4 mg IntraVENous Q6H PRN April Camilla APRN - ARTEM        acetaminophen (TYLENOL) tablet 650 mg  650 mg Oral Q6H PRN April KEIRA Sampson CNP        Or    acetaminophen (TYLENOL) suppository 650 mg  650 mg Rectal Q6H PRN April KEIRA Sampson CNP        bisacodyl (DULCOLAX) EC tablet 5 mg  5 mg Oral Daily PRN April KEIRA Sampson CNP        atorvastatin (LIPITOR) tablet 40 mg  40 mg Oral Nightly April KEIRA Sampson CNP        carvedilol (COREG) tablet 12.5 mg  12.5 mg Oral BID WC April KEIRA Sampson CNP        docusate sodium (COLACE) capsule 100 mg  100 mg Oral BID April KEIRA Sampson CNP        furosemide (LASIX) tablet 20 mg  20 mg Oral Daily April KEIRA Sampson CNP        lisinopril (PRINIVIL;ZESTRIL) tablet 5 mg  5 mg Oral Daily April KEIRA Sampson CNP        0.9 % sodium chloride infusion   IntraVENous Continuous April KEIRA Sampson CNP 50 mL/hr at 05/27/22 0400 New Bag at 05/27/22 0400    perflutren lipid microspheres (DEFINITY) injection 1.65 mg  1.5 mL IntraVENous ONCE PRN April KEIRA Sampson CNP        pantoprazole (PROTONIX) tablet 40 mg  40 mg Oral QAM AC Dante Patel DO        acetaminophen (TYLENOL) tablet 650 mg  650 mg Oral Q4H PRN Dante Patel DO        warfarin placeholder: dosing by pharmacy   Other RX Placeholder April KEIRA Sampson CNP        warfarin (COUMADIN) tablet 2.5 mg  2.5 mg Oral Once April KEIRA Sampson CNP         Current Outpatient Medications   Medication Sig Dispense Refill    lisinopril (PRINIVIL;ZESTRIL) 2.5 MG tablet TAKE 2 TABLETS BY MOUTH EVERY  tablet 3    warfarin (COUMADIN) 5 MG tablet TAKE 1 TABLET BY MOUTH EVERY DAY (Patient taking differently: Currently alt 2.5mg/5mg per PCP) 90 tablet 3    furosemide (LASIX) 20 MG tablet TAKE 1 TABLET BY MOUTH EVERY DAY 90 tablet 3    carvedilol (COREG) 12.5 MG tablet TAKE 1 TABLET BY MOUTH TWICE A DAY WITH MEALS 180 tablet 3    docusate sodium (COLACE) 100 MG capsule Take 100 mg by mouth 2 times daily      atorvastatin (LIPITOR) 40 MG tablet Take 1 tablet by mouth daily 30 tablet 3       No Known Allergies     Social History     Socioeconomic History    Marital status:      Spouse name: Not on file    Number of children: Not on file    Years of education: Not on file    Highest education level: Not on file   Occupational History    Not on file   Tobacco Use    Smoking status: Former Smoker     Packs/day: 0.50     Years: 20.00     Pack years: 10.00     Types: Cigarettes     Quit date: 2002     Years since quittin.1    Smokeless tobacco: Never Used   Vaping Use    Vaping Use: Never used   Substance and Sexual Activity    Alcohol use: Not Currently    Drug use: Never    Sexual activity: Never   Other Topics Concern    Not on file   Social History Narrative    Drinks 1 pot of coffee daily     Social Determinants of Health     Financial Resource Strain:     Difficulty of Paying Living Expenses: Not on file   Food Insecurity:     Worried About Running Out of Food in the Last Year: Not on file    Ami of Food in the Last Year: Not on file   Transportation Needs:     Lack of Transportation (Medical): Not on file    Lack of Transportation (Non-Medical):  Not on file   Physical Activity:     Days of Exercise per Week: Not on file    Minutes of Exercise per Session: Not on file   Stress:     Feeling of Stress : Not on file   Social Connections:     Frequency of Communication with Friends and Family: Not on file    Frequency of Social Gatherings with Friends and Family: Not on file    Attends Confucianist Services: Not on file    Active Member of Clubs or Organizations: Not on file    Attends Club or Organization Meetings: Not on file    Marital Status: Not on file   Intimate Partner Violence:     Fear of Current or Ex-Partner: Not on file    Emotionally Abused: Not on file    Physically Abused: Not on file    Sexually Abused: Not on file   Housing Stability:  Unable to Pay for Housing in the Last Year: Not on file    Number of Places Lived in the Last Year: Not on file    Unstable Housing in the Last Year: Not on file       Family History   Problem Relation Age of Onset    No Known Problems Mother     No Known Problems Father     No Known Problems Sister     Alcohol Abuse Brother        Review of Systems      Vitals:    05/27/22 0430 05/27/22 0500 05/27/22 0600 05/27/22 1003   BP: (!) 97/43 (!) 99/47 (!) 98/53 (!) 87/45   Pulse: 65 67 68 70   Resp: 19 13 17 18   Temp:   97.9 °F (36.6 °C) 97 °F (36.1 °C)   TempSrc:    Oral   SpO2: 94% 97% 95% 96%   Weight:       Height:           Physical Exam    Admission on 05/26/2022   Component Date Value Ref Range Status    WBC 05/26/2022 14.5* 4.5 - 11.5 E9/L Final    RBC 05/26/2022 4.77  3.50 - 5.50 E12/L Final    Hemoglobin 05/26/2022 14.6  11.5 - 15.5 g/dL Final    Hematocrit 05/26/2022 44.0  34.0 - 48.0 % Final    MCV 05/26/2022 92.2  80.0 - 99.9 fL Final    MCH 05/26/2022 30.6  26.0 - 35.0 pg Final    MCHC 05/26/2022 33.2  32.0 - 34.5 % Final    RDW 05/26/2022 13.0  11.5 - 15.0 fL Final    Platelets 73/96/3208 151  130 - 450 E9/L Final    MPV 05/26/2022 8.5  7.0 - 12.0 fL Final    Neutrophils % 05/26/2022 63.5  43.0 - 80.0 % Final    Immature Granulocytes % 05/26/2022 0.6  0.0 - 5.0 % Final    Lymphocytes % 05/26/2022 32.7  20.0 - 42.0 % Final    Monocytes % 05/26/2022 2.8  2.0 - 12.0 % Final    Eosinophils % 05/26/2022 0.1  0.0 - 6.0 % Final    Basophils % 05/26/2022 0.3  0.0 - 2.0 % Final    Neutrophils Absolute 05/26/2022 9.22* 1.80 - 7.30 E9/L Final    Immature Granulocytes # 05/26/2022 0.09  E9/L Final    Lymphocytes Absolute 05/26/2022 4.75* 1.50 - 4.00 E9/L Final    Monocytes Absolute 05/26/2022 0.41  0.10 - 0.95 E9/L Final    Eosinophils Absolute 05/26/2022 0.01* 0.05 - 0.50 E9/L Final    Basophils Absolute 05/26/2022 0.04  0.00 - 0.20 E9/L Final    Ventricular Rate 05/26/2022 100  BPM Final    Atrial Rate 05/26/2022 110  BPM Final    QRS Duration 05/26/2022 120  ms Final    Q-T Interval 05/26/2022 294  ms Final    QTc Calculation (Bazett) 05/26/2022 379  ms Final    R Axis 05/26/2022 100  degrees Final    T Axis 05/26/2022 173  degrees Final    Magnesium 05/26/2022 2.0  1.6 - 2.6 mg/dL Final    Protime 05/26/2022 31.8* 9.3 - 12.4 sec Final    INR 05/26/2022 2.8   Final    Sodium 05/26/2022 139  132 - 146 mmol/L Final    Potassium 05/26/2022 4.0  3.5 - 5.0 mmol/L Final    Chloride 05/26/2022 100  98 - 107 mmol/L Final    CO2 05/26/2022 26  22 - 29 mmol/L Final    Anion Gap 05/26/2022 13  7 - 16 mmol/L Final    Glucose 05/26/2022 122* 74 - 99 mg/dL Final    BUN 05/26/2022 20  6 - 23 mg/dL Final    CREATININE 05/26/2022 1.0  0.5 - 1.0 mg/dL Final    GFR Non- 05/26/2022 54  >=60 mL/min/1.73 Final    GFR  05/26/2022 >60   Final    Calcium 05/26/2022 9.2  8.6 - 10.2 mg/dL Final    Total Protein 05/26/2022 7.5  6.4 - 8.3 g/dL Final    Albumin 05/26/2022 4.4  3.5 - 5.2 g/dL Final    Total Bilirubin 05/26/2022 1.3* 0.0 - 1.2 mg/dL Final    Alkaline Phosphatase 05/26/2022 127* 35 - 104 U/L Final    ALT 05/26/2022 16  0 - 32 U/L Final    AST 05/26/2022 28  0 - 31 U/L Final    Troponin, High Sensitivity 05/26/2022 31* 0 - 9 ng/L Final    Total CK 05/26/2022 166  20 - 180 U/L Final    SARS-CoV-2, NAAT 05/26/2022 Not Detected  Not Detected Final    Pro-BNP 05/26/2022 2,115* 0 - 450 pg/mL Final    Lactic Acid, Sepsis 05/26/2022 1.3  0.5 - 1.9 mmol/L Final    Influenza A by PCR 05/26/2022 Not Detected  Not Detected Final    Influenza B by PCR 05/26/2022 Not Detected  Not Detected Final    Color, UA 05/26/2022 Yellow  Straw/Yellow Final    Clarity, UA 05/26/2022 Clear  Clear Final    Glucose, Ur 05/26/2022 Negative  Negative mg/dL Final    Bilirubin Urine 05/26/2022 Negative  Negative Final    Ketones, Urine 05/26/2022 Negative  Negative mg/dL Final    Specific Gravity, UA 05/26/2022 1.010  1.005 - 1.030 Final    Blood, Urine 05/26/2022 Negative  Negative Final    pH, UA 05/26/2022 5.5  5.0 - 9.0 Final    Protein, UA 05/26/2022 Negative  Negative mg/dL Final    Urobilinogen, Urine 05/26/2022 0.2  <2.0 E.U./dL Final    Nitrite, Urine 05/26/2022 Negative  Negative Final    Leukocyte Esterase, Urine 05/26/2022 Negative  Negative Final    WBC, UA 05/26/2022 1-3  0 - 5 /HPF Final    RBC, UA 05/26/2022 1-3  0 - 2 /HPF Final    Bacteria, UA 05/26/2022 RARE* None Seen /HPF Final    Rejected Test 05/26/2022 TRP5   Final    Reason for Rejection 05/26/2022 see below   Final    Troponin, High Sensitivity 05/26/2022 39* 0 - 9 ng/L Final    Sodium 05/27/2022 141  132 - 146 mmol/L Final    Potassium reflex Magnesium 05/27/2022 4.1  3.5 - 5.0 mmol/L Final    Chloride 05/27/2022 103  98 - 107 mmol/L Final    CO2 05/27/2022 26  22 - 29 mmol/L Final    Anion Gap 05/27/2022 12  7 - 16 mmol/L Final    Glucose 05/27/2022 86  74 - 99 mg/dL Final    BUN 05/27/2022 18  6 - 23 mg/dL Final    CREATININE 05/27/2022 0.9  0.5 - 1.0 mg/dL Final    GFR Non- 05/27/2022 >60  >=60 mL/min/1.73 Final    GFR  05/27/2022 >60   Final    Calcium 05/27/2022 8.7  8.6 - 10.2 mg/dL Final    WBC 05/27/2022 17.0* 4.5 - 11.5 E9/L Final    RBC 05/27/2022 4.15  3.50 - 5.50 E12/L Final    Hemoglobin 05/27/2022 12.8  11.5 - 15.5 g/dL Final    Hematocrit 05/27/2022 38.4  34.0 - 48.0 % Final    MCV 05/27/2022 92.5  80.0 - 99.9 fL Final    MCH 05/27/2022 30.8  26.0 - 35.0 pg Final    MCHC 05/27/2022 33.3  32.0 - 34.5 % Final    RDW 05/27/2022 13.1  11.5 - 15.0 fL Final    Platelets 97/26/8989 131  130 - 450 E9/L Final    MPV 05/27/2022 8.7  7.0 - 12.0 fL Final    Neutrophils % 05/27/2022 75.6  43.0 - 80.0 % Final    Immature Granulocytes % 05/27/2022 0.5  0.0 - 5.0 % Final    Lymphocytes % 05/27/2022 19.2* 20.0 - 42.0 % Final    Monocytes % 05/27/2022 4.4  2.0 - 12.0 % Final    Eosinophils % 05/27/2022 0.1  0.0 - 6.0 % Final    Basophils % 05/27/2022 0.2  0.0 - 2.0 % Final    Neutrophils Absolute 05/27/2022 12.84* 1.80 - 7.30 E9/L Final    Immature Granulocytes # 05/27/2022 0.09  E9/L Final    Lymphocytes Absolute 05/27/2022 3.26  1.50 - 4.00 E9/L Final    Monocytes Absolute 05/27/2022 0.74  0.10 - 0.95 E9/L Final    Eosinophils Absolute 05/27/2022 0.01* 0.05 - 0.50 E9/L Final    Basophils Absolute 05/27/2022 0.04  0.00 - 0.20 E9/L Final    Protime 05/27/2022 29.1* 9.3 - 12.4 sec Final    INR 05/27/2022 2.7   Final    Troponin, High Sensitivity 05/27/2022 30* 0 - 9 ng/L Final       ASSESSMENT:  Fall at home  Fever on may 26  Now afebrile  WBC 17,000  U/a  Neg  BC pending  Urine culture pending  Ct abd pelvis  Cholelithiasis  Degenerating fibroids  Right adnexal cyst  CT chest neg   CXR neg       PLAN:  Fever and leucocytosis  No obvious source  At this point cannot justify antibiotics  Will follow          Electronically signed by Adrien Astorga MD on 5/27/2022 at 11:21 AM

## 2022-05-27 NOTE — PROGRESS NOTES
Pharmacy Consultation Note  (Antibiotic Dosing and Monitoring)    Initial consult date: 5/27/22  Consulting physician/provider: Dr. Eusebio Conway  Drug: Vancomycin  Indication: Blood cultures + for GPC in clusters    Age/  Gender Height Weight IBW  Allergy Information   77 y.o./female 5' 2\" (157.5 cm) 160 lb (72.6 kg)     Ideal body weight: 50.1 kg (110 lb 7.2 oz)  Adjusted ideal body weight: 59.1 kg (130 lb 4.3 oz)   Patient has no known allergies. Renal Function:  Recent Labs     05/26/22  1546 05/27/22  0453   BUN 20 18   CREATININE 1.0 0.9     No intake or output data in the 24 hours ending 05/27/22 1531    Vancomycin Monitoring:  Trough:  No results for input(s): VANCOTROUGH in the last 72 hours. Random:  No results for input(s): VANCORANDOM in the last 72 hours. Vancomycin Administration Times:  Recent vancomycin administrations      No vancomycin IV orders with administrations found. Orders not given:          vancomycin 1500 mg in dextrose 5% 300 mL IVPB                Assessment:  · Patient is a 69 y.o. female who has been initiated on vancomycin  · Estimated Creatinine Clearance: 50 mL/min (based on SCr of 0.9 mg/dL). · To dose vancomycin, pharmacy will be utilizing Quick Key calculation software for goal AUC/XENIA 400-600 mg/L-hr    Plan:  · Start vancomycin 1500 mg Q24h.   Predicted AUC/XENIA of 525 and trough of 15.2  · Will check vancomycin levels when appropriate  · Will continue to monitor renal function       Arpita Metcalf, PharmD, [address] 5/27/2022 3:32 PM  Pharmacy Clinical Specialist, Emergency Medicine  [phone]

## 2022-05-27 NOTE — H&P
Hospital Medicine History & Physical      PCP: Brad Jean MD    Date of Admission: 5/26/2022    Date of Service: . mAY 27, 2022    Chief Complaint:   SYNCOPE. History Of Present Illness:     69 y.o. female presented with SYNCOPE. SHE STATES SHE HAD BEEN FINE, AND WENT TO BED AND GOT UP TO GO TO THE BATHROOM AND WHEN SHE WAS LEAVING THE BATHROOM SHE FAINTED, , FELL BACKWARDS, AND WAS ON THE FLOOR FOR ABOUT AND HOUR BEFORE SHE GOT UP AND WAS ABLE TO [facility] FOR HELP. HAD CHEST PAIN, ACING IN CHARACTER, LOCATED ACW, NO RADIATING, O NV, DO DIAPHORESIS, NO SOB. H/O CABG, SEES DR. RABAGO        Past Medical History:          Diagnosis Date    Acute systolic congestive heart failure ([address] 4/24/2017    Atrial fibrillation (HCC)     CAD (coronary artery disease)     History of echocardiogram 04/14/2017    EF 26%    Hyperlipidemia 4/24/2017    Ischemic cardiomyopathy     Non-rheumatic tricuspid valve insufficiency     Nonrheumatic mitral (valve) insufficiency        Past Surgical History:          Procedure Laterality Date    CARDIAC CATHETERIZATION  04/17/2017    Dr. Tico Benitez  04/17/2017    DIAGNOSTIC CARDIAC CATH LAB PROCEDURE  04/17/2017    Dr. Jeane Chandler       Medications Prior to Admission:      Prior to Admission medications    Medication Sig Start Date End Date Taking?  Authorizing Provider   lisinopril (PRINIVIL;ZESTRIL) 2.5 MG tablet TAKE 2 TABLETS BY MOUTH EVERY DAY 11/4/21   Darshan Vences MD   warfarin (COUMADIN) 5 MG tablet TAKE 1 TABLET BY MOUTH EVERY DAY  Patient taking differently: Currently alt 2.5mg/5mg per PCP 9/29/21   Darshan Vences MD   furosemide (LASIX) 20 MG tablet TAKE 1 TABLET BY MOUTH EVERY DAY 7/27/21   Darshan Vences MD   carvedilol (COREG) 12.5 MG tablet TAKE 1 TABLET BY MOUTH TWICE A DAY WITH MEALS 7/27/21   Darshan Vences MD docusate sodium (COLACE) 100 MG capsule Take 100 mg by mouth 2 times daily    Historical Provider, MD   atorvastatin (LIPITOR) 40 MG tablet Take 1 tablet by mouth daily 2/15/20   Loraine Luna MD       Allergies:  Patient has no known allergies. Social History:      The patient currently lives *ALONE    TOBACCO:   reports that she quit smoking about 20 years ago. Her smoking use included cigarettes. She has a 10.00 pack-year smoking history. She has never used smokeless tobacco.  ETOH:   reports previous alcohol use. Family History:      **Reviewed in detail and negative for DM, CAD, Cancer, CVA. Positive as follows:        Problem Relation Age of Onset    No Known Problems Mother     No Known Problems Father     No Known Problems Sister     Alcohol Abuse Brother        REVIEW OF SYSTEMS:   Pertinent positives as noted in the HPI. All other systems reviewed and negative. PHYSICAL EXAM:    BP (!) 87/45   Pulse 70   Temp 97 °F (36.1 °C) (Oral)   Resp 18   Ht 5' 2\" (1.575 m)   Wt 160 lb (72.6 kg)   SpO2 96%   BMI 29.26 kg/m²     General appearance:  No apparent distress, appears stated age and cooperative. HEENT:  Normal cephalic, atraumatic without obvious deformity. Pupils equal, round, and reactive to light. Extra ocular muscles intact. Conjunctivae/corneas clear. Neck: Supple, with full range of motion. No jugular venous distention. Trachea midline. Respiratory:  Normal respiratory effort. Clear to auscultation, bilaterally without Rales/Wheezes/Rhonchi. Cardiovascular:  IRREG  Abdomen: Soft, non-tender, non-distended with normal bowel sounds. Musculoskeletal:  No clubbing, cyanosis or edema bilaterally. Skin: Skin color, texture, turgor normal.  No rashes or lesions. Neurologic:  Neurovascularly intact without any focal sensory/motor deficits.  Cranial nerves: II-XII intact, grossly non-focal.  Psychiatric:  Alert and oriented, thought content appropriate, normal insight        Labs:     Recent Labs     05/26/22  1546 05/27/22  0453   WBC 14.5* 17.0*   HGB 14.6 12.8   HCT 44.0 38.4    131     Recent Labs     05/26/22  1546 05/27/22  0453    141   K 4.0 4.1    103   CO2 26 26   BUN 20 18   CREATININE 1.0 0.9   CALCIUM 9.2 8.7     Recent Labs     05/26/22  1546 05/27/22  0453   AST 28 43*   ALT 16 17   BILIDIR  --  0.4*   BILITOT 1.3* 2.0*   ALKPHOS 127* 99     Recent Labs     05/26/22  1546 05/27/22  0453   INR 2.8 2.7     Recent Labs     05/26/22  1546 05/27/22  0453 05/27/22  1305   CKTOTAL 166 1,685* 1,805*       Urinalysis:      Lab Results   Component Value Date    NITRU Negative 05/26/2022    WBCUA 1-3 05/26/2022    BACTERIA RARE 05/26/2022    RBCUA 1-3 05/26/2022    BLOODU Negative 05/26/2022    SPECGRAV 1.010 05/26/2022    GLUCOSEU Negative 05/26/2022       Radiology:     CXR: I have reviewed the CXR with the following interpretation:     CT HEAD WO CONTRAST   Final Result   CT HEAD WITHOUT CONTRAST:      1. No skull fracture or acute intracranial abnormality. CT CERVICAL SPINE WITHOUT CONTRAST:      1. No fracture or joint dislocation is seen. 2. Degenerative changes, as described. RECOMMENDATIONS:   Unavailable         CT CERVICAL SPINE WO CONTRAST   Final Result   CT HEAD WITHOUT CONTRAST:      1. No skull fracture or acute intracranial abnormality. CT CERVICAL SPINE WITHOUT CONTRAST:      1. No fracture or joint dislocation is seen. 2. Degenerative changes, as described. RECOMMENDATIONS:   Unavailable         CT CHEST W CONTRAST   Final Result   1. Large hiatal hernia   2. No pulmonary infiltrate or pleural effusion   3. Cardiomegaly and atherosclerotic disease      RECOMMENDATIONS:   Unavailable         CT ABDOMEN PELVIS W IV CONTRAST Additional Contrast? None   Final Result   1. Moderate to large hiatal hernia. 2. Cholelithiasis without evidence of acute cholecystitis.    3. Probable degenerating fibroids within the uterus. 4. Right adnexal cyst measures approximately 3.7 cm in size. This is a simple   appearing cyst. Recommend follow-up ultrasound in 6-12 months. XR CHEST 1 VIEW   Final Result   No acute process. ASSESSMENT:    Active Hospital Problems    Diagnosis Date Noted    Chronic CHF (congestive heart failure) ([facility] [address] [I50.9] 05/27/2022     Priority: Medium    Fever of unknown origin [R50.9] 05/27/2022     Priority: Medium    Leukocytosis [D72.829] 05/27/2022     Priority: Medium    Sepsis ([facility] [address] [A41.9] 05/27/2022     Priority: Medium    Hiatal hernia [K44.9] 05/27/2022     Priority: Medium    Adnexal cyst [N94.9] 05/27/2022     Priority: Medium    Elevated troponin [R77.8] 05/27/2022     Priority: Medium    CAD (coronary artery disease) [I25.10] 05/27/2022     Priority: Medium    Syncope and collapse [R55] 05/26/2022     Priority: Medium    Chronic atrial fibrillation ([facility] [address] [I48.20]     Hyperlipidemia [E78.5] 04/24/2017   RHABDOMYOLYSIS  ELEVATED TT  CHEST PAIN   H/O CABG   SYNCOPE COLLAPSE  ICM  PAF  LEUKOCYTOSIS   HYPERPYREXIA  TRANSAMINITIS   HLD    PLAN:  HYDRATION  NO ABX  COREG   LIPITOR  CARD CONSULT   ECHO   ZESTRIL      DVT Prophylaxis: *COUMADIN  Diet: ADULT DIET; Regular; Low Fat/Low Chol/High Fiber/2 gm Na; No Added Salt (3-4 gm); 1500 ml  Code Status: Full Code    PT/OT Eval Status: *ORDERED    Dispo - HOME    Electronically signed by Heidi Goetz DO on 5/27/2022 at 3:10 PM [place]       Thank you Nalini He MD for the opportunity to be involved in this patient's care.  If you have any questions or concerns please feel free to contact me at [phone]

## 2022-05-27 NOTE — CARE COORDINATION
WON transition of care. Pt presented to [facility] ER secondary to a fall and loss of consciousness. Pt admitted with syncope. Cardiology and ID on consult. WBC 17 today, spiked fever yesterday 101.2 afebrile today. LP done at bedside, blood cultures pending. Met with pt at bedside to discuss d/c planning. Pt reports she was coming out of her bathroom and fell and was passed out for at least an hour. Pt called her niece to come over when she came to. Pt lives alone in a basement apartment with 4 steps. Pt reports no DME and PTA is independent with ADLs and drives. Pt has hx with [facility] and has been to [facility] in the past.  Pt PCP is Dr Yaneli Burns and she fills Rx at [facility] in [facility]. Pt plans to d/c to home when medically stable. No needs identified. Pt niece will transport at d/c.  CM/SW will follow. Rhonda Cabrera RN CM.

## 2022-05-27 NOTE — ED NOTES
Hand grasp equal.  Pedal push and pull equal. Moves all ext. Without difficulty. Ambulates without difficulty.       Jacki Drew, RN  05/27/22 2348

## 2022-05-27 NOTE — PROGRESS NOTES
Pharmacy Consultation Note  (Warfarin Dosing and Monitoring)  Initial consult date: 5-  Consulting Provider: ALEXX Johnson Degree is a 69 y.o. female, 157.5 cm, 72.6 kg for whom pharmacy has been asked to manage warfarin therapy. TSH:    Lab Results   Component Value Date    TSH 4.630 02/14/2020       Hepatic Function Panel:                            Lab Results   Component Value Date    ALKPHOS 127 05/26/2022    ALT 16 05/26/2022    AST 28 05/26/2022    PROT 7.5 05/26/2022    BILITOT 1.3 05/26/2022    LABALBU 4.4 05/26/2022       Current warfarin drug-drug interactions include: No significant interactions    Recent Labs     05/26/22  1546 05/27/22  0453   HGB 14.6 12.8    131     Date Warfarin Dose INR Heparin or LMWH Comment   5/26 UTD 2.8 X    5/27 <2.5 mg> 2.7 stop enoxaparin order                           Assessment:  · 76 yo/F admitted from home d/t syncopal episode, fall (hit head), and LOC x~2 hr PTA. · On warfarin PTA for Hx of AF. Home dose = 2.5 mg once daily per PCP since Sep 2021 per Med Rec; Cards office visit note from Jan 2022 lists warfarin as 5 mg once daily. · Goal INR 2 - 3  · 5/27: INR = 2.7    Plan:  · Give warfarin 2.5 mg x1 tonight. · Stop order for enoxaparin; INR is therapeutic. · Daily PT/INR until the INR is stable within the therapeutic range. · Pharmacist will follow and monitor/adjust dosing as necessary.     Jad Willis, [address] [facility] 5/27/2022 11:07 AM

## 2022-05-27 NOTE — CONSULTS
consulted. Cardiology was consulted for management of syncope. Orthostatic BP ordered, not yet performed. Please note: past medical records were reviewed per electronic medical record (EMR) - see detailed reports under Past Medical/ Surgical History. Past Medical and Surgical History:    1. CAD s/p CABG in April 2017 with a LIMA to LAD, SVG to left PLV  2. TTE 04/14/2017 (Dr. Shanon Cook): LV size is grossly normal.  Mild concentric LVH. EF 26%. Inferior wall akinetic. No evidence of LV mass or thrombus. The left atrium is mildly dilated. Interatrial septum appears intact. No evidence of thrombus within the left atrium. Mild-moderate MR. No mitral valve stenosis. Physiologic and/or trace TR.  RVSP is 30.2 mmHg. Regular rhythm. 3. CHAN 04/19/2017 (Dr. Kimberlyn Fajardo): No previous echo for comparison. Technically adequate study. Moderate-severe MR. Mild TR.  4. Cardiomyopathy: Ischemic, She had a LifeVest in 2017, then outpatient echocardiogram revealed improvement of her EF  5. TTE 09/17/2019 (Dr. Kimberlyn Fajardo): Compared to prior echo, changes noted. Technically adequate study. Normal LV wall thickness. EF is visually estimated at 35-40%. Global wall hypokinesis. Indeterminate diastolic function. No evidence of LV mass or thrombus noted. Mild to moderate MR. Physiologic and/or trace AR. Moderate TR.  RVSP is 38 mmHg. Mild PHTN. Physiologic and/or trace VT. 6. Most recent TTE 02/14/2020 (Dr. Kimberlyn Fajardo): No previous echo for comparison. Technically adequate study. Normal LV wall thickness. EF is visually estimated at 35%. Abnormal (paradoxical) motion consistent with conduction abnormality. Mild to moderate MR. Moderate TR.  RVSP is normal  7. Chronic HFrEF  8. Persistent Atrial Fibrillation  9. Chronic anticoagulation with Coumadin   10. HTN  11. HLD  12. Remote tobacco abuse        Medications Prior to admit:  Prior to Admission medications    Medication Sig Start Date End Date Taking?  Authorizing Provider   lisinopril (PRINIVIL;ZESTRIL) 2.5 MG tablet TAKE 2 TABLETS BY MOUTH EVERY DAY 11/4/21   Dago Curry MD   warfarin (COUMADIN) 5 MG tablet TAKE 1 TABLET BY MOUTH EVERY DAY  Patient taking differently: Currently alt 2.5mg/5mg per PCP 9/29/21   Dago Curry MD   furosemide (LASIX) 20 MG tablet TAKE 1 TABLET BY MOUTH EVERY DAY 7/27/21   Dago Curry MD   carvedilol (COREG) 12.5 MG tablet TAKE 1 TABLET BY MOUTH TWICE A DAY WITH MEALS 7/27/21   Dago Curry MD   docusate sodium (COLACE) 100 MG capsule Take 100 mg by mouth 2 times daily    Historical Provider, MD   atorvastatin (LIPITOR) 40 MG tablet Take 1 tablet by mouth daily 2/15/20   Kael Carson MD       Current Medications:    Current Facility-Administered Medications: sodium chloride flush 0.9 % injection 5-40 mL, 5-40 mL, IntraVENous, 2 times per day  sodium chloride flush 0.9 % injection 5-40 mL, 5-40 mL, IntraVENous, PRN  0.9 % sodium chloride infusion, , IntraVENous, PRN  ondansetron (ZOFRAN-ODT) disintegrating tablet 4 mg, 4 mg, Oral, Q8H PRN **OR** ondansetron (ZOFRAN) injection 4 mg, 4 mg, IntraVENous, Q6H PRN  acetaminophen (TYLENOL) tablet 650 mg, 650 mg, Oral, Q6H PRN **OR** acetaminophen (TYLENOL) suppository 650 mg, 650 mg, Rectal, Q6H PRN  bisacodyl (DULCOLAX) EC tablet 5 mg, 5 mg, Oral, Daily PRN  atorvastatin (LIPITOR) tablet 40 mg, 40 mg, Oral, Nightly  carvedilol (COREG) tablet 12.5 mg, 12.5 mg, Oral, BID WC  docusate sodium (COLACE) capsule 100 mg, 100 mg, Oral, BID  furosemide (LASIX) tablet 20 mg, 20 mg, Oral, Daily  lisinopril (PRINIVIL;ZESTRIL) tablet 5 mg, 5 mg, Oral, Daily  0.9 % sodium chloride infusion, , IntraVENous, Continuous  perflutren lipid microspheres (DEFINITY) injection 1.65 mg, 1.5 mL, IntraVENous, ONCE PRN  pantoprazole (PROTONIX) tablet 40 mg, 40 mg, Oral, QAM AC  enoxaparin (LOVENOX) injection 40 mg, 40 mg, SubCUTAneous, Daily  acetaminophen (TYLENOL) tablet 650 mg, 650 mg, Oral, Q4H PRN    Allergies: Patient has no known allergies. Social History:    Quit smoking in her 25s prior to that smoked socially. Denies alcohol and illicit drug use. Caffeine intake includes almost a pot of coffee a day and occasional diet soda. Family History:   Please note this information was not obtained at this time, as it is limited in nature due to the patient's advanced age. REVIEW OF SYSTEMS:     · Constitutional: Denies fevers, chills, night sweats, and fatigue  · HEENT: Denies headaches, nose bleeds, and blurred vision,oral pain, abscess or lesion. · Musculoskeletal: Denies pain to BLE with ambulation and edema to BLE. Complains of fall with LOC-see HPI  · Neurological: Denies dizziness and lightheadedness, numbness and tingling  · Cardiovascular: Denies chest pain, palpitations, and feelings of heart racing. · Respiratory: Denies orthopnea and PND  · Gastrointestinal: Denies heartburn, nausea/vomiting, diarrhea and constipation, black/bloody, and tarry stools. · Genitourinary: Denies dysuria and hematuria  · Hematologic: Denies excessive bruising or bleeding  · Lymphatic: Denies lumps and bumps to neck, axilla, breast, and groin  · Endocrine: Denies excessive thirst. Denies intolerance to hot and cold  · GYN: Postmenopausal state; Denies vaginal bleeding. · Psychiatric: Denies anxiety and depression. PHYSICAL EXAM:   BP (!) 87/45   Pulse 70   Temp 97 °F (36.1 °C) (Oral)   Resp 18   Ht 5' 2\" (1.575 m)   Wt 160 lb (72.6 kg)   SpO2 96%   BMI 29.26 kg/m²   CONST:  Well developed, well nourished elderly  female who appears stated age. Awake, alert, cooperative, no apparent distress  HEENT:   Head- Normocephalic, atraumatic   Eyes- Conjunctivae pink, anicteric  Throat- Oral mucosa pink and moist  Neck-  No stridor, trachea midline, no jugular venous distention. No adenopathy   CHEST: Chest symmetrical and non-tender to palpation.  No accessory muscle use or intercostal retractions  RESPIRATORY: Lung sounds - clear throughout fields, diminished on the left  CARDIOVASCULAR:     No carotid bruit  Heart Inspection- shows no noted pulsations  Heart Palpation- no heaves or thrills; PMI is non-displaced   Heart Ausculation- Irregular rate and rhythm, no murmur. No s3, or rub   PV: Trace bilateral lower extremity edema. Noted varicosities to BLE. Pedal pulses palpable, no clubbing or cyanosis   ABDOMEN: Soft, non-tender to light palpation. Bowel sounds present. No palpable masses no organomegaly; no abdominal bruit  MS: Good muscle strength and tone. No atrophy or abnormal movements. : Deferred  SKIN: Warm and dry no statis dermatitis or ulcers   NEURO / PSYCH: Oriented to person, place and time. Speech clear and appropriate. Follows all commands. Pleasant affect     DATA:    ECG: As above  Tele strips: Atrial Fibrillation with HR in the 70s  Diagnostic:  Labs:   CBC:   Recent Labs     05/26/22  1546 05/27/22  0453   WBC 14.5* 17.0*   HGB 14.6 12.8   HCT 44.0 38.4    131     BMP:   Recent Labs     05/26/22  1546 05/27/22  0453    141   K 4.0 4.1   CO2 26 26   BUN 20 18   CREATININE 1.0 0.9   LABGLOM 54 >60   CALCIUM 9.2 8.7     Mag:   Recent Labs     05/26/22  1546   MG 2.0   HgA1c:   Lab Results   Component Value Date    LABA1C 5.7 04/15/2017   PT/INR:   Recent Labs     05/26/22  1546 05/27/22  0453   PROTIME 31.8* 29.1*   INR 2.8 2.7   CARDIAC ENZYMES:  Recent Labs     05/26/22  1546   CKTOTAL 166     FASTING LIPID PANEL:  Lab Results   Component Value Date    CHOL 108 06/27/2020    HDL 33 06/27/2020    LDLCALC 61 06/27/2020    TRIG 71 06/27/2020     LIVER PROFILE:  Recent Labs     05/26/22  1546   AST 28   ALT 16   LABALBU 4.4     Results for Shereen Funes (MRN [id number]) as of 5/27/2022 10:38   Ref.  Range 5/26/2022 15:46 5/26/2022 16:04 5/26/2022 21:13 5/27/2022 04:53   Troponin, High Sensitivity Latest Ref Range: 0 - 9 ng/L 31 (H)  39 (H) 30 (H)     05/26/2022 CXR:  No acute process. 05/26/2022 CT Head:  No skull fracture or acute intracranial abnormality. 05/26/2022 CT Cervical Spine:  1.  No fracture or joint dislocation is seen. 2. Degenerative changes, as described    05/26/2022 CT Chest:   1. Large hiatal hernia  2. No pulmonary infiltrate or pleural effusion  3. Cardiomegaly and atherosclerotic disease    05/26/2022 CT Abdomen and Pelvis:  1. Moderate to large hiatal hernia. 2. Cholelithiasis without evidence of acute cholecystitis. 3. Probable degenerating fibroids within the uterus. 4. Right adnexal cyst measures approximately 3.7 cm in size. This is a simple appearing cyst. Recommend follow-up ultrasound in 6-12 months. IMPRESSION:  1. Syncopal episode, questionable etiology. CT of the head unremarkable  2. Rhabdomyolysis with CK1 166--> 1685.  3. Febrile illness s/p lumbar puncture during ED course; ID on case  4. Known Hx CAD s/p CABG in 04/2014 (LIMA-LAD, SVG-PLV)  5. Mild to moderate MR, moderate TR with EF 35% per TTE 02/14/2020  6. Chronic HFrEF, appears compensated. 7. Persistent atrial fibrillation with CVR  8. Chronic anticoagulation with Coumadin with INR 2.7  9. Borderline hypotension, asymptomatic. Known Hx HTN  10. HLD, on statin  11. Remote tobacco abuse  12. Leukocytosis, increasing. Afebrile s/p Tylenol. 13. Elevated alk phos  14. Elevated bilirubin  15. Large hiatal hernia, cholelithiasis and degenerating fibroids and right adnexal cyst per CT of the abdomen and pelvis      PLAN:  1. Awaiting echocardiogram   2. Cycle CK, Troponin  3. Will discuss case with Dr. Bear Villegas    Electronically signed by KEIRA Chiu CNP on 5/27/2022 at 10:32 AM     [facility] Cardiology Consult       Lisa Phelps    I have personally participated in a face-to-face and personally obtained history and performed physical exam on the date of service. I reviewed chart, vitals, labs and radiologic studies.  I also participated in medical decision making with Demetri Nelson KEIRA Bradley CNP on the date of service All of the assessments and recommendations are from me and I agree with all of the pertinent clinical information, assessment and treatment plan. I have reviewed and edited the note above based on my findings during my history, exam, and decision making. Please see my additional contributions to the history, physical exam, assessment, and recommendations below. HISTORY OF PRESENT ILLNESS:   Reviewed, as above    Past medical history:  Reviewed, as above. Past surgical history:  Reviewed, as above. Current medications:  Reviewed, as above    Allergies:  Reviewed, as above    Social history:  Reviewed, as above    Family medical history:  Reviewed, as above. REVIEW OF SYSTEMS:   Reviewed, as above. PHYSICAL EXAM:   CONSTITUTIONAL:  awake, alert, cooperative, no apparent distress, and appears stated age  EYES:  lids and lashes normal and pupils equal, round and reactive to light, anicteric sclerae  HEAD:  normocepalic, without obvious abnormality, atraumatic, pink, moist mucous membranes. NECK:  Supple, symmetrical, trachea midline, no adenopathy, thyroid symmetric, not enlarged and no tenderness, skin normal  HEMATOLOGIC/LYMPHATICS:  no cervical lymphadenopathy and no supraclavicular lymphadenopathy  LUNGS:  No increased work of breathing, good air exchange, clear to auscultation bilaterally, no crackles or wheezing  CARDIOVASCULAR:  Normal apical impulse, irregularly irregular, normal S1 and S2, no S3, 3/6 systolic murmur at the left lower sternal border, no JVD, no carotid bruit, no pedal edema, good carotid upstroke bilaterally. ABDOMEN:  Soft, nontender, no masses, no hepatomegaly or splenomegaly, BS+  CHEST: nontender to palpation, expands symmetrically  MUSCULOSKELETAL:  No clubbing no cyanosis. there is no redness, warmth, or swelling of the joints  full range of motion noted  NEUROLOGIC:  Alert, awake.   SKIN:  no bruising or bleeding, normal skin color, texture, turgor and no redness, warmth, or swelling    BP (!) 150/78   Pulse 81   Temp 97.6 °F (36.4 °C) (Temporal)   Resp 16   Ht 5' 2\" (1.575 m)   Wt 166 lb 2 oz (75.4 kg)   SpO2 96%   BMI 30.38 kg/m²       No intake/output data recorded. I/O this shift:  In: 120 [P.O.:120]  Out: 0       DATA:   I personally reviewed the admission EKG with the following interpretation: Atrial fibrillation, controlled ventricular response, nonspecific intraventricular conduction delay, right axis deviation with nonspecific T wave changes    ECHO: 2/14/2020,Summary   No previous echo for comparison. Technically adequate study. Normal left ventricular wall thickness. Ejection fraction is visually estimated at 35%. Abnormal (paradoxical) motion consistent with conduction abnormality. Mild to moderate mitral regurgitation is present. Moderate tricuspid regurgitation.    RVSP is normal.  Stress Test:  Angiography:  Cardiology Labs:   BMP:    Lab Results   Component Value Date     05/27/2022    K 4.1 05/27/2022     05/27/2022    CO2 26 05/27/2022    BUN 18 05/27/2022     CMP:    Lab Results   Component Value Date     05/27/2022    K 4.1 05/27/2022     05/27/2022    CO2 26 05/27/2022    BUN 18 05/27/2022    PROT 6.6 05/27/2022     CBC:    Lab Results   Component Value Date    WBC 10.6 05/28/2022    RBC 3.87 05/28/2022    HGB 12.0 05/28/2022    HCT 35.7 05/28/2022    MCV 92.2 05/28/2022    RDW 13.2 05/28/2022     05/28/2022     PT/INR:  No results found for: PTINR  PT/INR Warfarin:  No components found for: PTPATWAR, PTINRWAR  PTT:    Lab Results   Component Value Date    APTT 25.9 04/20/2017     PTT Heparin:  No components found for: APTTHEP  Magnesium:    Lab Results   Component Value Date    MG 2.0 05/26/2022     TSH:    Lab Results   Component Value Date    TSH 4.630 02/14/2020     TROPONIN:  No components found for: TROP  BNP:    Lab Results   Component Value Date     04/14/2017 FASTING LIPID PANEL:    Lab Results   Component Value Date    CHOL 108 06/27/2020    HDL 33 06/27/2020    TRIG 71 06/27/2020     CT HEAD WO CONTRAST   Final Result   CT HEAD WITHOUT CONTRAST:      1. No skull fracture or acute intracranial abnormality. CT CERVICAL SPINE WITHOUT CONTRAST:      1. No fracture or joint dislocation is seen. 2. Degenerative changes, as described. RECOMMENDATIONS:   Unavailable         CT CERVICAL SPINE WO CONTRAST   Final Result   CT HEAD WITHOUT CONTRAST:      1. No skull fracture or acute intracranial abnormality. CT CERVICAL SPINE WITHOUT CONTRAST:      1. No fracture or joint dislocation is seen. 2. Degenerative changes, as described. RECOMMENDATIONS:   Unavailable         CT CHEST W CONTRAST   Final Result   1. Large hiatal hernia   2. No pulmonary infiltrate or pleural effusion   3. Cardiomegaly and atherosclerotic disease      RECOMMENDATIONS:   Unavailable         CT ABDOMEN PELVIS W IV CONTRAST Additional Contrast? None   Final Result   1. Moderate to large hiatal hernia. 2. Cholelithiasis without evidence of acute cholecystitis. 3. Probable degenerating fibroids within the uterus. 4. Right adnexal cyst measures approximately 3.7 cm in size. This is a simple   appearing cyst. Recommend follow-up ultrasound in 6-12 months. XR CHEST 1 VIEW   Final Result   No acute process. I have personally reviewed the laboratory, cardiac diagnostic and radiographic testing as outlined above:    IMPRESSION:  1. Syncope: Etiology?,  Suspect secondary to hypotension, will continue current treatment, follow telemetry  2. Hypotension: Etiology?,  Will hold beta-blocker and ACE inhibitor, continue IV hydration  3. Atrial fibrillation: Chronic, controlled ventricular response, under chronic anticoagulation with Coumadin  4. Chronic systolic congestive heart failure: Compensated  5.  CAD: S/p CABG in 2017 with a LIMA to LAD, SVG to left PLV, doing fine, continue current treatment  6. Ischemic cardiomyopathy  7. Tricuspid valvular regurgitation: Moderate  8. History of hypotension  9. Chronic anticoagulation    RECOMMENDATIONS:   1. Continue IV hydration  2. will hold beta-blocker and lisinopril for now  3. Telemetry  4. Echocardiogram  5. Further cardiac recommendations will be forthcoming pending her clinical course and diagnostic test findings    No family at bedside    Thank you for the consult  Electronically signed by Marlene Briones MD on 5/28/2022 at 6:24 AM  NOTE: This report was transcribed using voice recognition software.  Every effort was made to ensure accuracy; however, inadvertent computerized transcription errors may be present

## 2022-05-28 LAB
ANION GAP SERPL CALCULATED.3IONS-SCNC: 15 MMOL/L (ref 7–16)
BUN BLDV-MCNC: 22 MG/DL (ref 6–23)
CALCIUM SERPL-MCNC: 8.2 MG/DL (ref 8.6–10.2)
CHLORIDE BLD-SCNC: 104 MMOL/L (ref 98–107)
CO2: 18 MMOL/L (ref 22–29)
CREAT SERPL-MCNC: 0.8 MG/DL (ref 0.5–1)
GFR AFRICAN AMERICAN: >60
GFR NON-AFRICAN AMERICAN: >60 ML/MIN/1.73
GLUCOSE BLD-MCNC: 103 MG/DL (ref 74–99)
HCT VFR BLD CALC: 35.7 % (ref 34–48)
HEMOGLOBIN: 12 G/DL (ref 11.5–15.5)
INR BLD: 2.1
MCH RBC QN AUTO: 31 PG (ref 26–35)
MCHC RBC AUTO-ENTMCNC: 33.6 % (ref 32–34.5)
MCV RBC AUTO: 92.2 FL (ref 80–99.9)
PDW BLD-RTO: 13.2 FL (ref 11.5–15)
PLATELET # BLD: 109 E9/L (ref 130–450)
PMV BLD AUTO: 8.8 FL (ref 7–12)
POTASSIUM SERPL-SCNC: 3.6 MMOL/L (ref 3.5–5)
PROTHROMBIN TIME: 23.8 SEC (ref 9.3–12.4)
RBC # BLD: 3.87 E12/L (ref 3.5–5.5)
SODIUM BLD-SCNC: 137 MMOL/L (ref 132–146)
TOTAL CK: 1627 U/L (ref 20–180)
URINE CULTURE, ROUTINE: NORMAL
WBC # BLD: 10.6 E9/L (ref 4.5–11.5)

## 2022-05-28 PROCEDURE — 6360000002 HC RX W HCPCS: Performed by: INTERNAL MEDICINE

## 2022-05-28 PROCEDURE — 85610 PROTHROMBIN TIME: CPT

## 2022-05-28 PROCEDURE — 6370000000 HC RX 637 (ALT 250 FOR IP): Performed by: NURSE PRACTITIONER

## 2022-05-28 PROCEDURE — 6370000000 HC RX 637 (ALT 250 FOR IP)

## 2022-05-28 PROCEDURE — 80048 BASIC METABOLIC PNL TOTAL CA: CPT

## 2022-05-28 PROCEDURE — 2580000003 HC RX 258: Performed by: NURSE PRACTITIONER

## 2022-05-28 PROCEDURE — 2580000003 HC RX 258: Performed by: INTERNAL MEDICINE

## 2022-05-28 PROCEDURE — 97165 OT EVAL LOW COMPLEX 30 MIN: CPT

## 2022-05-28 PROCEDURE — 6370000000 HC RX 637 (ALT 250 FOR IP): Performed by: INTERNAL MEDICINE

## 2022-05-28 PROCEDURE — 99233 SBSQ HOSP IP/OBS HIGH 50: CPT | Performed by: INTERNAL MEDICINE

## 2022-05-28 PROCEDURE — 51798 US URINE CAPACITY MEASURE: CPT

## 2022-05-28 PROCEDURE — 85027 COMPLETE CBC AUTOMATED: CPT

## 2022-05-28 PROCEDURE — 36415 COLL VENOUS BLD VENIPUNCTURE: CPT

## 2022-05-28 PROCEDURE — 2060000000 HC ICU INTERMEDIATE R&B

## 2022-05-28 PROCEDURE — 82550 ASSAY OF CK (CPK): CPT

## 2022-05-28 RX ORDER — WARFARIN SODIUM 5 MG/1
5 TABLET ORAL
Status: COMPLETED | OUTPATIENT
Start: 2022-05-28 | End: 2022-05-28

## 2022-05-28 RX ORDER — METOPROLOL SUCCINATE 25 MG/1
25 TABLET, EXTENDED RELEASE ORAL DAILY
Status: DISCONTINUED | OUTPATIENT
Start: 2022-05-28 | End: 2022-06-03

## 2022-05-28 RX ADMIN — METOPROLOL SUCCINATE 25 MG: 25 TABLET, EXTENDED RELEASE ORAL at 16:19

## 2022-05-28 RX ADMIN — DOCUSATE SODIUM 100 MG: 100 CAPSULE, LIQUID FILLED ORAL at 21:29

## 2022-05-28 RX ADMIN — Medication 1500 MG: at 16:28

## 2022-05-28 RX ADMIN — WARFARIN SODIUM 5 MG: 5 TABLET ORAL at 17:50

## 2022-05-28 RX ADMIN — ATORVASTATIN CALCIUM 40 MG: 40 TABLET, FILM COATED ORAL at 21:29

## 2022-05-28 RX ADMIN — DOCUSATE SODIUM 100 MG: 100 CAPSULE, LIQUID FILLED ORAL at 08:03

## 2022-05-28 RX ADMIN — SODIUM CHLORIDE: 9 INJECTION, SOLUTION INTRAVENOUS at 16:28

## 2022-05-28 RX ADMIN — Medication 10 ML: at 21:45

## 2022-05-28 RX ADMIN — PANTOPRAZOLE SODIUM 40 MG: 40 TABLET, DELAYED RELEASE ORAL at 05:12

## 2022-05-28 ASSESSMENT — PAIN SCALES - GENERAL
PAINLEVEL_OUTOF10: 0

## 2022-05-28 NOTE — PROGRESS NOTES
OCCUPATIONAL THERAPY INITIAL EVALUATION    [address] [address] [place]      Date:2022                                                  Patient Name: Froy Slade  MRN: [id number]  : 1945  Room: [address] [place] [address]    Evaluating OT: Kelechi Juan Carlos, [address] [place], OTR/L [id number]  Referring Assunta Neighbor, DO  Specific Provider Orders: OT eval and treat   Recommended Adaptive Equipment: shower chair     Diagnosis: syncope and collapse, fever   Surgery: none   Pertinent Medical History: CAD, a-fib, HLD   Precautions:  Fall Risk, sitter    Assessment of current deficits   [x] Functional mobility  [x]ADLs  [x] Strength               [x]Cognition   [x] Functional transfers   [x] IADLs         [x] Safety Awareness   [x]Endurance   [] Fine Coordination              [x] Balance      [] Vision/perception   [x]Sensation    []Gross Motor Coordination  [] ROM  [] Delirium                   [] Motor Control     OT PLAN OF CARE   OT POC based on physician orders, patient diagnosis and results of clinical assessment    Frequency/Duration: 2-3 days/wk for 2 weeks PRN   Specific OT Treatment to include:   * Instruction/training on adapted ADL techniques and AE recommendations to increase functional independence within precautions       * Training on energy conservation strategies, correct breathing pattern and techniques to improve independence/tolerance for self-care routine  * Functional transfer/mobility training/DME recommendations for increased independence, safety, and fall prevention  * Patient/Family education to increase follow through with safety techniques and functional independence  * Recommendation of environmental modifications for increased safety with functional transfers/mobility and ADLs  * Therapeutic exercise to improve motor endurance, ROM, and functional strength for ADLs/functional transfers  * Therapeutic activities to facilitate/challenge dynamic balance, stand tolerance for increased safety and independence with ADLs  * Neuro-muscular re-education: facilitation of righting/equilibrium reactions, midline orientation, scapular stability/mobility, normalization of muscle tone, and facilitation of volitional active controled movement    Home Living: Pt lives alone in a 1 story home; bed/bath on main level   Bathroom setup: tub shower unit   Equipment owned: none  Prior Level of Function: IND with ADLs/IADLs; using no AD for functional mobility   Driving: yes    Pain Level: 0/10  Cognition: A&O: 3/4; Follows 1 step directions, with repetition and increased time   Memory:  fair    Sequencing:  fair    Problem solving:  fair    Judgement/safety:  fair     Functional Assessment:  AM-PAC Daily Activity Raw Score: 19/24   Initial Eval Status  Date: 5/28/22 Treatment Status  Date: STGs=LTGs  Time Frame: 10-14 days   Feeding IND (pt able to open packages and self feed)      Grooming SBA (standing at sink)   IND   UB Dressing SBA   IND   LB Dressing SBA (pt able to use crossing of leg technique to heaven B socks)  IND    Bathing SBA (simulated)  SUP    Toileting SBA  IND   Bed Mobility  Log roll: SBA  Supine to sit: SBA   Sit to supine: NT   Log roll IND  Supine to sit: IND   Sit to supine: IND   Functional Transfers Sit to stand:SBA   Stand to sit:SBA  Commode: SBA  IND   Functional Mobility SBA (using ww, to/from bathroom)  IND   Balance Sitting: SBA  Standing: SBA     Activity Tolerance fair     Visual/  Perceptual Glasses: yes              UE ROM: RUE:  WFL  LUE:  WFL  Strength: RUE: grossly 4-/5 LUE: grossly 4-/5   Strength: B WFL  Fine Motor Coordination:  WFL     Hearing: WFL  Sensation:  No c/o numbness/tingling   Tone:  WFL  Edema: none noted                            Comments:Cleared by RN to see pt. Upon arrival, patient supine in bed and agreeable to OT session.  At end of session, patient sitting in chair with call light and phone within reach, all lines and tubes intact. Pt would benefit from continued  OT to increase functional independence and quality of life. Treatment: Pt required vc's and physical assist for proper technique/safety with hand placement/body mechanics/posture for bed mobility/ADLs/functional tranfers/mobility/ww management. Pt required vc's for sequencing/initiation of ADLs/functional transfers. Pt able to  sit EOB ~5 mins to increase core strength/balance/activity tolerance for ease with ADLs. Pt required increased time to complete ADLs/functional transfers due to management of multiple lines. Pt appeared to have tolerated session well and appears motivated/cooperative/pleasant . Pt instructed on use of call light for assistance and fall prevention. Pt demo'ing fair understanding of education provided. Continue to educate. Eval Complexity: Low    Rehab Potential: Good for established goals, pt. assisted in establishment of goals. LTG: maximize independence with ADLs to return to PLOF    Patient instructed on diagnosis, prognosis/goals and plan of care. Demonstrated fair understanding. [] Malnutrition indicators have been identified and nursing has been notified to ensure a dietitian consult is ordered. Evaluation time includes thorough review of current medical information, gathering information on past medical & social history & PLOF, completion of standardized testing, informal observation of tasks, consultation with other medical professions/disciplines, assessment of data & development of POC/goals.      Time In: 1000       Time Out: 1010     Total treatment time: 0       Treatment Charges: Mins Units   OT Eval Low 53596 X    OT Eval Medium 27547     OT Eval High 26764     OT Re-Eval [id number]     Ther Ex  19302       Manual Therapy 08060       Thera Activities [id number]       ADL/Home Mgt [id number]     Neuro Re-ed 39572       Group Therapy        Orthotic manage/training  07566       Non-Billable Time           Leslee Elizalde, MOT, OTR/L

## 2022-05-28 NOTE — PROGRESS NOTES
Infectious Disease  Progress Note  [facility]    Chief Complaint: no complaint    Subjective: staph bacteremia    Scheduled Meds:   warfarin  5 mg Oral Once    sodium chloride flush  5-40 mL IntraVENous 2 times per day    atorvastatin  40 mg Oral Nightly    [Held by provider] carvedilol  12.5 mg Oral BID WC    docusate sodium  100 mg Oral BID    [Held by provider] furosemide  20 mg Oral Daily    [Held by provider] lisinopril  5 mg Oral Daily    pantoprazole  40 mg Oral QAM AC    warfarin placeholder: dosing by pharmacy   Other RX Placeholder    vancomycin  20 mg/kg IntraVENous Q24H     Continuous Infusions:   sodium chloride      sodium chloride 75 mL/hr at 05/27/22 2319     PRN Meds:sodium chloride flush, sodium chloride, ondansetron **OR** ondansetron, acetaminophen **OR** acetaminophen, bisacodyl, perflutren lipid microspheres, acetaminophen    Patient Vitals for the past 24 hrs:   BP Temp Temp src Pulse Resp SpO2 Weight   05/28/22 1127 (!) 141/65 97.3 °F (36.3 °C) Temporal 81 16 97 % --   05/28/22 0759 107/79 99 °F (37.2 °C) Oral 82 16 97 % --   05/28/22 0513 -- -- -- -- -- -- 166 lb 2 oz (75.4 kg)   05/28/22 0500 (!) 150/78 97.6 °F (36.4 °C) Temporal 81 16 96 % --   05/27/22 2335 (!) 150/77 98.4 °F (36.9 °C) -- 84 18 98 % --   05/27/22 1951 (!) 151/86 98.2 °F (36.8 °C) Oral 88 20 100 % --   05/27/22 1539 115/86 97.2 °F (36.2 °C) Oral 80 18 92 % --       CBC with Differential:    Lab Results   Component Value Date    WBC 10.6 05/28/2022    RBC 3.87 05/28/2022    HGB 12.0 05/28/2022    HCT 35.7 05/28/2022     05/28/2022    MCV 92.2 05/28/2022    MCH 31.0 05/28/2022    MCHC 33.6 05/28/2022    RDW 13.2 05/28/2022    LYMPHOPCT 19.2 05/27/2022    MONOPCT 4.4 05/27/2022    BASOPCT 0.2 05/27/2022    MONOSABS 0.74 05/27/2022    LYMPHSABS 3.26 05/27/2022    EOSABS 0.01 05/27/2022    BASOSABS 0.04 05/27/2022     CMP:    Lab Results   Component Value Date     05/28/2022    K 3.6 05/28/2022    K 4.1 05/27/2022     05/28/2022    CO2 18 05/28/2022    BUN 22 05/28/2022    CREATININE 0.8 05/28/2022    GFRAA >60 05/28/2022    LABGLOM >60 05/28/2022    GLUCOSE 103 05/28/2022    PROT 6.6 05/27/2022    LABALBU 3.7 05/27/2022    CALCIUM 8.2 05/28/2022    BILITOT 2.0 05/27/2022    ALKPHOS 99 05/27/2022    AST 43 05/27/2022    ALT 17 05/27/2022       BP (!) 141/65   Pulse 81   Temp 97.3 °F (36.3 °C) (Temporal)   Resp 16   Ht 5' 2\" (1.575 m)   Wt 166 lb 2 oz (75.4 kg)   SpO2 97%   BMI 30.38 kg/m²     Physical Exam  Const/Neuro- unchanged, no signs of acute distress, Alert  ENMT- Within Normal Limits, Normocephalic, mucous membranes pink/moist, No thrush  Neck: Neck supple  Heart- Regular, Rate, Rhythm- no murmur appreciated. Lungs- clear to ascultation. Respirations even and nonlabored. Abdomen- Soft, bowel sounds positive, non tender  Musculo/Extremities-  Equal and symmetrical, no edema. No tenderness. Skin:  Warm and dry, free from rashes. Cultures reviewed    Radiology reviewed  CT HEAD WO CONTRAST   Final Result   CT HEAD WITHOUT CONTRAST:      1. No skull fracture or acute intracranial abnormality. CT CERVICAL SPINE WITHOUT CONTRAST:      1. No fracture or joint dislocation is seen. 2. Degenerative changes, as described. RECOMMENDATIONS:   Unavailable         CT CERVICAL SPINE WO CONTRAST   Final Result   CT HEAD WITHOUT CONTRAST:      1. No skull fracture or acute intracranial abnormality. CT CERVICAL SPINE WITHOUT CONTRAST:      1. No fracture or joint dislocation is seen. 2. Degenerative changes, as described. RECOMMENDATIONS:   Unavailable         CT CHEST W CONTRAST   Final Result   1. Large hiatal hernia   2. No pulmonary infiltrate or pleural effusion   3. Cardiomegaly and atherosclerotic disease      RECOMMENDATIONS:   Unavailable         CT ABDOMEN PELVIS W IV CONTRAST Additional Contrast? None   Final Result   1. Moderate to large hiatal hernia.    2. Cholelithiasis without evidence of acute cholecystitis. 3. Probable degenerating fibroids within the uterus. 4. Right adnexal cyst measures approximately 3.7 cm in size. This is a simple   appearing cyst. Recommend follow-up ultrasound in 6-12 months. XR CHEST 1 VIEW   Final Result   No acute process.              Assessment  Fall at home   Fever on may 26 Now afebrile   WBC 17,000   U/a Neg   BC pending   Urine culture pending   Ct abd pelvis Cholelithiasis   Degenerating fibroids   Right adnexal cyst   CT chest neg   CXR neg       PlanFever and leucocytosis   Positive Bc   Staph species  Will need 2d echo etc  Iv vanco started   Will follow         Electronically signed by Lorena Carlisle MD on 5/28/2022 at 12:01 PM

## 2022-05-28 NOTE — PROGRESS NOTES
Pharmacy Consultation Note  (Antibiotic Dosing and Monitoring)    Initial consult date: 5/27/22  Consulting physician/provider: Dr. Chris Corrales  Drug: Vancomycin  Indication: Blood cultures + for GPC in clusters    Age/  Gender Height Weight IBW  Allergy Information   77 y.o./female 5' 2\" (157.5 cm) 160 lb (72.6 kg)     Ideal body weight: 50.1 kg (110 lb 7.2 oz)  Adjusted ideal body weight: 60.2 kg (132 lb 11.5 oz)   Patient has no known allergies. Renal Function:  Recent Labs     05/26/22  1546 05/27/22  0453 05/28/22  0600   BUN 20 18 22   CREATININE 1.0 0.9 0.8       Intake/Output Summary (Last 24 hours) at 5/28/2022 0930  Last data filed at 5/28/2022 0513  Gross per 24 hour   Intake 120 ml   Output 0 ml   Net 120 ml       Vancomycin Monitoring:  Trough:  No results for input(s): VANCOTROUGH in the last 72 hours. Random:  No results for input(s): VANCORANDOM in the last 72 hours. Vancomycin Administration Times:  Recent vancomycin administrations                   vancomycin 1500 mg in dextrose 5% 300 mL IVPB (mg) 1,500 mg New Bag 05/27/22 1653              Assessment:  · Patient is a 69 y.o. female who has been initiated on vancomycin  Estimated Creatinine Clearance: 57 mL/min (based on SCr of 0.8 mg/dL). · To dose vancomycin, pharmacy will be utilizing [employer] calculation software for goal AUC/XENIA 400-600 mg/L-hr    Plan:  · Continue vancomycin 1500 mg Q24h.   Predicted AUC/XENIA of 525 and trough 15.2  · Will check vancomycin levels when appropriate  · Will continue to monitor renal function       Ragini Goodson, PharmRONNI   Pharmacy Resident   Phone: [phone]  5/28/2022 9:31 AM

## 2022-05-28 NOTE — PROGRESS NOTES
[facility]   Progress note      Subjective: The patient is awake and alert. Able to answer some questions appropriately  Sitter is at bedside  Patient sitting up in chair    Objective:    BP (!) 141/65   Pulse 81   Temp 97.3 °F (36.3 °C) (Temporal)   Resp 16   Ht 5' 2\" (1.575 m)   Wt 166 lb (75.3 kg)   SpO2 97%   BMI 30.36 kg/m²     In: 600 [P.O.:600]  Out: 400   In: 600   Out: 400 [Urine:400]    General appearance: NAD, conversant  HEENT: AT/NC, MMM  Neck: FROM, supple  Lungs: Clear to auscultation  CV: RRR, no MRGs  Vasc: Radial pulses 2+  Abdomen: Soft, non-tender; no masses or HSM  Extremities: No peripheral edema or digital cyanosis  Skin: no rash, lesions or ulcers  Psych: Alert and oriented to person, place and time  Neuro: Alert and interactive     Recent Labs     05/26/22  1546 05/27/22  0453 05/28/22  0600   WBC 14.5* 17.0* 10.6   HGB 14.6 12.8 12.0   HCT 44.0 38.4 35.7    131 109*       Recent Labs     05/26/22  1546 05/27/22  0453 05/28/22  0600    141 137   K 4.0 4.1 3.6    103 104   CO2 26 26 18*   BUN 20 18 22   CREATININE 1.0 0.9 0.8   CALCIUM 9.2 8.7 8.2*       Assessment:    Principal Problem:    Syncope and collapse  Active Problems:    Chronic CHF (congestive heart failure) (HCC)    Fever of unknown origin    Leukocytosis    Sepsis (HCC)    Hiatal hernia    Adnexal cyst    Elevated troponin    CAD (coronary artery disease)    Hypotension    Chronic anticoagulation    Status post coronary artery bypass graft    Hyperlipidemia    Chronic systolic (congestive) heart failure (HCC)    Chronic atrial fibrillation ([address]  Resolved Problems:    * No resolved hospital problems.  *      Plan:  62-year-old  female admitted for questionable syncope    Cardiology following signed off today  Sitter at bedside secondary to marked confusion  Blood cultures positive for staph specieslikely contaminant as patient does not appear acutely ill, await final identification  Ongoing rhabdomyolysis with CK total of 1600  Continue aggressive IV fluids  Offending agents or exacerbating agents    DVT Prophylaxis   PT/OT  Discharge planning           Royal Louise MD  1:31 PM  5/28/2022

## 2022-05-28 NOTE — PROGRESS NOTES
Pharmacy Consultation Note  (Warfarin Dosing and Monitoring)  Initial consult date: 5-  Consulting Provider: EB Sampson, KEIRA-ARTEM    Alexei Lan is a 69 y.o. female, 157.5 cm, 72.6 kg for whom pharmacy has been asked to manage warfarin therapy. TSH:    Lab Results   Component Value Date    TSH 4.630 02/14/2020       Hepatic Function Panel:                            Lab Results   Component Value Date    ALKPHOS 99 05/27/2022    ALT 17 05/27/2022    AST 43 05/27/2022    PROT 6.6 05/27/2022    BILITOT 2.0 05/27/2022    BILIDIR 0.4 05/27/2022    IBILI 1.6 05/27/2022    LABALBU 3.7 05/27/2022       Current warfarin drug-drug interactions include: No significant interactions    Recent Labs     05/26/22  1546 05/27/22  0453 05/28/22  0600   HGB 14.6 12.8 12.0    131 109*     Date Warfarin Dose INR Heparin or LMWH Comment   5/26 UTD 2.8 X    5/27 2.5 mg 2.7 stop enoxaparin order    5/28 < 5 mg > 2.1                     Assessment:  · 78 yo/F admitted from home d/t syncopal episode, fall (hit head), and LOC x~2 hr PTA. · On warfarin PTA for Hx of AF. Home dose = 2.5 mg once daily per PCP since Sep 2021 per Med Rec; Cards office visit note from Jan 2022 lists warfarin as 5 mg once daily. · Goal INR 2 - 3  · INR 2.1 today    Plan:  · Give warfarin 2.5 mg x1 tonight. · Daily PT/INR until the INR is stable within the therapeutic range. · Pharmacist will follow and monitor/adjust dosing as necessary.     Nomi Quigley, Bina   Pharmacy Resident   Phone: [phone]  5/28/2022 9:27 AM

## 2022-05-28 NOTE — PROGRESS NOTES
Reason for follow up: Syncope. Subjective: Patient denies dizziness, syncope, chest pain or dyspnea. Objective:    No distress. No events overnight. Scheduled Meds:   warfarin  5 mg Oral Once    sodium chloride flush  5-40 mL IntraVENous 2 times per day    atorvastatin  40 mg Oral Nightly    [Held by provider] carvedilol  12.5 mg Oral BID WC    docusate sodium  100 mg Oral BID    [Held by provider] furosemide  20 mg Oral Daily    [Held by provider] lisinopril  5 mg Oral Daily    pantoprazole  40 mg Oral QAM AC    warfarin placeholder: dosing by pharmacy   Other RX Placeholder    vancomycin  20 mg/kg IntraVENous Q24H       Continuous Infusions:   sodium chloride      sodium chloride 75 mL/hr at 05/27/22 2319           Intake/Output Summary (Last 24 hours) at 5/28/2022 1312  Last data filed at 5/28/2022 1300  Gross per 24 hour   Intake 600 ml   Output 400 ml   Net 200 ml       Patient Vitals for the past 96 hrs (Last 3 readings):   Weight   05/28/22 0600 166 lb (75.3 kg)   05/28/22 0513 166 lb 2 oz (75.4 kg)   05/26/22 1526 160 lb (72.6 kg)          PE:   BP (!) 141/65   Pulse 81   Temp 97.3 °F (36.3 °C) (Temporal)   Resp 16   Ht 5' 2\" (1.575 m)   Wt 166 lb (75.3 kg)   SpO2 97%   BMI 30.36 kg/m²   CONST: Middle-age female laying in bed in no acute distress. Her mucous membranes are little dry. HEENT: Head- normocephalic, atraumatic. Neck:  no jugular venous distention. No carotid bruit noted. LUNGS: Clear. CARDIOVASCULAR: Irregular irregular but not tachycardic, no murmur, s3, s4 or rub noted. PV: No lower extremity edema. Pedal pulses palpable, no clubbing or cyanosis   ABDOMEN: Soft, non-tender to light palpation. Bowel sounds present. No palpable masses no hepatosplenomegaly or splenomegaly; no abdominal bruit / pulsation  SKIN: Warm and dry no statis dermatitis or ulcers. NEURO / PSYCH: Oriented to person, place and time.  Speech clear and appropriate. Follows all commands. Pleasant affect. Monitor: Atrial fibrillation with controlled ventricular response at 70-80 bpm.      Lab Review       Recent Labs     05/26/22  1546 05/27/22  0453 05/28/22  0600   WBC 14.5* 17.0* 10.6   HGB 14.6 12.8 12.0   HCT 44.0 38.4 35.7    131 109*       Recent Labs     05/26/22  1546 05/27/22  0453 05/28/22  0600    141 137   K 4.0 4.1 3.6    103 104   CO2 26 26 18*   BUN 20 18 22   CREATININE 1.0 0.9 0.8       Recent Labs     05/27/22  0453   AST 43*   ALT 17   BILIDIR 0.4*   ALKPHOS 99       Recent Labs     05/27/22  0453 05/27/22  1305 05/28/22  0600   CKTOTAL 1,685* 1,805* 1,627*       Last 3 Troponin:    Lab Results   Component Value Date    TROPONINI <0.01 06/26/2020    TROPONINI <0.01 06/26/2020    TROPONINI <0.01 06/26/2020         Recent Labs     05/26/22  1546 05/27/22  0453 05/28/22  0600   INR 2.8 2.7 2.1       Recent Labs     05/26/22  1604   PROBNP 2,115*             Assessment:  -Syncope: Probably due to dehydration, hypotension,  and febrile illness.  -Chronic atrial fibrillation with controlled ventricular response: On warfarin to prevent CVA. -Mild acute kidney injury:  recovered. -Rhabdomyolysis. -CAD, status post CABG in 2017. Stable with no angina. -Mild mitral stenosis with mild to moderate tricuspid regurgitation and moderate pulmonary hypertension.  -History of ischemic cardiomyopathy now with normal ejection fraction. Plan:  -Continue gentle hydration.  -Check orthostatic vital signs.  -Start Toprol 25 mg daily and titrate up if needed.  -Continue atorvastatin and warfarin.  -Continue holding lisinopril and Lasix for now. They can be resumed prior to hospital discharge.  -Cardiology will sign off. May call if needed. -May follow-up with Dr. Jerson Larson 4 weeks after hospital discharge. Electronically signed by Fox Ace MD on 5/28/2022 at 1:12 PM  [id number] [facility].

## 2022-05-28 NOTE — PROGRESS NOTES
5/28/2022  Blood cultures positive for staph species- staph hominis acutely ill, await final identification  Ongoing rhabdomyolysis with CK total of 1600  Discontinue offending agents or exacerbating agents     DVT Prophylaxis   PT/OT  Discharge Kathleen Sewell MD  9:42 AM  5/28/2022

## 2022-05-29 LAB
ANION GAP SERPL CALCULATED.3IONS-SCNC: 14 MMOL/L (ref 7–16)
BUN BLDV-MCNC: 11 MG/DL (ref 6–23)
CALCIUM SERPL-MCNC: 7.8 MG/DL (ref 8.6–10.2)
CHLORIDE BLD-SCNC: 109 MMOL/L (ref 98–107)
CO2: 17 MMOL/L (ref 22–29)
CREAT SERPL-MCNC: 0.7 MG/DL (ref 0.5–1)
GFR AFRICAN AMERICAN: >60
GFR NON-AFRICAN AMERICAN: >60 ML/MIN/1.73
GLUCOSE BLD-MCNC: 109 MG/DL (ref 74–99)
HCT VFR BLD CALC: 35 % (ref 34–48)
HEMOGLOBIN: 11.5 G/DL (ref 11.5–15.5)
INR BLD: 2.2
MCH RBC QN AUTO: 30.4 PG (ref 26–35)
MCHC RBC AUTO-ENTMCNC: 32.9 % (ref 32–34.5)
MCV RBC AUTO: 92.6 FL (ref 80–99.9)
PDW BLD-RTO: 13.2 FL (ref 11.5–15)
PLATELET # BLD: 107 E9/L (ref 130–450)
PMV BLD AUTO: 9.1 FL (ref 7–12)
POTASSIUM SERPL-SCNC: 3.6 MMOL/L (ref 3.5–5)
PROTHROMBIN TIME: 24.8 SEC (ref 9.3–12.4)
RBC # BLD: 3.78 E12/L (ref 3.5–5.5)
SODIUM BLD-SCNC: 140 MMOL/L (ref 132–146)
TOTAL CK: 1010 U/L (ref 20–180)
WBC # BLD: 8.6 E9/L (ref 4.5–11.5)

## 2022-05-29 PROCEDURE — 36415 COLL VENOUS BLD VENIPUNCTURE: CPT

## 2022-05-29 PROCEDURE — 80048 BASIC METABOLIC PNL TOTAL CA: CPT

## 2022-05-29 PROCEDURE — 6370000000 HC RX 637 (ALT 250 FOR IP): Performed by: INTERNAL MEDICINE

## 2022-05-29 PROCEDURE — 85610 PROTHROMBIN TIME: CPT

## 2022-05-29 PROCEDURE — 6360000002 HC RX W HCPCS: Performed by: INTERNAL MEDICINE

## 2022-05-29 PROCEDURE — 82550 ASSAY OF CK (CPK): CPT

## 2022-05-29 PROCEDURE — 97161 PT EVAL LOW COMPLEX 20 MIN: CPT

## 2022-05-29 PROCEDURE — 2580000003 HC RX 258: Performed by: NURSE PRACTITIONER

## 2022-05-29 PROCEDURE — 6370000000 HC RX 637 (ALT 250 FOR IP)

## 2022-05-29 PROCEDURE — 6370000000 HC RX 637 (ALT 250 FOR IP): Performed by: NURSE PRACTITIONER

## 2022-05-29 PROCEDURE — 2580000003 HC RX 258: Performed by: INTERNAL MEDICINE

## 2022-05-29 PROCEDURE — 2060000000 HC ICU INTERMEDIATE R&B

## 2022-05-29 PROCEDURE — 85027 COMPLETE CBC AUTOMATED: CPT

## 2022-05-29 RX ORDER — WARFARIN SODIUM 5 MG/1
5 TABLET ORAL
Status: COMPLETED | OUTPATIENT
Start: 2022-05-29 | End: 2022-05-29

## 2022-05-29 RX ADMIN — DOCUSATE SODIUM 100 MG: 100 CAPSULE, LIQUID FILLED ORAL at 08:39

## 2022-05-29 RX ADMIN — CEFAZOLIN 2000 MG: 2 INJECTION, POWDER, FOR SOLUTION INTRAMUSCULAR; INTRAVENOUS at 13:45

## 2022-05-29 RX ADMIN — ACETAMINOPHEN 325MG 650 MG: 325 TABLET ORAL at 21:16

## 2022-05-29 RX ADMIN — Medication 10 ML: at 21:11

## 2022-05-29 RX ADMIN — CEFAZOLIN 2000 MG: 2 INJECTION, POWDER, FOR SOLUTION INTRAMUSCULAR; INTRAVENOUS at 21:11

## 2022-05-29 RX ADMIN — DOCUSATE SODIUM 100 MG: 100 CAPSULE, LIQUID FILLED ORAL at 21:10

## 2022-05-29 RX ADMIN — ATORVASTATIN CALCIUM 40 MG: 40 TABLET, FILM COATED ORAL at 21:10

## 2022-05-29 RX ADMIN — PANTOPRAZOLE SODIUM 40 MG: 40 TABLET, DELAYED RELEASE ORAL at 06:50

## 2022-05-29 RX ADMIN — WARFARIN SODIUM 5 MG: 5 TABLET ORAL at 18:08

## 2022-05-29 RX ADMIN — METOPROLOL SUCCINATE 25 MG: 25 TABLET, EXTENDED RELEASE ORAL at 08:39

## 2022-05-29 ASSESSMENT — PAIN SCALES - GENERAL
PAINLEVEL_OUTOF10: 0
PAINLEVEL_OUTOF10: 7
PAINLEVEL_OUTOF10: 0

## 2022-05-29 ASSESSMENT — PAIN DESCRIPTION - DESCRIPTORS: DESCRIPTORS: ACHING

## 2022-05-29 ASSESSMENT — PAIN DESCRIPTION - LOCATION: LOCATION: GENERALIZED

## 2022-05-29 NOTE — PLAN OF CARE
Problem: Discharge Planning  Goal: Discharge to home or other facility with appropriate resources  Outcome: Progressing  Flowsheets (Taken 5/29/2022 0066)  Discharge to home or other facility with appropriate resources: Identify barriers to discharge with patient and caregiver     Problem: Safety - Adult  Goal: Free from fall injury  Outcome: Progressing

## 2022-05-29 NOTE — PROGRESS NOTES
Physical Therapy  Physical Therapy Initial Assessment     Name: Ziyad Nolan  : 1945  MRN: [id number]      Date of Service: 2022    Evaluating PT:  Easton Giron PT, DPT    Room #:  0243/1059-B  Diagnosis:  Syncope and collapse [R55]  Fever of unknown origin [R50.9]  PMHx/PSHx:  CAD, afib, HLD  Procedure/Surgery:  N/A  Precautions:  Fall risk, TSM  Equipment Needs:  None anticipated    SUBJECTIVE:    Pt lives alone in a basement level apt with 4 steps to enter and 1 handrail. Pt ambulated with no AD PTA. OBJECTIVE:   Initial Evaluation  Date: 22 Treatment Short Term/ Long Term   Goals   AM-PAC 6 Clicks      Was pt agreeable to Eval/treatment? yes     Does pt have pain? No pain     Bed Mobility  Rolling: NT  Supine to sit: NT  Sit to supine: NT  Scooting: SBA  Rolling: IND  Supine to sit: IND  Sit to supine: IND  Scooting: IND   Transfers Sit to stand: SBA  Stand to sit: SBA  Stand pivot: SBA with no AD  Sit to stand: IND  Stand to sit: IND  Stand pivot: IND with no AD   Ambulation    200' with no AD SBA  300'+ with no AD IND   Stair negotiation: ascended and descended  NT, pt declined need to attempt this date  4 steps with 1 handrail mod I     Strength/ROM:   BLE grossly 4/5  BLE AROM WFL    Balance:   Static Sitting: Supervision  Dynamic Sitting: SBA  Static Standing: Supervision with no AD  Dynamic Standing: SBA with no AD    Pt is A & O x 3  Sensation:  Pt denies numbness and tingling to extremities  Edema:  unremarkable    Therapeutic Exercises:    Transfers:  STSx1, cued for hand placement and postural correction  Ambulation: 200' with no AD  BLE AROM    Patient education  Pt educated on role of PT, importance of functional mobility during hospital stay, safety with functional mobility    Patient response to education:   Pt verbalized understanding Pt demonstrated skill Pt requires further education in this area   yes yes reinforce     ASSESSMENT:    Conditions Requiring Skilled Therapeutic Intervention:    [x]Decreased strength     []Decreased ROM  [x]Decreased functional mobility  []Decreased balance   []Decreased endurance   []Decreased posture  []Decreased sensation  []Decreased coordination   []Decreased vision  [x]Decreased safety awareness   []Increased pain       Comments:    Pt sitting at EOB upon entering, agreeable to participate. Pt instructed to transfer from EOB. Pt completing transfer with no physical assistance. Pt demonstrating good static standing balance with no AD, no c/o dizziness with position change. Pt instructed to ambulate to tolerance. Pt demonstrating good balance and fair paul with no c/o fatigue or SOB during bout. Pt returned to EOB at the end of session, all needs met and call bell in reach prior to exiting. Pt's/ family goals   1. Return home    Prognosis is good for reaching above PT goals. Patient and or family understand(s) diagnosis, prognosis, and plan of care.   yes    PHYSICAL THERAPY PLAN OF CARE:    PT POC is established based on physician order and patient diagnosis     Referring provider/PT Order:  Javier Hudson DO  Diagnosis:  Syncope and collapse [R55]  Fever of unknown origin [R50.9]  Specific instructions for next treatment:  Progress as tolerated    Current Treatment Recommendations:     [x] Strengthening to improve independence with functional mobility   [] ROM to improve independence with functional mobility   [x] Balance Training to improve static/dynamic balance and to reduce fall risk  [x] Endurance Training to improve activity tolerance during functional mobility   [x] Transfer Training to improve safety and independence with all functional transfers   [x] Gait Training to improve gait mechanics, endurance and assess need for appropriate assistive device  [x] Stair Training in preparation for safe discharge home and/or into the community   [] Positioning to prevent skin breakdown and contractures  [x] Safety and Education Training   [x] Patient/Caregiver Education   [] HEP  [] Other     PT long term treatment goals are located in above grid    Frequency of treatments: 2-5x/week x 1-2 weeks. Time in  1130  Time out  1140    Total Treatment Time  0 minutes     Evaluation Time includes thorough review of current medical information, gathering information on past medical history/social history and prior level of function, completion of standardized testing/informal observation of tasks, assessment of data and education on plan of care and goals.     CPT codes:  [x] Low Complexity PT evaluation 62823  [] Moderate Complexity PT evaluation 52815  [] High Complexity PT evaluation 94978  [] PT Re-evaluation 01863  [] Gait training 19994 -- minutes  [] Manual therapy [id number] -- minutes  [] Therapeutic activities 55046 -- minutes  [] Therapeutic exercises 49791 -- minutes  [] Neuromuscular reeducation 35747 -- minutes     Kip Garnica, PT, DPT  [id number]

## 2022-05-29 NOTE — PROGRESS NOTES
[facility]   Progress note      Subjective: The patient is awake and alert. Sitting up in chair no apparent acute distress  Objective:    /71   Pulse 72   Temp 97.6 °F (36.4 °C) (Temporal)   Resp 16   Ht 5' 2\" (1.575 m)   Wt 175 lb 0.5 oz (79.4 kg)   SpO2 97%   BMI 32.01 kg/m²     In: 2385 [P.O.:1110; I.V.:1275]  Out: 1650   In: 2385   Out: 9469 [Urine:1650]    General appearance: NAD, conversant but somewhat confused  HEENT: AT/NC, MMM  Neck: FROM, supple  Lungs: Coarse breath sounds bilateral lung fields  CV: RRR, no MRGs  Vasc: Radial pulses 2+  Abdomen: Soft, non-tender; no masses or HSM  Extremities: No peripheral edema or digital cyanosis  Skin: no rash, lesions or ulcers  Confused on exam     Recent Labs     05/27/22  0453 05/28/22  0600 05/29/22  0545   WBC 17.0* 10.6 8.6   HGB 12.8 12.0 11.5   HCT 38.4 35.7 35.0    109* 107*       Recent Labs     05/27/22  0453 05/28/22  0600 05/29/22  0545    137 140   K 4.1 3.6 3.6    104 109*   CO2 26 18* 17*   BUN 18 22 11   CREATININE 0.9 0.8 0.7   CALCIUM 8.7 8.2* 7.8*       Assessment:    Principal Problem:    Syncope and collapse  Active Problems:    Chronic CHF (congestive heart failure) (HCC)    Fever of unknown origin    Leukocytosis    Sepsis (HCC)    Hiatal hernia    Adnexal cyst    Elevated troponin    CAD (coronary artery disease)    Hypotension    Chronic anticoagulation    Status post coronary artery bypass graft    Hyperlipidemia    Chronic systolic (congestive) heart failure (HCC)    Chronic atrial fibrillation (HCC)  Resolved Problems:    * No resolved hospital problems.  *      Plan:    52-year-old  woman admitted for evaluation of syncopal episode/lethargy, chest pain, rhabdomyolysis, positive blood cultures    IV fluid hydration aggressively-increase  IV antibiotic per infectious disease-WBC improved from 17,000-8.6  Monitor daily labs  Cardiology  signed off 5/28/2022  Blood cultures positive for staph species- staph hominis   Ongoing rhabdomyolysis with CK total improved to 1000 today  Continue aggressive IV fluids  Discontinue offending agents or exacerbating agents     DVT Prophylaxis   PT/OT  Discharge Elisabeth Lenz MD  12:25 PM  5/29/2022

## 2022-05-29 NOTE — PROGRESS NOTES
Pharmacy Consultation Note  (Warfarin Dosing and Monitoring)  Initial consult date: 5-  Consulting Provider: ALEXX Johnson Degree is a 69 y.o. female, 157.5 cm, 72.6 kg for whom pharmacy has been asked to manage warfarin therapy. TSH:    Lab Results   Component Value Date    TSH 4.630 02/14/2020       Hepatic Function Panel:                            Lab Results   Component Value Date    ALKPHOS 99 05/27/2022    ALT 17 05/27/2022    AST 43 05/27/2022    PROT 6.6 05/27/2022    BILITOT 2.0 05/27/2022    BILIDIR 0.4 05/27/2022    IBILI 1.6 05/27/2022    LABALBU 3.7 05/27/2022       Current warfarin drug-drug interactions include: No significant interactions    Recent Labs     05/27/22  0453 05/28/22  0600 05/29/22  0545   HGB 12.8 12.0 11.5    109* 107*     Date Warfarin Dose INR Heparin or LMWH Comment   5/26 UTD 2.8 X    5/27 2.5 mg 2.7 stop enoxaparin order    5/28  5 mg  2.1 X    5/29 < 5 mg > 2.2 X             Assessment:  · 76 yo/F admitted from home d/t syncopal episode, fall (hit head), and LOC x~2 hr PTA. · On warfarin PTA for Hx of AF. Home dose = 2.5 mg once daily per PCP since Sep 2021 per Med Rec; Cards office visit note from Jan 2022 lists warfarin as 5 mg once daily. · Goal INR 2 - 3  · INR 2.2 today    Plan:  · Give warfarin 5 mg x1 tonight. · Daily PT/INR until the INR is stable within the therapeutic range. · Pharmacist will follow and monitor/adjust dosing as necessary.     Demetris Paez PharmD   Pharmacy Resident   Phone: [phone]  5/29/2022 9:04 AM

## 2022-05-29 NOTE — PROGRESS NOTES
 Vancomycin has been discontinued by ID   Clinical Pharmacy to Caitlin Carrasco [facility] Physician to re-consult pharmacy if future dosing is needed    Thank you for the consult,    Christian Andrade PharmD   Pharmacy Resident   Phone: [phone]  5/29/2022 3:23 PM        Pharmacy Consultation Note  (Antibiotic Dosing and Monitoring)    Initial consult date: 5/27/22  Consulting physician/provider: Dr. Teri Caldwell  Drug: Vancomycin  Indication: Blood cultures + for GPC in clusters    Age/  Gender Height Weight IBW  Allergy Information   77 y.o./female 5' 2\" (157.5 cm) 160 lb (72.6 kg)     Ideal body weight: 50.1 kg (110 lb 7.2 oz)  Adjusted ideal body weight: 61.8 kg (136 lb 4.5 oz)   Patient has no known allergies. Renal Function:  Recent Labs     05/27/22  0453 05/28/22  0600 05/29/22  0545   BUN 18 22 11   CREATININE 0.9 0.8 0.7       Intake/Output Summary (Last 24 hours) at 5/29/2022 0909  Last data filed at 5/29/2022 0831  Gross per 24 hour   Intake 2385 ml   Output 1650 ml   Net 735 ml       Vancomycin Monitoring:  Trough:  No results for input(s): VANCOTROUGH in the last 72 hours. Random:  No results for input(s): VANCORANDOM in the last 72 hours. Vancomycin Administration Times:  Recent vancomycin administrations                   vancomycin 1500 mg in dextrose 5% 300 mL IVPB (mg) 1,500 mg New Bag 05/28/22 1628     1,500 mg New Bag 05/27/22 1653              Assessment:  · Patient is a 69 y.o. female who has been initiated on vancomycin  Estimated Creatinine Clearance: 67 mL/min (based on SCr of 0.7 mg/dL). · To dose vancomycin, pharmacy will be utilizing [employer] calculation software for goal AUC/XENIA 400-600 mg/L-hr     Plan:  · Continue vancomycin 1500 mg Q24h.   Predicted AUC/XENIA of 525 and trough 15.2  · Will check vancomycin level with AM labs 5/30  · Will continue to monitor renal function       Christian Andrade PharmD   Pharmacy Resident   Phone: [phone]  5/29/2022 9:09 AM

## 2022-05-29 NOTE — PROGRESS NOTES
Infectious Disease  Progress Note  [facility]    Chief Complaint: no complaint    Subjective: staph bacteremia    Scheduled Meds:   warfarin  5 mg Oral Once    metoprolol succinate  25 mg Oral Daily    sodium chloride flush  5-40 mL IntraVENous 2 times per day    atorvastatin  40 mg Oral Nightly    docusate sodium  100 mg Oral BID    [Held by provider] furosemide  20 mg Oral Daily    [Held by provider] lisinopril  5 mg Oral Daily    pantoprazole  40 mg Oral QAM AC    warfarin placeholder: dosing by pharmacy   Other RX Placeholder    vancomycin  20 mg/kg IntraVENous Q24H     Continuous Infusions:   sodium chloride      sodium chloride 75 mL/hr at 05/28/22 1628     PRN Meds:sodium chloride flush, sodium chloride, ondansetron **OR** ondansetron, acetaminophen **OR** acetaminophen, bisacodyl, perflutren lipid microspheres, acetaminophen    Patient Vitals for the past 24 hrs:   BP Temp Temp src Pulse Resp SpO2 Weight   05/29/22 1111 132/71 97.6 °F (36.4 °C) Temporal 72 16 97 % --   05/29/22 0756 133/78 98.4 °F (36.9 °C) Temporal 93 16 96 % --   05/29/22 0600 -- -- -- -- -- -- 175 lb 0.5 oz (79.4 kg)   05/29/22 0349 138/75 96.9 °F (36.1 °C) Temporal 86 18 98 % --   05/28/22 2125 132/88 97.2 °F (36.2 °C) Temporal 88 20 97 % --   05/28/22 1448 131/75 97.2 °F (36.2 °C) Temporal 80 16 96 % --       CBC with Differential:    Lab Results   Component Value Date    WBC 8.6 05/29/2022    RBC 3.78 05/29/2022    HGB 11.5 05/29/2022    HCT 35.0 05/29/2022     05/29/2022    MCV 92.6 05/29/2022    MCH 30.4 05/29/2022    MCHC 32.9 05/29/2022    RDW 13.2 05/29/2022    LYMPHOPCT 19.2 05/27/2022    MONOPCT 4.4 05/27/2022    BASOPCT 0.2 05/27/2022    MONOSABS 0.74 05/27/2022    LYMPHSABS 3.26 05/27/2022    EOSABS 0.01 05/27/2022    BASOSABS 0.04 05/27/2022     CMP:    Lab Results   Component Value Date     05/29/2022    K 3.6 05/29/2022    K 4.1 05/27/2022     05/29/2022    CO2 17 05/29/2022    BUN 11 05/29/2022 CREATININE 0.7 05/29/2022    GFRAA >60 05/29/2022    LABGLOM >60 05/29/2022    GLUCOSE 109 05/29/2022    PROT 6.6 05/27/2022    LABALBU 3.7 05/27/2022    CALCIUM 7.8 05/29/2022    BILITOT 2.0 05/27/2022    ALKPHOS 99 05/27/2022    AST 43 05/27/2022    ALT 17 05/27/2022       /71   Pulse 72   Temp 97.6 °F (36.4 °C) (Temporal)   Resp 16   Ht 5' 2\" (1.575 m)   Wt 175 lb 0.5 oz (79.4 kg)   SpO2 97%   BMI 32.01 kg/m²     Physical Exam  Const/Neuro- unchanged, no signs of acute distress, Alert  ENMT- Within Normal Limits, Normocephalic, mucous membranes pink/moist, No thrush  Neck: Neck supple  Heart- Regular, Rate, Rhythm- no murmur appreciated. Lungs- clear to ascultation. Respirations even and nonlabored. Abdomen- Soft, bowel sounds positive, non tender  Musculo/Extremities-  Equal and symmetrical, no edema. No tenderness. Skin:  Warm and dry, free from rashes. Cultures reviewed    Radiology reviewed  CT HEAD WO CONTRAST   Final Result   CT HEAD WITHOUT CONTRAST:      1. No skull fracture or acute intracranial abnormality. CT CERVICAL SPINE WITHOUT CONTRAST:      1. No fracture or joint dislocation is seen. 2. Degenerative changes, as described. RECOMMENDATIONS:   Unavailable         CT CERVICAL SPINE WO CONTRAST   Final Result   CT HEAD WITHOUT CONTRAST:      1. No skull fracture or acute intracranial abnormality. CT CERVICAL SPINE WITHOUT CONTRAST:      1. No fracture or joint dislocation is seen. 2. Degenerative changes, as described. RECOMMENDATIONS:   Unavailable         CT CHEST W CONTRAST   Final Result   1. Large hiatal hernia   2. No pulmonary infiltrate or pleural effusion   3. Cardiomegaly and atherosclerotic disease      RECOMMENDATIONS:   Unavailable         CT ABDOMEN PELVIS W IV CONTRAST Additional Contrast? None   Final Result   1. Moderate to large hiatal hernia. 2. Cholelithiasis without evidence of acute cholecystitis.    3. Probable degenerating fibroids within the uterus. 4. Right adnexal cyst measures approximately 3.7 cm in size. This is a simple   appearing cyst. Recommend follow-up ultrasound in 6-12 months. XR CHEST 1 VIEW   Final Result   No acute process.              Assessment  Fall at home   U/a Neg   Ct abd pelvis Cholelithiasis   Degenerating fibroids   Right adnexal cyst   CT chest neg   CXR neg   Meth suscept staph hominis  Spp homins  Change to ancef   2D echo no evidence of vegetations  Will need CHAN       PlanFever and leucocytosis   Positive Bc   Staph species  Will need 2d echo etc  Iv vanco started   Will follow             Electronically signed by Alvarez Barber MD on 5/29/2022 at 12:02 PM

## 2022-05-30 LAB
ANION GAP SERPL CALCULATED.3IONS-SCNC: 12 MMOL/L (ref 7–16)
BUN BLDV-MCNC: 10 MG/DL (ref 6–23)
CALCIUM SERPL-MCNC: 8.3 MG/DL (ref 8.6–10.2)
CHLORIDE BLD-SCNC: 105 MMOL/L (ref 98–107)
CO2: 21 MMOL/L (ref 22–29)
CREAT SERPL-MCNC: 0.7 MG/DL (ref 0.5–1)
GFR AFRICAN AMERICAN: >60
GFR NON-AFRICAN AMERICAN: >60 ML/MIN/1.73
GLUCOSE BLD-MCNC: 107 MG/DL (ref 74–99)
HCT VFR BLD CALC: 38.5 % (ref 34–48)
HEMOGLOBIN: 12.6 G/DL (ref 11.5–15.5)
INR BLD: 3.3
MCH RBC QN AUTO: 31 PG (ref 26–35)
MCHC RBC AUTO-ENTMCNC: 32.7 % (ref 32–34.5)
MCV RBC AUTO: 94.8 FL (ref 80–99.9)
PDW BLD-RTO: 13 FL (ref 11.5–15)
PLATELET # BLD: 121 E9/L (ref 130–450)
PMV BLD AUTO: 9.6 FL (ref 7–12)
POTASSIUM SERPL-SCNC: 3.7 MMOL/L (ref 3.5–5)
PROTHROMBIN TIME: 35.9 SEC (ref 9.3–12.4)
RBC # BLD: 4.06 E12/L (ref 3.5–5.5)
SODIUM BLD-SCNC: 138 MMOL/L (ref 132–146)
TOTAL CK: 652 U/L (ref 20–180)
VANCOMYCIN RANDOM: 4.6 MCG/ML (ref 5–40)
WBC # BLD: 9.7 E9/L (ref 4.5–11.5)

## 2022-05-30 PROCEDURE — 6370000000 HC RX 637 (ALT 250 FOR IP): Performed by: NURSE PRACTITIONER

## 2022-05-30 PROCEDURE — 80202 ASSAY OF VANCOMYCIN: CPT

## 2022-05-30 PROCEDURE — 2580000003 HC RX 258: Performed by: NURSE PRACTITIONER

## 2022-05-30 PROCEDURE — 6360000002 HC RX W HCPCS: Performed by: INTERNAL MEDICINE

## 2022-05-30 PROCEDURE — 82550 ASSAY OF CK (CPK): CPT

## 2022-05-30 PROCEDURE — 2580000003 HC RX 258: Performed by: INTERNAL MEDICINE

## 2022-05-30 PROCEDURE — 6370000000 HC RX 637 (ALT 250 FOR IP): Performed by: INTERNAL MEDICINE

## 2022-05-30 PROCEDURE — 85610 PROTHROMBIN TIME: CPT

## 2022-05-30 PROCEDURE — 85027 COMPLETE CBC AUTOMATED: CPT

## 2022-05-30 PROCEDURE — 36415 COLL VENOUS BLD VENIPUNCTURE: CPT

## 2022-05-30 PROCEDURE — 80048 BASIC METABOLIC PNL TOTAL CA: CPT

## 2022-05-30 PROCEDURE — 2060000000 HC ICU INTERMEDIATE R&B

## 2022-05-30 RX ADMIN — ATORVASTATIN CALCIUM 40 MG: 40 TABLET, FILM COATED ORAL at 21:00

## 2022-05-30 RX ADMIN — SODIUM CHLORIDE: 9 INJECTION, SOLUTION INTRAVENOUS at 20:59

## 2022-05-30 RX ADMIN — DOCUSATE SODIUM 100 MG: 100 CAPSULE, LIQUID FILLED ORAL at 21:00

## 2022-05-30 RX ADMIN — CEFAZOLIN 2000 MG: 2 INJECTION, POWDER, FOR SOLUTION INTRAMUSCULAR; INTRAVENOUS at 21:03

## 2022-05-30 RX ADMIN — PANTOPRAZOLE SODIUM 40 MG: 40 TABLET, DELAYED RELEASE ORAL at 08:50

## 2022-05-30 RX ADMIN — CEFAZOLIN 2000 MG: 2 INJECTION, POWDER, FOR SOLUTION INTRAMUSCULAR; INTRAVENOUS at 05:49

## 2022-05-30 RX ADMIN — Medication 10 ML: at 21:08

## 2022-05-30 RX ADMIN — CEFAZOLIN 2000 MG: 2 INJECTION, POWDER, FOR SOLUTION INTRAMUSCULAR; INTRAVENOUS at 13:02

## 2022-05-30 RX ADMIN — DOCUSATE SODIUM 100 MG: 100 CAPSULE, LIQUID FILLED ORAL at 08:50

## 2022-05-30 RX ADMIN — Medication 10 ML: at 08:59

## 2022-05-30 ASSESSMENT — PAIN SCALES - GENERAL
PAINLEVEL_OUTOF10: 0

## 2022-05-30 NOTE — PROGRESS NOTES
Infectious Disease  Progress Note  [facility]    Chief Complaint: no complaint    Subjective: staph bacteremia  Up in chair. Sitter at bedside. No nausea or diarrhea.  Did not eat much breakfast   Scheduled Meds:   ceFAZolin  2,000 mg IntraVENous Q8H    metoprolol succinate  25 mg Oral Daily    sodium chloride flush  5-40 mL IntraVENous 2 times per day    atorvastatin  40 mg Oral Nightly    docusate sodium  100 mg Oral BID    [Held by provider] furosemide  20 mg Oral Daily    [Held by provider] lisinopril  5 mg Oral Daily    pantoprazole  40 mg Oral QAM AC    warfarin placeholder: dosing by pharmacy   Other RX Placeholder     Continuous Infusions:   sodium chloride      sodium chloride 75 mL/hr at 05/28/22 1628     PRN Meds:sodium chloride flush, sodium chloride, ondansetron **OR** ondansetron, acetaminophen **OR** acetaminophen, bisacodyl, perflutren lipid microspheres, acetaminophen    Patient Vitals for the past 24 hrs:   BP Temp Temp src Pulse Resp SpO2   05/30/22 0430 118/75 97.4 °F (36.3 °C) Temporal 67 18 96 %   05/30/22 0000 122/68 97.5 °F (36.4 °C) Temporal 67 20 97 %   05/29/22 2116 120/62 97.3 °F (36.3 °C) Temporal 75 20 96 %   05/29/22 1635 133/62 97.5 °F (36.4 °C) Temporal 80 16 95 %   05/29/22 1111 132/71 97.6 °F (36.4 °C) Temporal 72 16 97 %   05/29/22 0756 133/78 98.4 °F (36.9 °C) Temporal 93 16 96 %       CBC with Differential:    Lab Results   Component Value Date    WBC 9.7 05/30/2022    RBC 4.06 05/30/2022    HGB 12.6 05/30/2022    HCT 38.5 05/30/2022     05/30/2022    MCV 94.8 05/30/2022    MCH 31.0 05/30/2022    MCHC 32.7 05/30/2022    RDW 13.0 05/30/2022    LYMPHOPCT 19.2 05/27/2022    MONOPCT 4.4 05/27/2022    BASOPCT 0.2 05/27/2022    MONOSABS 0.74 05/27/2022    LYMPHSABS 3.26 05/27/2022    EOSABS 0.01 05/27/2022    BASOSABS 0.04 05/27/2022     CMP:    Lab Results   Component Value Date     05/29/2022    K 3.6 05/29/2022    K 4.1 05/27/2022     05/29/2022    CO2 17 05/29/2022    BUN 11 05/29/2022    CREATININE 0.7 05/29/2022    GFRAA >60 05/29/2022    LABGLOM >60 05/29/2022    GLUCOSE 109 05/29/2022    PROT 6.6 05/27/2022    LABALBU 3.7 05/27/2022    CALCIUM 7.8 05/29/2022    BILITOT 2.0 05/27/2022    ALKPHOS 99 05/27/2022    AST 43 05/27/2022    ALT 17 05/27/2022       /75   Pulse 67   Temp 97.4 °F (36.3 °C) (Temporal)   Resp 18   Ht 5' 2\" (1.575 m)   Wt 175 lb 0.5 oz (79.4 kg)   SpO2 96%   BMI 32.01 kg/m²     Physical Exam  Const/Neuro- unchanged, no signs of acute distress, Alert  ENMT- Within Normal Limits, Normocephalic, mucous membranes pink/moist, No thrush  Neck: Neck supple  Heart- Regular, Rate, Rhythm- no murmur appreciated. Lungs- clear to ascultation. Respirations even and nonlabored. Abdomen- Soft, bowel sounds positive, non tender  Musculo/Extremities-  Equal and symmetrical, no edema. No tenderness. Skin:  Warm and dry, free from rashes. Cultures reviewed    Radiology reviewed  CT HEAD WO CONTRAST   Final Result   CT HEAD WITHOUT CONTRAST:      1. No skull fracture or acute intracranial abnormality. CT CERVICAL SPINE WITHOUT CONTRAST:      1. No fracture or joint dislocation is seen. 2. Degenerative changes, as described. RECOMMENDATIONS:   Unavailable         CT CERVICAL SPINE WO CONTRAST   Final Result   CT HEAD WITHOUT CONTRAST:      1. No skull fracture or acute intracranial abnormality. CT CERVICAL SPINE WITHOUT CONTRAST:      1. No fracture or joint dislocation is seen. 2. Degenerative changes, as described. RECOMMENDATIONS:   Unavailable         CT CHEST W CONTRAST   Final Result   1. Large hiatal hernia   2. No pulmonary infiltrate or pleural effusion   3. Cardiomegaly and atherosclerotic disease      RECOMMENDATIONS:   Unavailable         CT ABDOMEN PELVIS W IV CONTRAST Additional Contrast? None   Final Result   1. Moderate to large hiatal hernia. 2. Cholelithiasis without evidence of acute cholecystitis. 3. Probable degenerating fibroids within the uterus. 4. Right adnexal cyst measures approximately 3.7 cm in size. This is a simple   appearing cyst. Recommend follow-up ultrasound in 6-12 months. XR CHEST 1 VIEW   Final Result   No acute process. Assessment  Fall at home   U/a Neg   Ct abd pelvis Cholelithiasis   Degenerating fibroids   Right adnexal cyst   CT chest neg   CXR neg   Meth suscept staph hominis  Spp homins  Change to ancef   2D echo no evidence of vegetations  Will need CHAN       Plan  Continue ancef  Fever and leucocytosis -resolved  Positive Bc   Staph species-hominis  Repeat blood cultures no growth to date  Will follow             Electronically signed by KEIRA Galvan CNP on 5/30/2022 at 7:41 AM   Pt seen and examined. Above discussed agree with advanced practice nurse. Labs, cultures, and radiographs reviewed. Face to Face encounter occurred. Changes made as necessary.      Myles Harvey MD

## 2022-05-30 NOTE — PROGRESS NOTES
P becoming increasingly agitated, anxious. Pt unable to retain being in the hospital. Pt wanting to go home. Pt refusing tele. Pt telesitter has been calling as Pt is getting dressed. Pt now has a sitter in the room. Pt has been walking around the hallway.

## 2022-05-30 NOTE — PLAN OF CARE
Problem: Discharge Planning  Goal: Discharge to home or other facility with appropriate resources  Outcome: Progressing  Flowsheets  Taken 5/30/2022 0800 by Jayashree Kiser RN  Discharge to home or other facility with appropriate resources: Identify barriers to discharge with patient and caregiver  Taken 5/30/2022 0430 by Shad Chavez RN  Discharge to home or other facility with appropriate resources:   Identify barriers to discharge with patient and caregiver   Identify discharge learning needs (meds, wound care, etc)     Problem: Safety - Adult  Goal: Free from fall injury  Outcome: Progressing  Flowsheets (Taken 5/30/2022 0800 by Jayashree Kiser RN)  Free From Fall Injury: Instruct family/caregiver on patient safety     Problem: Pain  Goal: Verbalizes/displays adequate comfort level or baseline comfort level  Outcome: Progressing  Flowsheets (Taken 5/30/2022 1200 by Jayashree Kiser RN)  Verbalizes/displays adequate comfort level or baseline comfort level: Encourage patient to monitor pain and request assistance

## 2022-05-30 NOTE — PROGRESS NOTES
[facility]   Progress note      Subjective:    Per nursing she is extremely confused trying to leave  Sitting up in chair no apparent acute distress    Objective:    BP (!) 174/73   Pulse 73   Temp 97.2 °F (36.2 °C) (Oral)   Resp 20   Ht 5' 2\" (1.575 m)   Wt 175 lb 0.5 oz (79.4 kg)   SpO2 97%   BMI 32.01 kg/m²     In: 1208 [P. O.:600; I.V.:608]  Out: -   In: 1980   Out: -     General appearance: NAD, conversant but somewhat confused  HEENT: AT/NC, MMM  Neck: FROM, supple  Lungs: Coarse breath sounds bilateral lung fields  CV: RRR, no MRGs  Vasc: Radial pulses 2+  Abdomen: Soft, non-tender; no masses or HSM  Extremities: No peripheral edema or digital cyanosis  Skin: no rash, lesions or ulcers  Confused on exam     Recent Labs     05/28/22  0600 05/29/22  0545 05/30/22  0620   WBC 10.6 8.6 9.7   HGB 12.0 11.5 12.6   HCT 35.7 35.0 38.5   * 107* 121*       Recent Labs     05/28/22  0600 05/29/22  0545 05/30/22  0620    140 138   K 3.6 3.6 3.7    109* 105   CO2 18* 17* 21*   BUN 22 11 10   CREATININE 0.8 0.7 0.7   CALCIUM 8.2* 7.8* 8.3*       Assessment:    Principal Problem:    Syncope and collapse  Active Problems:    Chronic CHF (congestive heart failure) (HCC)    Fever of unknown origin    Leukocytosis    Sepsis (HCC)    Hiatal hernia    Adnexal cyst    Elevated troponin    CAD (coronary artery disease)    Hypotension    Chronic anticoagulation    Status post coronary artery bypass graft    Hyperlipidemia    Chronic systolic (congestive) heart failure (HCC)    Chronic atrial fibrillation (HCC)  Resolved Problems:    * No resolved hospital problems.  *      Plan:    45-year-old  woman admitted for evaluation of syncopal episode/lethargy, chest pain, rhabdomyolysis, positive blood cultures    IV fluid hydration aggressively-increase  IV antibiotic per infectious disease-WBC improved from 17,000-8.6  Monitor daily labs  Cardiology  signed off 5/28/2022  Blood cultures positive for staph species- staph hominis   Ongoing rhabdomyolysis with CK total improved to 1000 yesterday-recheck today  Continue aggressive IV fluids  Discontinue offending agents or exacerbating agents     DVT Prophylaxis   PT/OT  Discharge Keri Sellers MD  12:24 PM  5/30/2022

## 2022-05-30 NOTE — PROGRESS NOTES
Pharmacy Consultation Note  (Warfarin Dosing and Monitoring)  Initial consult date: 5-  Consulting Provider: EB Sampson, KEIRA-ARTEM    Bobby Jarrell is a 69 y.o. female, 157.5 cm, 72.6 kg for whom pharmacy has been asked to manage warfarin therapy. TSH:    Lab Results   Component Value Date    TSH 4.630 02/14/2020       Hepatic Function Panel:                            Lab Results   Component Value Date    ALKPHOS 99 05/27/2022    ALT 17 05/27/2022    AST 43 05/27/2022    PROT 6.6 05/27/2022    BILITOT 2.0 05/27/2022    BILIDIR 0.4 05/27/2022    IBILI 1.6 05/27/2022    LABALBU 3.7 05/27/2022       Current warfarin drug-drug interactions include: No significant interactions    Recent Labs     05/28/22  0600 05/29/22  0545 05/30/22  0620   HGB 12.0 11.5 12.6   * 107* 121*     Date Warfarin Dose INR Heparin or LMWH Comment   5/26 UTD 2.8 X    5/27 2.5 mg 2.7 stop enoxaparin order    5/28  5 mg  2.1 X    5/29 5 mg  2.2 X    5/30 HOLD 3.3 X      Assessment:  · 78 yo/F admitted from home d/t syncopal episode, fall (hit head), and LOC x~2 hr PTA. · On warfarin PTA for Hx of AF. Home dose = 2.5 mg once daily per PCP since Sep 2021 per Med Rec; Cards office visit note from Jan 2022 lists warfarin as 5 mg once daily. · Goal INR 2 - 3  · INR 3.3 today    Plan:  · HOLD warfarin tonight  · Daily PT/INR until the INR is stable within the therapeutic range. · Pharmacist will follow and monitor/adjust dosing as necessary.     Tish SeeD, BCPS 5/30/2022 8:44 AM

## 2022-05-31 LAB
ANION GAP SERPL CALCULATED.3IONS-SCNC: 14 MMOL/L (ref 7–16)
BUN BLDV-MCNC: 8 MG/DL (ref 6–23)
CALCIUM SERPL-MCNC: 7.9 MG/DL (ref 8.6–10.2)
CHLORIDE BLD-SCNC: 106 MMOL/L (ref 98–107)
CO2: 17 MMOL/L (ref 22–29)
CREAT SERPL-MCNC: 0.6 MG/DL (ref 0.5–1)
CULTURE, BLOOD 2: ABNORMAL
GFR AFRICAN AMERICAN: >60
GFR NON-AFRICAN AMERICAN: >60 ML/MIN/1.73
GLUCOSE BLD-MCNC: 92 MG/DL (ref 74–99)
HCT VFR BLD CALC: 35.1 % (ref 34–48)
HEMOGLOBIN: 11.7 G/DL (ref 11.5–15.5)
INR BLD: 3.6
MCH RBC QN AUTO: 30.5 PG (ref 26–35)
MCHC RBC AUTO-ENTMCNC: 33.3 % (ref 32–34.5)
MCV RBC AUTO: 91.6 FL (ref 80–99.9)
ORGANISM: ABNORMAL
PDW BLD-RTO: 13.1 FL (ref 11.5–15)
PLATELET # BLD: 126 E9/L (ref 130–450)
PMV BLD AUTO: 9.3 FL (ref 7–12)
POTASSIUM SERPL-SCNC: 4 MMOL/L (ref 3.5–5)
PROTHROMBIN TIME: 40 SEC (ref 9.3–12.4)
RBC # BLD: 3.83 E12/L (ref 3.5–5.5)
SODIUM BLD-SCNC: 137 MMOL/L (ref 132–146)
TOTAL CK: 406 U/L (ref 20–180)
WBC # BLD: 9.9 E9/L (ref 4.5–11.5)

## 2022-05-31 PROCEDURE — 85610 PROTHROMBIN TIME: CPT

## 2022-05-31 PROCEDURE — 2580000003 HC RX 258: Performed by: INTERNAL MEDICINE

## 2022-05-31 PROCEDURE — 80048 BASIC METABOLIC PNL TOTAL CA: CPT

## 2022-05-31 PROCEDURE — 82550 ASSAY OF CK (CPK): CPT

## 2022-05-31 PROCEDURE — 6360000002 HC RX W HCPCS: Performed by: INTERNAL MEDICINE

## 2022-05-31 PROCEDURE — 2060000000 HC ICU INTERMEDIATE R&B

## 2022-05-31 PROCEDURE — 2580000003 HC RX 258: Performed by: NURSE PRACTITIONER

## 2022-05-31 PROCEDURE — 85027 COMPLETE CBC AUTOMATED: CPT

## 2022-05-31 PROCEDURE — 6370000000 HC RX 637 (ALT 250 FOR IP): Performed by: NURSE PRACTITIONER

## 2022-05-31 PROCEDURE — 36415 COLL VENOUS BLD VENIPUNCTURE: CPT

## 2022-05-31 PROCEDURE — 6370000000 HC RX 637 (ALT 250 FOR IP): Performed by: INTERNAL MEDICINE

## 2022-05-31 RX ADMIN — CEFAZOLIN 2000 MG: 2 INJECTION, POWDER, FOR SOLUTION INTRAMUSCULAR; INTRAVENOUS at 13:02

## 2022-05-31 RX ADMIN — SODIUM CHLORIDE: 9 INJECTION, SOLUTION INTRAVENOUS at 10:09

## 2022-05-31 RX ADMIN — Medication 10 ML: at 11:25

## 2022-05-31 RX ADMIN — DOCUSATE SODIUM 100 MG: 100 CAPSULE, LIQUID FILLED ORAL at 20:23

## 2022-05-31 RX ADMIN — METOPROLOL SUCCINATE 25 MG: 25 TABLET, EXTENDED RELEASE ORAL at 09:31

## 2022-05-31 RX ADMIN — CEFAZOLIN 2000 MG: 2 INJECTION, POWDER, FOR SOLUTION INTRAMUSCULAR; INTRAVENOUS at 20:23

## 2022-05-31 RX ADMIN — DOCUSATE SODIUM 100 MG: 100 CAPSULE, LIQUID FILLED ORAL at 09:11

## 2022-05-31 RX ADMIN — CEFAZOLIN 2000 MG: 2 INJECTION, POWDER, FOR SOLUTION INTRAMUSCULAR; INTRAVENOUS at 04:27

## 2022-05-31 RX ADMIN — Medication 10 ML: at 20:24

## 2022-05-31 RX ADMIN — ATORVASTATIN CALCIUM 40 MG: 40 TABLET, FILM COATED ORAL at 20:23

## 2022-05-31 ASSESSMENT — PAIN SCALES - GENERAL
PAINLEVEL_OUTOF10: 0

## 2022-05-31 NOTE — PROGRESS NOTES
Pharmacy Consultation Note  (Warfarin Dosing and Monitoring)  Initial consult date: 5-  Consulting Provider: EB Sampson, APRN-ARTEM    Coby Etienne is a 69 y.o. female, 157.5 cm, 72.6 kg for whom pharmacy has been asked to manage warfarin therapy. TSH:    Lab Results   Component Value Date    TSH 4.630 02/14/2020       Hepatic Function Panel:                            Lab Results   Component Value Date    ALKPHOS 99 05/27/2022    ALT 17 05/27/2022    AST 43 05/27/2022    PROT 6.6 05/27/2022    BILITOT 2.0 05/27/2022    BILIDIR 0.4 05/27/2022    IBILI 1.6 05/27/2022    LABALBU 3.7 05/27/2022       Current warfarin drug-drug interactions include: No significant interactions    Recent Labs     05/29/22  0545 05/30/22  0620 05/31/22  0833   HGB 11.5 12.6 11.7   * 121* 126*     Date Warfarin Dose INR Heparin or LMWH Comment   5/26 UTD 2.8 X    5/27 2.5 mg 2.7 stop enoxaparin order    5/28  5 mg  2.1 X    5/29 5 mg  2.2 X    5/30 HOLD 3.3 X    5/31 HOLD 3.6 X      Assessment:  · 76 yo/F admitted from home d/t syncopal episode, fall (hit head), and LOC x~2 hr PTA. · On warfarin PTA for Hx of AF. Home dose = 2.5 mg once daily per PCP since Sep 2021 per Med Rec; Cards office visit note from Jan 2022 lists warfarin as 5 mg once daily. · Goal INR 2 - 3  · INR 3.6 today    Plan:  · HOLD warfarin tonight  · Daily PT/INR until the INR is stable within the therapeutic range. · Pharmacist will follow and monitor/adjust dosing as necessary. Konstantin Jordan.  Amie Lilly, PharmRONNI 5/31/2022 7:27 AM

## 2022-05-31 NOTE — PROGRESS NOTES
Hospitalist Progress Note      PCP: Mylo Schwab, MD    Date of Admission: 5/26/2022        Hospital Course:  **69 y.o. female presented with SYNCOPE. SHE STATES SHE HAD BEEN FINE, AND WENT TO BED AND GOT UP TO GO TO THE BATHROOM AND WHEN SHE WAS LEAVING THE BATHROOM SHE FAINTED, , FELL BACKWARDS, AND WAS ON THE FLOOR FOR ABOUT AND HOUR BEFORE SHE GOT UP AND WAS ABLE TO [facility] FOR HELP. HAD CHEST PAIN, ACHING IN CHARACTER, LOCATED ACW, NO RADIATING, NO NV, NO DIAPHORESIS, NO SOB. FOUND TO HAVE STAPH HOMINIS  IN BLOOD CULTURE FOR CHAN   *        Subjective: **NO COMPLAINTS          Medications:  Reviewed    Infusion Medications    sodium chloride      sodium chloride 75 mL/hr at 05/31/22 1009     Scheduled Medications    ceFAZolin  2,000 mg IntraVENous Q8H    metoprolol succinate  25 mg Oral Daily    sodium chloride flush  5-40 mL IntraVENous 2 times per day    atorvastatin  40 mg Oral Nightly    docusate sodium  100 mg Oral BID    [Held by provider] furosemide  20 mg Oral Daily    [Held by provider] lisinopril  5 mg Oral Daily    pantoprazole  40 mg Oral QAM AC    warfarin placeholder: dosing by pharmacy   Other RX Placeholder     PRN Meds: sodium chloride flush, sodium chloride, ondansetron **OR** ondansetron, acetaminophen **OR** acetaminophen, bisacodyl, perflutren lipid microspheres, acetaminophen      Intake/Output Summary (Last 24 hours) at 5/31/2022 1524  Last data filed at 5/31/2022 1223  Gross per 24 hour   Intake 1199 ml   Output 100 ml   Net 1099 ml       Exam:    BP (!) 144/62   Pulse 66   Temp 97.5 °F (36.4 °C) (Temporal)   Resp 20   Ht 5' 2\" (1.575 m)   Wt 175 lb 0.5 oz (79.4 kg)   SpO2 99%   BMI 32.01 kg/m²     General appearance:  No apparent distress,   HEENT:  Normal cephalic, atraumatic without obvious deformity. Neck: Supple, with full range of motion. No jugular venous distention. Trachea midline. Respiratory:  Normal respiratory effort.  Clear to auscultation, bilaterally without Rales/Wheezes/Rhonchi. Cardiovascular:  IRREG  Abdomen: Soft, non-tender, non-distended with normal bowel sounds. Musculoskeletal:  No clubbing, cyanosis or edema bilaterally. Skin: Skin color, texture, turgor normal.  No rashes or lesions. Neurologic:  Neurovascularly intact wCranial nerves: II-XII intact,   Psychiatric:  Alert and oriented, thought content appropriate, normal insight                    Labs:   Recent Labs     05/29/22  0545 05/30/22  0620 05/31/22  0833   WBC 8.6 9.7 9.9   HGB 11.5 12.6 11.7   HCT 35.0 38.5 35.1   * 121* 126*     Recent Labs     05/29/22  0545 05/30/22  0620 05/31/22  0833    138 137   K 3.6 3.7 4.0   * 105 106   CO2 17* 21* 17*   BUN 11 10 8   CREATININE 0.7 0.7 0.6   CALCIUM 7.8* 8.3* 7.9*     No results for input(s): AST, ALT, BILIDIR, BILITOT, ALKPHOS in the last 72 hours. Recent Labs     05/29/22  0545 05/30/22  0620 05/31/22  0833   INR 2.2 3.3 3.6     Recent Labs     05/29/22  0545 05/30/22  1331 05/31/22  0833   CKTOTAL 1,010* 652* 406*     No results for input(s): AST, ALT, ALB, BILIDIR, BILITOT, ALKPHOS in the last 72 hours. No results for input(s): LACTA in the last 72 hours.   No results found for: Wilson Lanius  No results found for: AMMONIA    Assessment:    Active Hospital Problems    Diagnosis Date Noted    Hypotension [I95.9]      Priority: Medium    Chronic anticoagulation [Z79.01]      Priority: Medium    Chronic CHF (congestive heart failure) ([facility] [I50.9] 05/27/2022     Priority: Medium    Fever of unknown origin [R50.9] 05/27/2022     Priority: Medium    Leukocytosis [D72.829] 05/27/2022     Priority: Medium    Sepsis ([facility] [address] [A41.9] 05/27/2022     Priority: Medium    Hiatal hernia [K44.9] 05/27/2022     Priority: Medium    Adnexal cyst [N94.9] 05/27/2022     Priority: Medium    Elevated troponin [R77.8] 05/27/2022     Priority: Medium    CAD (coronary artery disease) [I25.10] 05/27/2022     Priority: Medium    Syncope and collapse [R55] 05/26/2022     Priority: Medium    Chronic systolic (congestive) heart failure (HCC) [I50.22] 06/26/2020    Chronic atrial fibrillation (HCC) [I48.20]     Hyperlipidemia [E78.5] 04/24/2017    Status post coronary artery bypass graft [Z95.1] 04/22/2017   RHABDOMYOLYSIS  ELEVATED TT  CHEST PAIN   H/O CABG   SYNCOPE COLLAPSE  ICM  PAF  LEUKOCYTOSIS   HYPERPYREXIA  TRANSAMINITIS   HLD   STAPH HOMINIS IN BLOOD CUTURE  PLAN:  HYDRATION  COREG   LIPITOR  CHAN   ZESTRIL        DVT Prophylaxis:  HOLD  Diet: ADULT DIET; Regular; Low Fat/Low Chol/High Fiber/2 gm Na;  No Added Salt (3-4 gm); 1500 ml  Code Status: Full Code     PT/OT Eval Status: *ORDERED     Dispo - HOME      Electronically signed by Christine Dawson DO on 5/31/2022 at 3:24 PM [facility]

## 2022-05-31 NOTE — CARE COORDINATION
Pt scheduled for CHAN today. PT/OT ordered. Discharge Plan is to return home  When medically stable. Niece to provide transport. SW/CM to follow for discharge needs.    Katheryn Sánchez, L.S.W.  [phone]

## 2022-05-31 NOTE — PLAN OF CARE
Patient's chart updated to reflect:      . - HF care plan, HF education points and HF discharge instructions.  -Orders: 2 gram sodium diet, daily weights, I/O.  -PCP and/or Cardiologist appointment to be scheduled within 7 days of hospital discharge.  -History of HFcompensated, not primary admission Dx. Patient admitted for treatment of Syncopal episode, questionable etiology.     Renee Olivo RN RN, BSN  Heart Failure Navigator

## 2022-06-01 ENCOUNTER — APPOINTMENT (OUTPATIENT)
Dept: CARDIAC CATH/INVASIVE PROCEDURES | Age: 77
DRG: 872 | End: 2022-06-01
Payer: MEDICARE

## 2022-06-01 ENCOUNTER — ANESTHESIA EVENT (OUTPATIENT)
Dept: CARDIAC CATH/INVASIVE PROCEDURES | Age: 77
DRG: 872 | End: 2022-06-01
Payer: MEDICARE

## 2022-06-01 ENCOUNTER — ANESTHESIA (OUTPATIENT)
Dept: CARDIAC CATH/INVASIVE PROCEDURES | Age: 77
DRG: 872 | End: 2022-06-01
Payer: MEDICARE

## 2022-06-01 LAB
ANION GAP SERPL CALCULATED.3IONS-SCNC: 15 MMOL/L (ref 7–16)
BLOOD CULTURE, ROUTINE: NORMAL
BUN BLDV-MCNC: 9 MG/DL (ref 6–23)
CALCIUM SERPL-MCNC: 8 MG/DL (ref 8.6–10.2)
CHLORIDE BLD-SCNC: 108 MMOL/L (ref 98–107)
CO2: 17 MMOL/L (ref 22–29)
CREAT SERPL-MCNC: 0.6 MG/DL (ref 0.5–1)
CULTURE, BLOOD 2: NORMAL
GFR AFRICAN AMERICAN: >60
GFR NON-AFRICAN AMERICAN: >60 ML/MIN/1.73
GLUCOSE BLD-MCNC: 119 MG/DL (ref 74–99)
HCT VFR BLD CALC: 35.7 % (ref 34–48)
HEMOGLOBIN: 12.1 G/DL (ref 11.5–15.5)
INR BLD: 2.6
MCH RBC QN AUTO: 30.9 PG (ref 26–35)
MCHC RBC AUTO-ENTMCNC: 33.9 % (ref 32–34.5)
MCV RBC AUTO: 91.1 FL (ref 80–99.9)
ORGANISM: ABNORMAL
PDW BLD-RTO: 13.1 FL (ref 11.5–15)
PLATELET # BLD: 151 E9/L (ref 130–450)
PMV BLD AUTO: 9.7 FL (ref 7–12)
POTASSIUM SERPL-SCNC: 3.6 MMOL/L (ref 3.5–5)
PROTHROMBIN TIME: 28.8 SEC (ref 9.3–12.4)
RBC # BLD: 3.92 E12/L (ref 3.5–5.5)
SODIUM BLD-SCNC: 140 MMOL/L (ref 132–146)
WBC # BLD: 11.9 E9/L (ref 4.5–11.5)

## 2022-06-01 PROCEDURE — 6370000000 HC RX 637 (ALT 250 FOR IP): Performed by: INTERNAL MEDICINE

## 2022-06-01 PROCEDURE — 93312 ECHO TRANSESOPHAGEAL: CPT

## 2022-06-01 PROCEDURE — 93325 DOPPLER ECHO COLOR FLOW MAPG: CPT

## 2022-06-01 PROCEDURE — 2580000003 HC RX 258: Performed by: INTERNAL MEDICINE

## 2022-06-01 PROCEDURE — 6360000002 HC RX W HCPCS: Performed by: NURSE ANESTHETIST, CERTIFIED REGISTERED

## 2022-06-01 PROCEDURE — 93325 DOPPLER ECHO COLOR FLOW MAPG: CPT | Performed by: INTERNAL MEDICINE

## 2022-06-01 PROCEDURE — 93321 DOPPLER ECHO F-UP/LMTD STD: CPT

## 2022-06-01 PROCEDURE — 6360000002 HC RX W HCPCS: Performed by: INTERNAL MEDICINE

## 2022-06-01 PROCEDURE — 6370000000 HC RX 637 (ALT 250 FOR IP): Performed by: NURSE PRACTITIONER

## 2022-06-01 PROCEDURE — 93312 ECHO TRANSESOPHAGEAL: CPT | Performed by: INTERNAL MEDICINE

## 2022-06-01 PROCEDURE — 80048 BASIC METABOLIC PNL TOTAL CA: CPT

## 2022-06-01 PROCEDURE — 93320 DOPPLER ECHO COMPLETE: CPT | Performed by: INTERNAL MEDICINE

## 2022-06-01 PROCEDURE — 3700000001 HC ADD 15 MINUTES (ANESTHESIA)

## 2022-06-01 PROCEDURE — 85027 COMPLETE CBC AUTOMATED: CPT

## 2022-06-01 PROCEDURE — 85610 PROTHROMBIN TIME: CPT

## 2022-06-01 PROCEDURE — B24BZZ4 ULTRASONOGRAPHY OF HEART WITH AORTA, TRANSESOPHAGEAL: ICD-10-PCS | Performed by: INTERNAL MEDICINE

## 2022-06-01 PROCEDURE — 2580000003 HC RX 258: Performed by: NURSE PRACTITIONER

## 2022-06-01 PROCEDURE — 2709999900 HC NON-CHARGEABLE SUPPLY

## 2022-06-01 PROCEDURE — 2060000000 HC ICU INTERMEDIATE R&B

## 2022-06-01 PROCEDURE — 3700000000 HC ANESTHESIA ATTENDED CARE

## 2022-06-01 PROCEDURE — 36415 COLL VENOUS BLD VENIPUNCTURE: CPT

## 2022-06-01 RX ORDER — PROPOFOL 10 MG/ML
INJECTION, EMULSION INTRAVENOUS PRN
Status: DISCONTINUED | OUTPATIENT
Start: 2022-06-01 | End: 2022-06-01 | Stop reason: SDUPTHER

## 2022-06-01 RX ORDER — WARFARIN SODIUM 2.5 MG/1
2.5 TABLET ORAL
Status: COMPLETED | OUTPATIENT
Start: 2022-06-01 | End: 2022-06-01

## 2022-06-01 RX ORDER — CEFAZOLIN SODIUM 1 G/3ML
2000 INJECTION, POWDER, FOR SOLUTION INTRAMUSCULAR; INTRAVENOUS EVERY 8 HOURS
Qty: 48 EACH | Refills: 0 | Status: SHIPPED | OUTPATIENT
Start: 2022-06-01 | End: 2022-06-09

## 2022-06-01 RX ORDER — POTASSIUM CHLORIDE 20 MEQ/1
40 TABLET, EXTENDED RELEASE ORAL ONCE
Status: COMPLETED | OUTPATIENT
Start: 2022-06-01 | End: 2022-06-01

## 2022-06-01 RX ORDER — AMLODIPINE BESYLATE 5 MG/1
5 TABLET ORAL DAILY
Status: DISCONTINUED | OUTPATIENT
Start: 2022-06-01 | End: 2022-06-02

## 2022-06-01 RX ADMIN — ATORVASTATIN CALCIUM 40 MG: 40 TABLET, FILM COATED ORAL at 22:47

## 2022-06-01 RX ADMIN — CEFAZOLIN 2000 MG: 2 INJECTION, POWDER, FOR SOLUTION INTRAMUSCULAR; INTRAVENOUS at 22:47

## 2022-06-01 RX ADMIN — DOCUSATE SODIUM 100 MG: 100 CAPSULE, LIQUID FILLED ORAL at 10:56

## 2022-06-01 RX ADMIN — METOPROLOL SUCCINATE 25 MG: 25 TABLET, EXTENDED RELEASE ORAL at 10:56

## 2022-06-01 RX ADMIN — WARFARIN SODIUM 2.5 MG: 2.5 TABLET ORAL at 18:54

## 2022-06-01 RX ADMIN — SODIUM CHLORIDE: 9 INJECTION, SOLUTION INTRAVENOUS at 10:58

## 2022-06-01 RX ADMIN — CEFAZOLIN 2000 MG: 2 INJECTION, POWDER, FOR SOLUTION INTRAMUSCULAR; INTRAVENOUS at 04:53

## 2022-06-01 RX ADMIN — POTASSIUM CHLORIDE 40 MEQ: 20 TABLET, EXTENDED RELEASE ORAL at 10:56

## 2022-06-01 RX ADMIN — DOCUSATE SODIUM 100 MG: 100 CAPSULE, LIQUID FILLED ORAL at 22:47

## 2022-06-01 RX ADMIN — AMLODIPINE BESYLATE 5 MG: 5 TABLET ORAL at 16:38

## 2022-06-01 RX ADMIN — PROPOFOL 120 MG: 10 INJECTION, EMULSION INTRAVENOUS at 08:47

## 2022-06-01 RX ADMIN — Medication 10 ML: at 22:47

## 2022-06-01 RX ADMIN — CEFAZOLIN 2000 MG: 2 INJECTION, POWDER, FOR SOLUTION INTRAMUSCULAR; INTRAVENOUS at 13:09

## 2022-06-01 ASSESSMENT — PAIN SCALES - GENERAL
PAINLEVEL_OUTOF10: 0

## 2022-06-01 NOTE — PROGRESS NOTES
Infectious Disease  Progress Note  [facility]    Chief Complaint: no complaint    Subjective: staph bacteremia  Up in chair. Sitter at bedside. No nausea or diarrhea.  Did not eat much breakfast   Scheduled Meds:   warfarin  2.5 mg Oral Once    ceFAZolin  2,000 mg IntraVENous Q8H    metoprolol succinate  25 mg Oral Daily    sodium chloride flush  5-40 mL IntraVENous 2 times per day    atorvastatin  40 mg Oral Nightly    docusate sodium  100 mg Oral BID    [Held by provider] furosemide  20 mg Oral Daily    [Held by provider] lisinopril  5 mg Oral Daily    pantoprazole  40 mg Oral QAM AC    warfarin placeholder: dosing by pharmacy   Other RX Placeholder     Continuous Infusions:   sodium chloride      sodium chloride 75 mL/hr at 06/01/22 1058     PRN Meds:sodium chloride flush, sodium chloride, ondansetron **OR** ondansetron, acetaminophen **OR** acetaminophen, bisacodyl, perflutren lipid microspheres, acetaminophen    Patient Vitals for the past 24 hrs:   BP Temp Temp src Pulse Resp SpO2   06/01/22 1049 (!) 160/78 98.4 °F (36.9 °C) Oral 64 -- 98 %   06/01/22 0904 (!) 130/56 -- -- 77 24 91 %   05/31/22 2019 (!) 143/95 97.9 °F (36.6 °C) Oral 85 18 95 %   05/31/22 1458 (!) 144/62 97.5 °F (36.4 °C) Temporal 66 20 --       CBC with Differential:    Lab Results   Component Value Date    WBC 11.9 06/01/2022    RBC 3.92 06/01/2022    HGB 12.1 06/01/2022    HCT 35.7 06/01/2022     06/01/2022    MCV 91.1 06/01/2022    MCH 30.9 06/01/2022    MCHC 33.9 06/01/2022    RDW 13.1 06/01/2022    LYMPHOPCT 19.2 05/27/2022    MONOPCT 4.4 05/27/2022    BASOPCT 0.2 05/27/2022    MONOSABS 0.74 05/27/2022    LYMPHSABS 3.26 05/27/2022    EOSABS 0.01 05/27/2022    BASOSABS 0.04 05/27/2022     CMP:    Lab Results   Component Value Date     06/01/2022    K 3.6 06/01/2022    K 4.1 05/27/2022     06/01/2022    CO2 17 06/01/2022    BUN 9 06/01/2022    CREATININE 0.6 06/01/2022    GFRAA >60 06/01/2022    LABGLOM >60 06/01/2022    GLUCOSE 119 06/01/2022    PROT 6.6 05/27/2022    LABALBU 3.7 05/27/2022    CALCIUM 8.0 06/01/2022    BILITOT 2.0 05/27/2022    ALKPHOS 99 05/27/2022    AST 43 05/27/2022    ALT 17 05/27/2022       BP (!) 160/78   Pulse 64   Temp 98.4 °F (36.9 °C) (Oral)   Resp 24   Ht 5' 2\" (1.575 m)   Wt 175 lb 0.5 oz (79.4 kg)   SpO2 98%   BMI 32.01 kg/m²     Physical Exam  Const/Neuro- unchanged, no signs of acute distress, Alert  ENMT- Within Normal Limits, Normocephalic, mucous membranes pink/moist, No thrush  Neck: Neck supple  Heart- Regular, Rate, Rhythm- no murmur appreciated. Lungs- clear to ascultation. Respirations even and nonlabored. Abdomen- Soft, bowel sounds positive, non tender  Musculo/Extremities-  Equal and symmetrical, no edema. No tenderness. Skin:  Warm and dry, free from rashes. Cultures reviewed    Radiology reviewed  CT HEAD WO CONTRAST   Final Result   CT HEAD WITHOUT CONTRAST:      1. No skull fracture or acute intracranial abnormality. CT CERVICAL SPINE WITHOUT CONTRAST:      1. No fracture or joint dislocation is seen. 2. Degenerative changes, as described. RECOMMENDATIONS:   Unavailable         CT CERVICAL SPINE WO CONTRAST   Final Result   CT HEAD WITHOUT CONTRAST:      1. No skull fracture or acute intracranial abnormality. CT CERVICAL SPINE WITHOUT CONTRAST:      1. No fracture or joint dislocation is seen. 2. Degenerative changes, as described. RECOMMENDATIONS:   Unavailable         CT CHEST W CONTRAST   Final Result   1. Large hiatal hernia   2. No pulmonary infiltrate or pleural effusion   3. Cardiomegaly and atherosclerotic disease      RECOMMENDATIONS:   Unavailable         CT ABDOMEN PELVIS W IV CONTRAST Additional Contrast? None   Final Result   1. Moderate to large hiatal hernia. 2. Cholelithiasis without evidence of acute cholecystitis. 3. Probable degenerating fibroids within the uterus.    4. Right adnexal cyst measures approximately

## 2022-06-01 NOTE — PROCEDURES
Jeremias Sanchez was performed by Dr Max Martinez with Susanne LAGUNAS assisting bubble study done by the rn

## 2022-06-01 NOTE — ANESTHESIA PRE PROCEDURE
Department of Anesthesiology  Preprocedure Note       Name:  Mayito Ashley   Age:  69 y.o.  :  1945                                          MRN:  [id number]         Date:  2022      Surgeon: Dr. Nay Cui    Procedure: CHAN    Medications prior to admission:   Prior to Admission medications    Medication Sig Start Date End Date Taking?  Authorizing Provider   lisinopril (PRINIVIL;ZESTRIL) 2.5 MG tablet TAKE 2 TABLETS BY MOUTH EVERY DAY 21   Tomeka Hua MD   warfarin (COUMADIN) 5 MG tablet TAKE 1 TABLET BY MOUTH EVERY DAY  Patient taking differently: Currently alt 2.5mg/5mg per PCP 21   Tomeka Hua MD   furosemide (LASIX) 20 MG tablet TAKE 1 TABLET BY MOUTH EVERY DAY 21   Tomeka Hua MD   carvedilol (COREG) 12.5 MG tablet TAKE 1 TABLET BY MOUTH TWICE A DAY WITH MEALS 21   Tomeka Hua MD   docusate sodium (COLACE) 100 MG capsule Take 100 mg by mouth 2 times daily    Historical Provider, MD   atorvastatin (LIPITOR) 40 MG tablet Take 1 tablet by mouth daily 2/15/20   Phyllis Cruz MD       Current medications:    Current Facility-Administered Medications   Medication Dose Route Frequency Provider Last Rate Last Admin    ceFAZolin (ANCEF) 2,000 mg in sterile water 20 mL IV syringe  2,000 mg IntraVENous Q8H Horace Huerta MD   2,000 mg at 22 0453    metoprolol succinate (TOPROL XL) extended release tablet 25 mg  25 mg Oral Daily Bee Rooney MD   25 mg at 22 0931    sodium chloride flush 0.9 % injection 5-40 mL  5-40 mL IntraVENous 2 times per day April KEIRA Sampson CNP   10 mL at 22    sodium chloride flush 0.9 % injection 5-40 mL  5-40 mL IntraVENous PRN April KEIRA Sampson - CNP        0.9 % sodium chloride infusion   IntraVENous PRN April KEIRA Sampson CNP        ondansetron (ZOFRAN-ODT) disintegrating tablet 4 mg  4 mg Oral Q8H PRN April KEIRA Sampson CNP        Or    ondansetron [facility] injection 4 mg  4 mg IntraVENous Q6H PRN April KEIRA Sampson CNP        acetaminophen (TYLENOL) tablet 650 mg  650 mg Oral Q6H PRN April KEIRA Sampson CNP        Or    acetaminophen (TYLENOL) suppository 650 mg  650 mg Rectal Q6H PRN April KEIRA Sampson CNP        bisacodyl (DULCOLAX) EC tablet 5 mg  5 mg Oral Daily PRN April KEIRA Sampson CNP        atorvastatin (LIPITOR) tablet 40 mg  40 mg Oral Nightly April KEIRA Sampson CNP   40 mg at 05/31/22 2023    docusate sodium (COLACE) capsule 100 mg  100 mg Oral BID April KEIRA Sampson CNP   100 mg at 05/31/22 2023    [Held by provider] furosemide (LASIX) tablet 20 mg  20 mg Oral Daily April KEIRA Sampson CNP        [Held by provider] lisinopril (PRINIVIL;ZESTRIL) tablet 5 mg  5 mg Oral Daily April KEIRA Sampson CNP        perflutren lipid microspheres (DEFINITY) injection 1.65 mg  1.5 mL IntraVENous ONCE PRN April KEIRA Sampson CNP        pantoprazole (PROTONIX) tablet 40 mg  40 mg Oral QAM AC Dante Alyson Luz Maria, DO   40 mg at 05/30/22 0850    acetaminophen (TYLENOL) tablet 650 mg  650 mg Oral Q4H PRN Loretta Rivera, DO   650 mg at 05/29/22 2116    warfarin placeholder: dosing by pharmacy   Other RX Placeholder April KEIRA Sampson CNP        0.9 % sodium chloride infusion   IntraVENous Continuous Dante Alyson Luz Maria, DO 75 mL/hr at 06/01/22 0811 NoRateChange at 06/01/22 3072       Allergies:  No Known Allergies    Problem List:    Patient Active Problem List   Diagnosis Code    NSTEMI (non-ST elevated myocardial infarction) ([facility] [address] I21.4    Status post coronary artery bypass graft Z95.1    Ischemic cardiomyopathy T74.1    Acute systolic congestive heart failure (HCC) I50.21    Obesity E66.9    Hyperlipidemia E78.5    UGIB (upper gastrointestinal bleed) K92.2    Acute on chronic systolic heart failure (HCC) I50.23    Atrial fibrillation with RVR ([address] I48.91    Chronic systolic (congestive) heart failure (HCC) I50.22    Chest pain R07.9    Chronic atrial fibrillation (HCC) I48.20    Nonrheumatic mitral valve regurgitation I34.0    Nonrheumatic tricuspid valve regurgitation I36.1    Coronary artery disease involving native coronary artery of native heart without angina pectoris I25.10    Hyperbilirubinemia E80.6    Syncope and collapse R55    Chronic CHF (congestive heart failure) (HCC) I50.9    Fever of unknown origin R50.9    Leukocytosis D72.829    Sepsis (HCC) A41.9    Hiatal hernia K44.9    Adnexal cyst N94.9    Elevated troponin R77.8    CAD (coronary artery disease) I25.10    Hypotension I95.9    Chronic anticoagulation Z79.01       Past Medical History:        Diagnosis Date    Acute systolic congestive heart failure (HCC) 2017    Atrial fibrillation (HCC)     CAD (coronary artery disease)     History of echocardiogram 2017    EF 26%    Hyperlipidemia 2017    Ischemic cardiomyopathy     Non-rheumatic tricuspid valve insufficiency     Nonrheumatic mitral (valve) insufficiency        Past Surgical History:        Procedure Laterality Date    CARDIAC CATHETERIZATION  2017    Dr. Pat Chaves  2017    DIAGNOSTIC CARDIAC CATH LAB PROCEDURE  2017    Dr. Alin Malin       Social History:    Social History     Tobacco Use    Smoking status: Former Smoker     Packs/day: 0.50     Years: 20.00     Pack years: 10.00     Types: Cigarettes     Quit date: 2002     Years since quittin.1    Smokeless tobacco: Never Used   Substance Use Topics    Alcohol use: Not Currently                                Counseling given: Not Answered      Vital Signs (Current):   Vitals:    22 0246 22 0938 22 1458 22 2019   BP: (!) 165/77 (!) 160/75 (!) 144/62 (!) 143/95   Pulse: 79 (!) 119 66 85   Resp:  20 20 18   Temp: (!) 35.9 °C (96.7 °F) 36.1 °C (97 °F) 36.4 °C (97.5 °F) 36.6 °C (97.9 °F)   TempSrc: Oral Temporal Temporal Oral   SpO2: 98% 99%  95%   Weight:       Height:                                                  BP Readings from Last 3 Encounters:   05/31/22 (!) 143/95   01/21/22 130/68   10/08/20 128/72       NPO Status:  >8 hrs                                                                               BMI:   Wt Readings from Last 3 Encounters:   05/29/22 175 lb 0.5 oz (79.4 kg)   01/21/22 171 lb 12.8 oz (77.9 kg)   10/08/20 159 lb 9.6 oz (72.4 kg)     Body mass index is 32.01 kg/m². CBC:   Lab Results   Component Value Date    WBC 11.9 06/01/2022    RBC 3.92 06/01/2022    HGB 12.1 06/01/2022    HCT 35.7 06/01/2022    MCV 91.1 06/01/2022    RDW 13.1 06/01/2022     06/01/2022       CMP:   Lab Results   Component Value Date     06/01/2022    K 3.6 06/01/2022    K 4.1 05/27/2022     06/01/2022    CO2 17 06/01/2022    BUN 9 06/01/2022    CREATININE 0.6 06/01/2022    GFRAA >60 06/01/2022    LABGLOM >60 06/01/2022    GLUCOSE 119 06/01/2022    PROT 6.6 05/27/2022    CALCIUM 8.0 06/01/2022    BILITOT 2.0 05/27/2022    ALKPHOS 99 05/27/2022    AST 43 05/27/2022    ALT 17 05/27/2022       POC Tests: No results for input(s): POCGLU, POCNA, POCK, POCCL, POCBUN, POCHEMO, POCHCT in the last 72 hours.     Coags:   Lab Results   Component Value Date    PROTIME 28.8 06/01/2022    INR 2.6 06/01/2022    APTT 25.9 04/20/2017       HCG (If Applicable): No results found for: PREGTESTUR, PREGSERUM, HCG, HCGQUANT     ABGs: No results found for: PHART, PO2ART, ZXM3NSI, GCZ0XVQ, BEART, B9ZWLEJE     Type & Screen (If Applicable):  No results found for: LABABO, LABRH    Drug/Infectious Status (If Applicable):  No results found for: HIV, HEPCAB    COVID-19 Screening (If Applicable):   Lab Results   Component Value Date    COVID19 Not Detected 05/26/2022           Anesthesia Evaluation  Patient summary reviewed and Nursing notes reviewed no history of anesthetic complications:   Airway: Mallampati: III  TM distance: <3 FB   Neck ROM: full  Mouth opening: < 3 FB   Dental:    (+) edentulous      Pulmonary: breath sounds clear to auscultation                            ROS comment: Former smoker   Cardiovascular:    (+) hypertension:, valvular problems/murmurs: MR, past MI:, CAD:, CABG/stent:, CHF: systolic, pulmonary hypertension: moderate, hyperlipidemia        Rhythm: irregular  Rate: normal           Beta Blocker:  Dose within 24 Hrs      ROS comment: Ischemic cardiomyopathy    PE comment: Atrial Fibrillation   Neuro/Psych:               GI/Hepatic/Renal:   (+) hiatal hernia,           Endo/Other:    (+) blood dyscrasia: anticoagulation therapy:., .                 Abdominal:             Vascular: Other Findings:           Anesthesia Plan      MAC     ASA 4       Induction: intravenous. Anesthetic plan and risks discussed with patient. Plan discussed with attending.                     KEIRA Aldridge - HUMBERTO   6/1/2022

## 2022-06-01 NOTE — ANESTHESIA POSTPROCEDURE EVALUATION
Department of Anesthesiology  Postprocedure Note    Patient: Beckie Moore  MRN: [id number]  YOB: 1945  Date of evaluation: 6/1/2022  Time:  9:18 AM     Procedure Summary     Date: 06/01/22 Room / Location: [facility] CATH LAB; [facility] ECHO    Anesthesia Start: 0348 Anesthesia Stop: 6490    Procedure: SEY CHAN Diagnosis:     Scheduled Providers: KEIRA Mendoza - CRNA; Balbina Seo MD Responsible Provider: Balbina Seo MD    Anesthesia Type: MAC ASA Status: 4          Anesthesia Type: No value filed. Deborah Phase I:      Deborah Phase II:      Last vitals: Reviewed and per EMR flowsheets.        Anesthesia Post Evaluation    Patient location during evaluation: PACU  Patient participation: complete - patient participated  Level of consciousness: awake  Pain score: 0  Airway patency: patent  Nausea & Vomiting: no nausea  Complications: no  Cardiovascular status: hemodynamically stable  Respiratory status: acceptable  Hydration status: stable

## 2022-06-01 NOTE — PROGRESS NOTES
Hospitalist Progress Note      PCP: Sarah Castillo MD    Date of Admission: 5/26/2022        Hospital Course:  **69 y. o. female presented with SYNCOPE.  SHE STATES SHE HAD BEEN FINE, AND WENT TO BED AND GOT UP TO GO TO THE BATHROOM AND WHEN SHE WAS LEAVING THE BATHROOM SHE FAINTED, , FELL BACKWARDS, AND WAS ON THE FLOOR FOR ABOUT AND HOUR BEFORE SHE GOT UP AND WAS ABLE TO [facility] FOR HELP. HAD CHEST PAIN, ACHING IN CHARACTER, LOCATED ACW, NO RADIATING, NO NV, NO DIAPHORESIS, NO SOB. FOUND TO HAVE STAPH HOMINIS  IN BLOOD CULTURE FOR CHAN** for Suggest 14 days total of iv antibiotics  With ancef    **        Subjective: **no complaints          Medications:  Reviewed    Infusion Medications    sodium chloride      sodium chloride 75 mL/hr at 06/01/22 1058     Scheduled Medications    warfarin  2.5 mg Oral Once    ceFAZolin  2,000 mg IntraVENous Q8H    metoprolol succinate  25 mg Oral Daily    sodium chloride flush  5-40 mL IntraVENous 2 times per day    atorvastatin  40 mg Oral Nightly    docusate sodium  100 mg Oral BID    [Held by provider] furosemide  20 mg Oral Daily    [Held by provider] lisinopril  5 mg Oral Daily    pantoprazole  40 mg Oral QAM AC    warfarin placeholder: dosing by pharmacy   Other RX Placeholder     PRN Meds: sodium chloride flush, sodium chloride, ondansetron **OR** ondansetron, acetaminophen **OR** acetaminophen, bisacodyl, perflutren lipid microspheres, acetaminophen      Intake/Output Summary (Last 24 hours) at 6/1/2022 1454  Last data filed at 6/1/2022 1034  Gross per 24 hour   Intake 110 ml   Output --   Net 110 ml       Exam:    BP (!) 160/78   Pulse 64   Temp 98.4 °F (36.9 °C) (Oral)   Resp 24   Ht 5' 2\" (1.575 m)   Wt 175 lb 0.5 oz (79.4 kg)   SpO2 98%   BMI 32.01 kg/m²     General appearance:  No apparent distress,   HEENT:  Normal cephalic, atraumatic without obvious deformity. Neck: Supple, with full range of motion. No jugular venous distention.  Trachea midline. Respiratory:  Normal respiratory effort. Clear to auscultation, bilaterally without Rales/Wheezes/Rhonchi. Cardiovascular:  IRREG  Abdomen: Soft, non-tender, non-distended with normal bowel sounds. Musculoskeletal:  No clubbing, cyanosis or edema bilaterally.    Skin: Skin color, texture, turgor normal.  No rashes or lesions. Neurologic:  Neurovascularly intact wCranial nerves: II-XII intact,   Psychiatric:  Alert and oriented, thought content appropriate, normal insight                               Labs:   Recent Labs     05/30/22  0620 05/31/22  0833 06/01/22  0631   WBC 9.7 9.9 11.9*   HGB 12.6 11.7 12.1   HCT 38.5 35.1 35.7   * 126* 151     Recent Labs     05/30/22  0620 05/31/22  0833 06/01/22  0631    137 140   K 3.7 4.0 3.6    106 108*   CO2 21* 17* 17*   BUN 10 8 9   CREATININE 0.7 0.6 0.6   CALCIUM 8.3* 7.9* 8.0*     No results for input(s): AST, ALT, BILIDIR, BILITOT, ALKPHOS in the last 72 hours. Recent Labs     05/30/22  0620 05/31/22  0833 06/01/22  0631   INR 3.3 3.6 2.6     Recent Labs     05/30/22  1331 05/31/22  0833   CKTOTAL 652* 406*     No results for input(s): AST, ALT, ALB, BILIDIR, BILITOT, ALKPHOS in the last 72 hours. No results for input(s): LACTA in the last 72 hours.   No results found for: Moe Lerma  No results found for: AMMONIA    Assessment:    Active Hospital Problems    Diagnosis Date Noted    Hypotension [I95.9]      Priority: Medium    Chronic anticoagulation [Z79.01]      Priority: Medium    Chronic CHF (congestive heart failure) ([address] [I50.9] 05/27/2022     Priority: Medium    Fever of unknown origin [R50.9] 05/27/2022     Priority: Medium    Leukocytosis [D72.829] 05/27/2022     Priority: Medium    Sepsis ([address] [A41.9] 05/27/2022     Priority: Medium    Hiatal hernia [K44.9] 05/27/2022     Priority: Medium    Adnexal cyst [N94.9] 05/27/2022     Priority: Medium    Elevated troponin [R77.8] 05/27/2022     Priority: Medium    CAD (coronary artery disease) [I25.10] 05/27/2022     Priority: Medium    Syncope and collapse [R55] 05/26/2022     Priority: Medium    Chronic systolic (congestive) heart failure (HCC) [I50.22] 06/26/2020    Chronic atrial fibrillation (HCC) [I48.20]     Hyperlipidemia [E78.5] 04/24/2017    Status post coronary artery bypass graft [Z95.1] 04/22/2017   RHABDOMYOLYSIS  ELEVATED TT  CHEST PAIN   H/O CABG   SYNCOPE COLLAPSE  ICM  PAF  LEUKOCYTOSIS   HYPERPYREXIA  TRANSAMINITIS   HLD   STAPH HOMINIS IN BLOOD CUTURE  PLAN:  HYDRATION  COREG   LIPITOR  CHAN   ZESTRIL   ancef        DVT Prophylaxis:   coumadin  Diet: ADULT DIET; Regular; Low Fat/Low Chol/High Fiber/2 gm Na;  No Added Salt (3-4 gm); 1500 ml  Code Status: Full Code     PT/OT Eval Status: *ORDERED     Dispo -[facility]      Electronically signed by Heidi Goetz DO on 6/1/2022 at 2:54 PM Maxine george

## 2022-06-01 NOTE — PROGRESS NOTES
Pharmacy Consultation Note  (Warfarin Dosing and Monitoring)  Initial consult date: 5-  Consulting Provider: EB Sampson, APRN-ARTEM    Larry Marsh is a 69 y.o. female, 157.5 cm, 72.6 kg for whom pharmacy has been asked to manage warfarin therapy. TSH:    Lab Results   Component Value Date    TSH 4.630 02/14/2020       Hepatic Function Panel:                            Lab Results   Component Value Date    ALKPHOS 99 05/27/2022    ALT 17 05/27/2022    AST 43 05/27/2022    PROT 6.6 05/27/2022    BILITOT 2.0 05/27/2022    BILIDIR 0.4 05/27/2022    IBILI 1.6 05/27/2022    LABALBU 3.7 05/27/2022       Current warfarin drug-drug interactions include: No significant interactions    Recent Labs     05/30/22  0620 05/31/22  0833 06/01/22  0631   HGB 12.6 11.7 12.1   * 126* 151     Date Warfarin Dose INR Heparin or LMWH Comment   5/26 UTD 2.8 X    5/27 2.5 mg 2.7 stop enoxaparin order    5/28  5 mg  2.1 X    5/29 5 mg  2.2 X    5/30 HOLD 3.3 X    5/31 HOLD 3.6 X    6/1 < 2.5 mg > 2.6 X      Assessment:  · 78 yo/F admitted from home d/t syncopal episode, fall (hit head), and LOC x~2 hr PTA. · On warfarin PTA for Hx of AF. Home dose = 2.5 mg once daily per PCP since Sep 2021 per Med Rec; Cards office visit note from Jan 2022 lists warfarin as 5 mg once daily. · Goal INR 2 - 3  · INR 2.6 today    Plan:  · Give warfarin 2.5 mg tonight  · Daily PT/INR until the INR is stable within the therapeutic range. · Pharmacist will follow and monitor/adjust dosing as necessary. Genie Brown.  Faye Baca, PharmD 6/1/2022 7:56 AM

## 2022-06-02 LAB
ANION GAP SERPL CALCULATED.3IONS-SCNC: 13 MMOL/L (ref 7–16)
BUN BLDV-MCNC: 9 MG/DL (ref 6–23)
CALCIUM SERPL-MCNC: 8.4 MG/DL (ref 8.6–10.2)
CHLORIDE BLD-SCNC: 107 MMOL/L (ref 98–107)
CO2: 17 MMOL/L (ref 22–29)
CREAT SERPL-MCNC: 0.6 MG/DL (ref 0.5–1)
GFR AFRICAN AMERICAN: >60
GFR NON-AFRICAN AMERICAN: >60 ML/MIN/1.73
GLUCOSE BLD-MCNC: 151 MG/DL (ref 74–99)
HCT VFR BLD CALC: 36.9 % (ref 34–48)
HEMOGLOBIN: 12.4 G/DL (ref 11.5–15.5)
INR BLD: 2.9
MCH RBC QN AUTO: 30.7 PG (ref 26–35)
MCHC RBC AUTO-ENTMCNC: 33.6 % (ref 32–34.5)
MCV RBC AUTO: 91.3 FL (ref 80–99.9)
PDW BLD-RTO: 13.2 FL (ref 11.5–15)
PLATELET # BLD: 200 E9/L (ref 130–450)
PMV BLD AUTO: 9.4 FL (ref 7–12)
POTASSIUM SERPL-SCNC: 3.9 MMOL/L (ref 3.5–5)
PROTHROMBIN TIME: 31.4 SEC (ref 9.3–12.4)
RBC # BLD: 4.04 E12/L (ref 3.5–5.5)
SODIUM BLD-SCNC: 137 MMOL/L (ref 132–146)
WBC # BLD: 14.6 E9/L (ref 4.5–11.5)

## 2022-06-02 PROCEDURE — 6360000002 HC RX W HCPCS: Performed by: INTERNAL MEDICINE

## 2022-06-02 PROCEDURE — 97530 THERAPEUTIC ACTIVITIES: CPT

## 2022-06-02 PROCEDURE — 97535 SELF CARE MNGMENT TRAINING: CPT

## 2022-06-02 PROCEDURE — 2580000003 HC RX 258: Performed by: INTERNAL MEDICINE

## 2022-06-02 PROCEDURE — C1751 CATH, INF, PER/CENT/MIDLINE: HCPCS

## 2022-06-02 PROCEDURE — 36415 COLL VENOUS BLD VENIPUNCTURE: CPT

## 2022-06-02 PROCEDURE — 80048 BASIC METABOLIC PNL TOTAL CA: CPT

## 2022-06-02 PROCEDURE — 2060000000 HC ICU INTERMEDIATE R&B

## 2022-06-02 PROCEDURE — 6370000000 HC RX 637 (ALT 250 FOR IP): Performed by: INTERNAL MEDICINE

## 2022-06-02 PROCEDURE — 6370000000 HC RX 637 (ALT 250 FOR IP): Performed by: NURSE PRACTITIONER

## 2022-06-02 PROCEDURE — 36410 VNPNXR 3YR/> PHY/QHP DX/THER: CPT

## 2022-06-02 PROCEDURE — 2580000003 HC RX 258: Performed by: NURSE PRACTITIONER

## 2022-06-02 PROCEDURE — 02HV33Z INSERTION OF INFUSION DEVICE INTO SUPERIOR VENA CAVA, PERCUTANEOUS APPROACH: ICD-10-PCS | Performed by: INTERNAL MEDICINE

## 2022-06-02 PROCEDURE — 85610 PROTHROMBIN TIME: CPT

## 2022-06-02 PROCEDURE — 76937 US GUIDE VASCULAR ACCESS: CPT

## 2022-06-02 PROCEDURE — 85027 COMPLETE CBC AUTOMATED: CPT

## 2022-06-02 RX ORDER — WARFARIN SODIUM 2.5 MG/1
2.5 TABLET ORAL
Status: COMPLETED | OUTPATIENT
Start: 2022-06-02 | End: 2022-06-02

## 2022-06-02 RX ORDER — HEPARIN SODIUM (PORCINE) LOCK FLUSH IV SOLN 100 UNIT/ML 100 UNIT/ML
1 SOLUTION INTRAVENOUS EVERY 12 HOURS SCHEDULED
Status: DISCONTINUED | OUTPATIENT
Start: 2022-06-02 | End: 2022-06-04 | Stop reason: HOSPADM

## 2022-06-02 RX ORDER — AMLODIPINE BESYLATE 10 MG/1
10 TABLET ORAL DAILY
Status: DISCONTINUED | OUTPATIENT
Start: 2022-06-02 | End: 2022-06-04 | Stop reason: HOSPADM

## 2022-06-02 RX ORDER — SODIUM CHLORIDE 0.9 % (FLUSH) 0.9 %
5-40 SYRINGE (ML) INJECTION PRN
Status: DISCONTINUED | OUTPATIENT
Start: 2022-06-02 | End: 2022-06-04 | Stop reason: HOSPADM

## 2022-06-02 RX ORDER — HEPARIN SODIUM (PORCINE) LOCK FLUSH IV SOLN 100 UNIT/ML 100 UNIT/ML
1 SOLUTION INTRAVENOUS PRN
Status: DISCONTINUED | OUTPATIENT
Start: 2022-06-02 | End: 2022-06-04 | Stop reason: HOSPADM

## 2022-06-02 RX ORDER — SODIUM CHLORIDE 0.9 % (FLUSH) 0.9 %
5-40 SYRINGE (ML) INJECTION EVERY 12 HOURS SCHEDULED
Status: DISCONTINUED | OUTPATIENT
Start: 2022-06-02 | End: 2022-06-04 | Stop reason: HOSPADM

## 2022-06-02 RX ORDER — SODIUM CHLORIDE 9 MG/ML
INJECTION, SOLUTION INTRAVENOUS PRN
Status: DISCONTINUED | OUTPATIENT
Start: 2022-06-02 | End: 2022-06-04 | Stop reason: HOSPADM

## 2022-06-02 RX ADMIN — AMLODIPINE BESYLATE 10 MG: 10 TABLET ORAL at 09:55

## 2022-06-02 RX ADMIN — METOPROLOL SUCCINATE 25 MG: 25 TABLET, EXTENDED RELEASE ORAL at 10:01

## 2022-06-02 RX ADMIN — SODIUM CHLORIDE, PRESERVATIVE FREE 100 UNITS: 5 INJECTION INTRAVENOUS at 13:20

## 2022-06-02 RX ADMIN — SODIUM CHLORIDE, PRESERVATIVE FREE 10 ML: 5 INJECTION INTRAVENOUS at 20:19

## 2022-06-02 RX ADMIN — CEFAZOLIN 2000 MG: 2 INJECTION, POWDER, FOR SOLUTION INTRAMUSCULAR; INTRAVENOUS at 05:51

## 2022-06-02 RX ADMIN — SODIUM CHLORIDE, PRESERVATIVE FREE 100 UNITS: 5 INJECTION INTRAVENOUS at 20:13

## 2022-06-02 RX ADMIN — Medication 10 ML: at 10:02

## 2022-06-02 RX ADMIN — DOCUSATE SODIUM 100 MG: 100 CAPSULE, LIQUID FILLED ORAL at 20:12

## 2022-06-02 RX ADMIN — WARFARIN SODIUM 2.5 MG: 2.5 TABLET ORAL at 17:50

## 2022-06-02 RX ADMIN — DOCUSATE SODIUM 100 MG: 100 CAPSULE, LIQUID FILLED ORAL at 10:01

## 2022-06-02 RX ADMIN — SODIUM CHLORIDE: 9 INJECTION, SOLUTION INTRAVENOUS at 10:15

## 2022-06-02 RX ADMIN — ATORVASTATIN CALCIUM 40 MG: 40 TABLET, FILM COATED ORAL at 20:12

## 2022-06-02 RX ADMIN — CEFAZOLIN 2000 MG: 2 INJECTION, POWDER, FOR SOLUTION INTRAMUSCULAR; INTRAVENOUS at 13:20

## 2022-06-02 RX ADMIN — CEFAZOLIN 2000 MG: 2 INJECTION, POWDER, FOR SOLUTION INTRAMUSCULAR; INTRAVENOUS at 20:12

## 2022-06-02 ASSESSMENT — PAIN SCALES - GENERAL
PAINLEVEL_OUTOF10: 0
PAINLEVEL_OUTOF10: 0

## 2022-06-02 NOTE — PROGRESS NOTES
Physical Therapy  Physical Therapy Treatment    Name: Ad [facility]  : 1945  MRN: [id number]      Date of Service: 2022    Evaluating PT:  Jose Win PT, DPT    Room #:  1001/8053-Y  Diagnosis:  Syncope and collapse [R55]  Fever of unknown origin [R50.9]  PMHx/PSHx:  CAD, afib, HLD  Procedure/Surgery:  CHAN (2022)  Precautions:  Falls, sitter  Equipment Needs:  FWW    SUBJECTIVE:    Pt lives alone in a basement level apt with 4 steps to enter and 1 handrail. Pt ambulated with no AD PTA. OBJECTIVE:   Initial Evaluation  Date: 22 Treatment  2022 Short Term/ Long Term   Goals   AM-PAC 6 Clicks  00    Was pt agreeable to Eval/treatment? yes Yes    Does pt have pain? No pain Mild back    Bed Mobility  Rolling: NT  Supine to sit: NT  Sit to supine: NT  Scooting: SBA Rolling: Betsy  Supine to sit: ModA  Sit to supine: Betsy  Scooting: Betsy (seated) Rolling: IND  Supine to sit: IND  Sit to supine: IND  Scooting: IND   Transfers Sit to stand: SBA  Stand to sit: SBA  Stand pivot: SBA with no AD Sit to stand: Betsy  Stand to sit: Betsy  Stand pivot: NT Sit to stand: IND  Stand to sit: IND  Stand pivot: IND with no AD   Ambulation    200' with no AD SBA 5' with no AD Betsy; 36', 36' with [employer] '+ with no AD IND   Stair negotiation: ascended and descended  NT, pt declined need to attempt this date NT 4 steps with 1 handrail mod I     Balance:   Sitting EOB: Supervision  Dynamic Standing: Betsy with no AD; SBA with [employer]    Pt is A & O x 4  Sensation:  No reports of numbness/tingling to extremities  Edema:  Unremarkable    Vitals:   HR 80, SpO2 96% at rest.  HR 95-96, SpO2 96-99% with activity. Patient education  Pt educated on safety during functional mobility. Patient response to education:   Pt verbalized understanding Pt demonstrated skill Pt requires further education in this area   Yes Yes Yes     ASSESSMENT:    Comments:  Session cleared by nursing.  Patient in semi-Guardado's position with sitter present upon arrival; agreeable to PT session. Required increased time, assistance of trunk and BLE to perform bed mobility. Tolerated sitting EOB for extended periods of time throughout session. Performed several sit <> stand transfers; required increased time, verbal cues related to positioning/sequencing to ensure safety during such. Ambulated with decreased speed, narrow ELIANE; exhibited increased stability with use of WW compared to using no AD; recommended use of WW to ensure safety during functional mobility. Activity limited by fatigue. Rest breaks provided between activity bouts. Denied dizziness, shortness of breath. Vitals monitored and noted. Patient left in semi-Guardado's position with sitter and nursing present, call light in reach. Treatment:  Patient practiced and was instructed in the following treatment:     Bed mobility - verbal cues to facilitate proper positioning and sequencing; physical assistance provided during activity   Static sitting - performed to promote upright tolerance and balance maintenance   Functional transfers - verbal cues to facilitate proper positioning and sequencing, particularly related to hand/foot placement; physical assistance provided during activity   Ambulation - verbal cues to facilitate proper positioning, particularly related to walker approximation and base of support; physical assistance provided as needed during activity    PLAN:    Patient is making good progress towards established goals. Will continue with current POC.       Time in  0930  Time out  0955    Total Treatment Time  25 minutes     CPT codes:  [] Gait training 45119 0 minutes  [] Manual therapy 52161 0 minutes  [x] Therapeutic activities 43744 25 minutes  [] Therapeutic exercises 00809 0 minutes  [] Neuromuscular reeducation 95527 0 minutes    Freedom Singh, PT, DPT  [id number]

## 2022-06-02 NOTE — PROGRESS NOTES
Comprehensive Nutrition Assessment    Type and Reason for Visit:  Initial,RD Nutrition Re-Screen/LOS    Nutrition Recommendations/Plan:   1. Continue current diet  2. No nutrition intervention indicated at this time  3. Continue to monitor     Malnutrition Assessment:  Malnutrition Status:  No malnutrition (06/02/22 1300)    Context:  Chronic Illness     Findings of the 6 clinical characteristics of malnutrition:  Energy Intake:  No significant decrease in energy intake  Weight Loss:  Unable to assess (BALDOMERO dt fluid shift 2/2 CHF)     Body Fat Loss:  No significant body fat loss     Muscle Mass Loss:  No significant muscle mass loss    Fluid Accumulation:  No significant fluid accumulation     Strength:  Not Performed    Nutrition Assessment:    Pt admitted w/ syncope and collapse ; noted Positive BC-Staph species. Pt intake stable consuming % of meals ; PMHx of CHF/CAD s/p CABG 2017 ; will continue to monitor    Nutrition Related Findings:    I&O (+ 4 L) ; A&Ox3; no edema ; + BS, Wound Type: None       Current Nutrition Intake & Therapies:    Average Meal Intake: %  Average Supplements Intake: None Ordered  ADULT DIET; Regular; Low Fat/Low Chol/High Fiber/2 gm Na; No Added Salt (3-4 gm); 1500 ml    Anthropometric Measures:  Height: 5' 2\" (157.5 cm)  Ideal Body Weight (IBW): 110 lbs (50 kg)    Admission Body Weight: 166 lb 0.2 oz (75.3 kg) (5/28, actual)  Current Body Weight: 176 lb (79.8 kg) (6/2, standing), 160 % IBW. Weight Source: Standing Scale  Current BMI (kg/m2): 32.2  Usual Body Weight: 171 lb (77.6 kg) (EMR show past wt of 171# on 1/21/22)  % Weight Change (Calculated): 2.9                    BMI Categories: Obese Class 1 (BMI 30.0-34. 9)    Estimated Daily Nutrient Needs:  Energy Requirements Based On: Formula  Weight Used for Energy Requirements: Current  Energy (kcal/day): 0474-5404 (REE 1242 x 1.2 SF)  Weight Used for Protein Requirements: Ideal  Protein (g/day): 75-90g/day (1.5-1.8g/kg)  Method Used for Fluid Requirements: 1 ml/kcal  Fluid (ml/day): 1400-1500ml    Nutrition Diagnosis:   No nutrition diagnosis at this time    Nutrition Interventions:   Food and/or Nutrient Delivery: Continue Current Diet  Nutrition Education/Counseling: Education not indicated  Coordination of Nutrition Care: Continue to monitor while inpatient       Goals:     Goals: PO intake 50% or greater,by next RD assessment       Nutrition Monitoring and Evaluation:   Behavioral-Environmental Outcomes: None Identified  Food/Nutrient Intake Outcomes: Food and Nutrient Intake  Physical Signs/Symptoms Outcomes: Biochemical Data,Chewing or Swallowing,GI Status,Fluid Status or Edema,Meal Time Behavior,Nutrition Focused Physical Findings,Skin,Weight    Discharge Planning:     Too soon to determine     Shaista Butler  Contact: [phone]

## 2022-06-02 NOTE — PLAN OF CARE
Problem: Discharge Planning  Goal: Discharge to home or other facility with appropriate resources  Outcome: Progressing     Problem: Safety - Adult  Goal: Free from fall injury  Outcome: Progressing  Flowsheets  Taken 6/2/2022 0330 by Karlo Colunga RN  Free From Fall Injury: Instruct family/caregiver on patient safety  Taken 6/1/2022 1500 by Florentin Borges RN  Free From Fall Injury: Instruct family/caregiver on patient safety     Problem: Pain  Goal: Verbalizes/displays adequate comfort level or baseline comfort level  Outcome: Progressing     Problem: Chronic Conditions and Co-morbidities  Goal: Patient's chronic conditions and co-morbidity symptoms are monitored and maintained or improved  Outcome: Progressing     Problem: Skin/Tissue Integrity  Goal: Absence of new skin breakdown  Description: 1. Monitor for areas of redness and/or skin breakdown  2. Assess vascular access sites hourly  3. Every 4-6 hours minimum:  Change oxygen saturation probe site  4. Every 4-6 hours:  If on nasal continuous positive airway pressure, respiratory therapy assess nares and determine need for appliance change or resting period.   Outcome: Progressing

## 2022-06-02 NOTE — PROGRESS NOTES
Occupational Therapy  OT BEDSIDE TREATMENT NOTE   [address] [address] [address]  Patient Name: Alexei Lan  MRN: [id number]  : 1945  Room: [address] [place] [address]     Per OT Eval:    Evaluating OT: ISABELLA Guo, OTR/L [id number]  Referring Vernon Haddad DO  Specific Provider Orders: OT eval and treat   Recommended Adaptive Equipment: shower chair      Diagnosis: syncope and collapse, fever   Surgery: none   Pertinent Medical History: CAD, a-fib, HLD   Precautions:  Fall Risk, sitter     Assessment of current deficits   [x]? Functional mobility           [x]? ADLs           [x]? Strength                  [x]? Cognition   [x]? Functional transfers         [x]? IADLs         [x]? Safety Awareness   [x]? Endurance   []? Fine Coordination                         [x]? Balance      []? Vision/perception   [x]? Sensation     []? Gross Motor Coordination             []? ROM           []?  Delirium                   []? Motor Control      OT PLAN OF CARE   OT POC based on physician orders, patient diagnosis and results of clinical assessment     Frequency/Duration: 2-3 days/wk for 2 weeks PRN   Specific OT Treatment to include:   * Instruction/training on adapted ADL techniques and AE recommendations to increase functional independence within precautions       * Training on energy conservation strategies, correct breathing pattern and techniques to improve independence/tolerance for self-care routine  * Functional transfer/mobility training/DME recommendations for increased independence, safety, and fall prevention  * Patient/Family education to increase follow through with safety techniques and functional independence  * Recommendation of environmental modifications for increased safety with functional transfers/mobility and ADLs  * Therapeutic exercise to improve motor endurance, ROM, and functional strength for ADLs/functional transfers  * Therapeutic activities to facilitate/challenge dynamic balance, stand tolerance for increased safety and independence with ADLs  * Neuro-muscular re-education: facilitation of righting/equilibrium reactions, midline orientation, scapular stability/mobility, normalization of muscle tone, and facilitation of volitional active controled movement     Home Living: Pt lives alone in a 1 story home; bed/bath on main level   Bathroom setup: tub shower unit   Equipment owned: none  Prior Level of Function: IND with ADLs/IADLs; using no AD for functional mobility   Driving: yes     Pain Level: 0/10    Cognition: A&O: 3/4; Follows 2 step directions, pleasant & cooperative              Memory:  fair              Sequencing:  fair              Problem solving:  fair              Judgement/safety:  fair      Functional Assessment:  AM-PAC Daily Activity Raw Score: 19/24    Initial Eval Status  Date: 5/28/22 Treatment Status  Date:  6/2/22 STGs=LTGs  Time Frame: 10-14 days   Feeding IND (pt able to open packages and self feed)        Grooming SBA (standing at sink)   SBA IND   UB Dressing SBA  SBA   IND   LB Dressing SBA (pt able to use crossing of leg technique to heaven B socks)  Min A  Assist to thread legs into pants, pt able to pull over hips  IND    Bathing SBA (simulated) SBA  Simulated  SUP    Toileting SBA SBA  Simulated, declined need  IND   Bed Mobility  Log roll: SBA  Supine to sit: SBA   Sit to supine: NT  SBA  Supine to sit  Log roll IND  Supine to sit: IND   Sit to supine: IND   Functional Transfers Sit to stand:SBA   Stand to sit:SBA  Commode: SBA SBA  Cues for hand placement & technique  IND   Functional Mobility SBA (using ww, to/from bathroom) SBA  With walker, household distances, occasional standing rest breaks  IND   Balance Sitting: SBA  Standing: SBA  Sitting: Indep  Standing: SBA     Activity Tolerance fair  Fair  Mod tasks, mild SOB at end mobility   99% on RA   Visual/  Perceptual Glasses: yes                               Education:  Pt was educated through out treatment regarding proper technique & safety with bed mobility, functional transfers & mobility, energy conservation due to mild SOB & ADL compensatory strategies to ease tasks, improve safety & prevent falls to return home safely. Comments: Upon arrival pt was in bed reading & agreeable for therapy. At end of session pt was seated in chair, all lines and tubes intact, call light within reach. Staff member present    · Pt has made Good progress towards set goals. · Continue with current plan of care      Treatment Time In: 12:00            Treatment Time Out: 12:25           Treatment Charges: Mins Units   Ther Ex  [id number]     Manual Therapy [id number] [facility]     Thera Activities 43687 15 1   ADL/Home Mgt 35338 10 1   Neuro Re-ed [id number]     Group Therapy      Orthotic manage/training  [id number]     Non-Billable Time     Total Timed Treatment 25 2       Sherron BARROSO  [address] [place] [address], [address] [place] [address]

## 2022-06-02 NOTE — CARE COORDINATION
Per IDR - Will need midline for iv antibiotics. Met with to discuss need for iv abxs. Ancef iv q 8 Patient states she lives alone. She asked that I call her niece for SNF list. [facility] and spoke with laura Rodriguez. Offered SNF choices 1. [facility] (was there in past) 2. [address] [facility] [place]. Await pt/ot notes. Call placed to [facility] x [phone] for needed notes for insurance precert. Referral to [facility] for [facility]. Await determination. Electronically signed by Jemima Messina RN on 6/2/2022 at 11:17 AM    Has been accepted [facility]- will need precert and negative covid day of discharge. Patient and niece Kvng Rodriguez updated. cm/sw to follow. Electronically signed by Jemima Messina RN on 6/2/2022 at 11:55 AM    Notified Hermelinda @ [facility] that pt/ot notes are in for insurance precert. Electronically signed by Jemima Messina RN on 6/2/2022 at [facility] exemption initiated, ambulette form and envelope in soft chart. Electronically signed by Jemima Messina RN on 6/2/2022 at 1:55 PM         The Plan for Transition of Care is related to the following treatment goals: functional mobiility    The Patient and/or patient representative laura Rodriguez was provided with a choice of provider and agrees   with the discharge plan. [x] Yes [] No    Freedom of choice list was provided with basic dialogue that supports the patient's individualized plan of care/goals, treatment preferences and shares the quality data associated with the providers.  [x] Yes [] No

## 2022-06-02 NOTE — PROGRESS NOTES
Dr Brenton Monaco about pts blood pressure 168/92. Also requested something to help patient sleep. Awaiting response.

## 2022-06-02 NOTE — PROGRESS NOTES
Pharmacy Consultation Note  (Warfarin Dosing and Monitoring)  Initial consult date: 5-  Consulting Provider: EB Sampson, APRN-CNP    Larry Marsh is a 69 y.o. female, 157.5 cm, 72.6 kg for whom pharmacy has been asked to manage warfarin therapy. TSH:    Lab Results   Component Value Date    TSH 4.630 02/14/2020       Hepatic Function Panel:                            Lab Results   Component Value Date    ALKPHOS 99 05/27/2022    ALT 17 05/27/2022    AST 43 05/27/2022    PROT 6.6 05/27/2022    BILITOT 2.0 05/27/2022    BILIDIR 0.4 05/27/2022    IBILI 1.6 05/27/2022    LABALBU 3.7 05/27/2022       Current warfarin drug-drug interactions include: No significant interactions    Recent Labs     05/31/22  0833 06/01/22  0631 06/02/22  0704   HGB 11.7 12.1 12.4   * 151 200     Date Warfarin Dose INR Heparin or LMWH Comment   5/26 UTD 2.8 --    5/27 2.5 mg 2.7 stop enoxaparin order Vancomycin   5/28  5 mg  2.1 -- Vancomycin   5/29 5 mg  2.2 -- Cefazolin   5/30 HOLD 3.3 -- Cefazolin   5/31 HOLD 3.6 -- Cefazolin   6/1 2.5 mg 2.6 -- Cefazolin   6/2 <2.5 mg> 2.9 -- Cefazolin     Assessment:  · 76 yo/F admitted from home d/t syncopal episode, fall (hit head), and LOC x~2 hr PTA. · On warfarin PTA for Hx of AF. Home dose = 2.5 mg once daily per PCP since Sep 2021 per Med Rec; Cards office visit note from Jan 2022 lists warfarin as 5 mg once daily. · Goal INR 2 - 3  · INR 2.9 today    Plan:  · Give warfarin 2.5 mg tonight  · Daily PT/INR until the INR is stable within the therapeutic range. · Pharmacist will follow and monitor/adjust dosing as necessary.     Brenda Grijalva, PharmD 6/2/2022 10:05 AM

## 2022-06-02 NOTE — PROGRESS NOTES
Power midline Placement 6/2/2022    Product number: [id number]   Lot Number: [id number]      Ultrasound: yes   Left Basilic vein:                Upper Arm Circumference: 30cm    Size: 4.5frsl    Exposed Length: 2cm    Internal Length: 13cm   Cut: 0cm   Vein Measurement: 0.60cm    Derrick Crabtree RN  6/2/2022  1:55 PM

## 2022-06-02 NOTE — PROGRESS NOTES
Hospitalist Progress Note      PCP: Ame Sosa MD    Date of Admission: 5/26/2022        Hospital Course:  *69 y. o. female presented with SYNCOPE.  SHE STATES SHE HAD BEEN FINE, AND WENT TO BED AND GOT UP TO GO TO THE BATHROOM AND WHEN SHE WAS LEAVING THE BATHROOM SHE FAINTED, , FELL BACKWARDS, AND WAS ON THE FLOOR FOR ABOUT AND HOUR BEFORE SHE GOT UP AND WAS ABLE TO [facility] FOR HELP.  HAD CHEST PAIN, ACHING IN CHARACTER, LOCATED ACW, NO RADIATING, NO NV, NO DIAPHORESIS, NO SOB.  FOUND TO HAVE STAPH HOMINIS  IN BLOOD CULTURE FOR CHAN** for Suggest 14 days total of iv antibiotics  With ancef  will dc to [facility]   **   *        Subjective: **no complaints          Medications:  Reviewed    Infusion Medications    sodium chloride      sodium chloride 75 mL/hr at 06/02/22 1015     Scheduled Medications    amLODIPine  10 mg Oral Daily    lidocaine  5 mL IntraDERmal Once    sodium chloride flush  5-40 mL IntraVENous 2 times per day    heparin flush  1 mL IntraVENous 2 times per day    ceFAZolin  2,000 mg IntraVENous Q8H    metoprolol succinate  25 mg Oral Daily    atorvastatin  40 mg Oral Nightly    docusate sodium  100 mg Oral BID    [Held by provider] furosemide  20 mg Oral Daily    [Held by provider] lisinopril  5 mg Oral Daily    pantoprazole  40 mg Oral QAM AC    warfarin placeholder: dosing by pharmacy   Other RX Placeholder     PRN Meds: sodium chloride flush, sodium chloride, heparin flush, ondansetron **OR** ondansetron, acetaminophen **OR** acetaminophen, bisacodyl, perflutren lipid microspheres, acetaminophen      Intake/Output Summary (Last 24 hours) at 6/2/2022 1906  Last data filed at 6/2/2022 1846  Gross per 24 hour   Intake 2270.46 ml   Output 650 ml   Net 1620.46 ml       Exam:    BP (!) 145/80   Pulse 76   Temp 97.1 °F (36.2 °C) (Temporal)   Resp 18   Ht 5' 2\" (1.575 m)   Wt 176 lb (79.8 kg)   SpO2 97%   BMI 32.19 kg/m²     General appearance:  No apparent distress, HEENT:  Normal cephalic, atraumatic without obvious deformity. Neck: Supple, with full range of motion. No jugular venous distention. Trachea midline. Respiratory:  Normal respiratory effort. Clear to auscultation, bilaterally without Rales/Wheezes/Rhonchi. Cardiovascular:  IRREG  Abdomen: Soft, non-tender, non-distended with normal bowel sounds. Musculoskeletal:  No clubbing, cyanosis or edema bilaterally.    Skin: Skin color, texture, turgor normal.  No rashes or lesions. Neurologic:  Neurovascularly intact wCranial nerves: II-XII intact,   Psychiatric:  Alert and oriented, thought content appropriate, normal insight               Labs:   Recent Labs     05/31/22  0833 06/01/22  0631 06/02/22  0704   WBC 9.9 11.9* 14.6*   HGB 11.7 12.1 12.4   HCT 35.1 35.7 36.9   * 151 200     Recent Labs     05/31/22  0833 06/01/22  0631 06/02/22  0703    140 137   K 4.0 3.6 3.9    108* 107   CO2 17* 17* 17*   BUN 8 9 9   CREATININE 0.6 0.6 0.6   CALCIUM 7.9* 8.0* 8.4*     No results for input(s): AST, ALT, BILIDIR, BILITOT, ALKPHOS in the last 72 hours. Recent Labs     05/31/22  0833 06/01/22  0631 06/02/22  0704   INR 3.6 2.6 2.9     Recent Labs     05/31/22  0833   CKTOTAL 406*     No results for input(s): AST, ALT, ALB, BILIDIR, BILITOT, ALKPHOS in the last 72 hours. No results for input(s): LACTA in the last 72 hours.   No results found for: Srikanth Ramírez  No results found for: AMMONIA    Assessment:    Active Hospital Problems    Diagnosis Date Noted    Hypotension [I95.9]      Priority: Medium    Chronic anticoagulation [Z79.01]      Priority: Medium    Chronic CHF (congestive heart failure) ([facility] [address] [I50.9] 05/27/2022     Priority: Medium    Fever of unknown origin [R50.9] 05/27/2022     Priority: Medium    Leukocytosis [D72.829] 05/27/2022     Priority: Medium    Sepsis ([facility] [address] [A41.9] 05/27/2022     Priority: Medium    Hiatal hernia [K44.9] 05/27/2022     Priority: Medium    Adnexal cyst [N94.9] 05/27/2022     Priority: Medium    Elevated troponin [R77.8] 05/27/2022     Priority: Medium    CAD (coronary artery disease) [I25.10] 05/27/2022     Priority: Medium    Syncope and collapse [R55] 05/26/2022     Priority: Medium    Chronic systolic (congestive) heart failure (HCC) [I50.22] 06/26/2020    Chronic atrial fibrillation ([address] [I48.20]     Hyperlipidemia [E78.5] 04/24/2017    Status post coronary artery bypass graft [Z95.1] 04/22/2017   RHABDOMYOLYSIS  ELEVATED TT  CHEST PAIN   H/O CABG   SYNCOPE COLLAPSE  ICM  PAF  LEUKOCYTOSIS   HYPERPYREXIA  TRANSAMINITIS   HLD   STAPH HOMINIS IN BLOOD CUTURE  PLAN:  HYDRATION  COREG   LIPITOR  CHAN   ZESTRIL   ancef         DVT Prophylaxis:   coumadin  Diet: ADULT DIET; Regular; Low Fat/Low Chol/High Fiber/2 gm Na;  No Added Salt (3-4 gm); 1500 ml  Code Status: Full Code     PT/OT Eval Status: *ORDERED     Dispo -[facility]         Electronically signed by Alejandro Armenta DO on 6/2/2022 at 7:06 PM [facility]

## 2022-06-02 NOTE — PROGRESS NOTES
Infectious Disease  Progress Note  [facility]    Chief Complaint: no complaint    Subjective: staph bacteremia  Up in chair. Sitter at bedside. No nausea or diarrhea.  Did not eat much breakfast   Scheduled Meds:   amLODIPine  5 mg Oral Daily    ceFAZolin  2,000 mg IntraVENous Q8H    metoprolol succinate  25 mg Oral Daily    sodium chloride flush  5-40 mL IntraVENous 2 times per day    atorvastatin  40 mg Oral Nightly    docusate sodium  100 mg Oral BID    [Held by provider] furosemide  20 mg Oral Daily    [Held by provider] lisinopril  5 mg Oral Daily    pantoprazole  40 mg Oral QAM AC    warfarin placeholder: dosing by pharmacy   Other RX Placeholder     Continuous Infusions:   sodium chloride      sodium chloride 75 mL/hr at 06/02/22 0706     PRN Meds:sodium chloride flush, sodium chloride, ondansetron **OR** ondansetron, acetaminophen **OR** acetaminophen, bisacodyl, perflutren lipid microspheres, acetaminophen    Patient Vitals for the past 24 hrs:   BP Temp Temp src Pulse Resp SpO2   06/02/22 0048 (!) 164/92 98.6 °F (37 °C) Oral 96 19 93 %   06/01/22 2100 (!) 168/92 97.8 °F (36.6 °C) Temporal 88 16 98 %   06/01/22 1600 (!) 140/72 97.6 °F (36.4 °C) Oral 83 18 97 %   06/01/22 1049 (!) 160/78 98.4 °F (36.9 °C) Oral 64 -- 98 %   06/01/22 0904 (!) 130/56 -- -- 77 24 91 %       CBC with Differential:    Lab Results   Component Value Date    WBC 11.9 06/01/2022    RBC 3.92 06/01/2022    HGB 12.1 06/01/2022    HCT 35.7 06/01/2022     06/01/2022    MCV 91.1 06/01/2022    MCH 30.9 06/01/2022    MCHC 33.9 06/01/2022    RDW 13.1 06/01/2022    LYMPHOPCT 19.2 05/27/2022    MONOPCT 4.4 05/27/2022    BASOPCT 0.2 05/27/2022    MONOSABS 0.74 05/27/2022    LYMPHSABS 3.26 05/27/2022    EOSABS 0.01 05/27/2022    BASOSABS 0.04 05/27/2022     CMP:    Lab Results   Component Value Date     06/02/2022    K 3.9 06/02/2022    K 4.1 05/27/2022     06/02/2022    CO2 17 06/02/2022    BUN 9 06/02/2022    CREATININE 0.6 06/02/2022    GFRAA >60 06/02/2022    LABGLOM >60 06/02/2022    GLUCOSE 151 06/02/2022    PROT 6.6 05/27/2022    LABALBU 3.7 05/27/2022    CALCIUM 8.4 06/02/2022    BILITOT 2.0 05/27/2022    ALKPHOS 99 05/27/2022    AST 43 05/27/2022    ALT 17 05/27/2022       BP (!) 164/92   Pulse 96   Temp 98.6 °F (37 °C) (Oral)   Resp 19   Ht 5' 2\" (1.575 m)   Wt 175 lb 0.5 oz (79.4 kg)   SpO2 93%   BMI 32.01 kg/m²     Physical Exam  Const/Neuro- unchanged, no signs of acute distress, Alert  ENMT- Within Normal Limits, Normocephalic, mucous membranes pink/moist, No thrush  Neck: Neck supple  Heart- Regular, Rate, Rhythm- no murmur appreciated. Lungs- clear to ascultation. Respirations even and nonlabored. Abdomen- Soft, bowel sounds positive, non tender  Musculo/Extremities-  Equal and symmetrical, no edema. No tenderness. Skin:  Warm and dry, free from rashes. Cultures reviewed    Radiology reviewed  CT HEAD WO CONTRAST   Final Result   CT HEAD WITHOUT CONTRAST:      1. No skull fracture or acute intracranial abnormality. CT CERVICAL SPINE WITHOUT CONTRAST:      1. No fracture or joint dislocation is seen. 2. Degenerative changes, as described. RECOMMENDATIONS:   Unavailable         CT CERVICAL SPINE WO CONTRAST   Final Result   CT HEAD WITHOUT CONTRAST:      1. No skull fracture or acute intracranial abnormality. CT CERVICAL SPINE WITHOUT CONTRAST:      1. No fracture or joint dislocation is seen. 2. Degenerative changes, as described. RECOMMENDATIONS:   Unavailable         CT CHEST W CONTRAST   Final Result   1. Large hiatal hernia   2. No pulmonary infiltrate or pleural effusion   3. Cardiomegaly and atherosclerotic disease      RECOMMENDATIONS:   Unavailable         CT ABDOMEN PELVIS W IV CONTRAST Additional Contrast? None   Final Result   1. Moderate to large hiatal hernia. 2. Cholelithiasis without evidence of acute cholecystitis.    3. Probable degenerating fibroids within the uterus. 4. Right adnexal cyst measures approximately 3.7 cm in size. This is a simple   appearing cyst. Recommend follow-up ultrasound in 6-12 months. XR CHEST 1 VIEW   Final Result   No acute process.              Assessment  Fall at home   U/a Neg   Ct abd pelvis Cholelithiasis   Degenerating fibroids   Right adnexal cyst   CT chest neg   CXR neg   Meth suscept staph hominis  Spp homins  Change to ancef   2D echo no evidence of vegetations  Cardiology note appreciated    No evidence of endocarditis  seever TR  Mod PH  Suggest 14 days total of iv antibiotics  With ancef   No source for staph       Plan  Continue ancef  Fever and leucocytosis -resolved  Positive Bc   Staph species-hominis  Repeat blood cultures no growth to date  Will follow   I have made my recommendations and reconciled            Electronically signed by Alvarez Barber MD on 6/2/2022 at 8:23 AM

## 2022-06-03 LAB
ANION GAP SERPL CALCULATED.3IONS-SCNC: 17 MMOL/L (ref 7–16)
BUN BLDV-MCNC: 7 MG/DL (ref 6–23)
CALCIUM SERPL-MCNC: 8.1 MG/DL (ref 8.6–10.2)
CHLORIDE BLD-SCNC: 107 MMOL/L (ref 98–107)
CO2: 16 MMOL/L (ref 22–29)
CREAT SERPL-MCNC: 0.5 MG/DL (ref 0.5–1)
GFR AFRICAN AMERICAN: >60
GFR NON-AFRICAN AMERICAN: >60 ML/MIN/1.73
GLUCOSE BLD-MCNC: 117 MG/DL (ref 74–99)
HCT VFR BLD CALC: 35.4 % (ref 34–48)
HEMOGLOBIN: 11.7 G/DL (ref 11.5–15.5)
INR BLD: 2.8
MCH RBC QN AUTO: 30.5 PG (ref 26–35)
MCHC RBC AUTO-ENTMCNC: 33.1 % (ref 32–34.5)
MCV RBC AUTO: 92.4 FL (ref 80–99.9)
PDW BLD-RTO: 13.7 FL (ref 11.5–15)
PLATELET # BLD: 181 E9/L (ref 130–450)
PMV BLD AUTO: 10.4 FL (ref 7–12)
POTASSIUM SERPL-SCNC: 3.6 MMOL/L (ref 3.5–5)
PROTHROMBIN TIME: 30.1 SEC (ref 9.3–12.4)
RBC # BLD: 3.83 E12/L (ref 3.5–5.5)
SODIUM BLD-SCNC: 140 MMOL/L (ref 132–146)
WBC # BLD: 14.2 E9/L (ref 4.5–11.5)

## 2022-06-03 PROCEDURE — 2580000003 HC RX 258: Performed by: INTERNAL MEDICINE

## 2022-06-03 PROCEDURE — 85027 COMPLETE CBC AUTOMATED: CPT

## 2022-06-03 PROCEDURE — 6370000000 HC RX 637 (ALT 250 FOR IP): Performed by: INTERNAL MEDICINE

## 2022-06-03 PROCEDURE — 6370000000 HC RX 637 (ALT 250 FOR IP): Performed by: NURSE PRACTITIONER

## 2022-06-03 PROCEDURE — 85610 PROTHROMBIN TIME: CPT

## 2022-06-03 PROCEDURE — 6360000002 HC RX W HCPCS: Performed by: INTERNAL MEDICINE

## 2022-06-03 PROCEDURE — 36415 COLL VENOUS BLD VENIPUNCTURE: CPT

## 2022-06-03 PROCEDURE — 80048 BASIC METABOLIC PNL TOTAL CA: CPT

## 2022-06-03 PROCEDURE — 2060000000 HC ICU INTERMEDIATE R&B

## 2022-06-03 RX ORDER — METOPROLOL SUCCINATE 50 MG/1
50 TABLET, EXTENDED RELEASE ORAL DAILY
Status: DISCONTINUED | OUTPATIENT
Start: 2022-06-03 | End: 2022-06-04 | Stop reason: HOSPADM

## 2022-06-03 RX ORDER — WARFARIN SODIUM 2.5 MG/1
2.5 TABLET ORAL
Status: COMPLETED | OUTPATIENT
Start: 2022-06-03 | End: 2022-06-03

## 2022-06-03 RX ORDER — POTASSIUM CHLORIDE 20 MEQ/1
20 TABLET, EXTENDED RELEASE ORAL ONCE
Status: COMPLETED | OUTPATIENT
Start: 2022-06-03 | End: 2022-06-03

## 2022-06-03 RX ADMIN — ATORVASTATIN CALCIUM 40 MG: 40 TABLET, FILM COATED ORAL at 19:50

## 2022-06-03 RX ADMIN — AMLODIPINE BESYLATE 10 MG: 10 TABLET ORAL at 11:25

## 2022-06-03 RX ADMIN — PANTOPRAZOLE SODIUM 40 MG: 40 TABLET, DELAYED RELEASE ORAL at 05:01

## 2022-06-03 RX ADMIN — DOCUSATE SODIUM 100 MG: 100 CAPSULE, LIQUID FILLED ORAL at 19:50

## 2022-06-03 RX ADMIN — METOPROLOL SUCCINATE 50 MG: 50 TABLET, EXTENDED RELEASE ORAL at 11:26

## 2022-06-03 RX ADMIN — CEFAZOLIN 2000 MG: 2 INJECTION, POWDER, FOR SOLUTION INTRAMUSCULAR; INTRAVENOUS at 13:07

## 2022-06-03 RX ADMIN — SODIUM CHLORIDE, PRESERVATIVE FREE 100 UNITS: 5 INJECTION INTRAVENOUS at 11:25

## 2022-06-03 RX ADMIN — SODIUM CHLORIDE, PRESERVATIVE FREE 10 ML: 5 INJECTION INTRAVENOUS at 11:27

## 2022-06-03 RX ADMIN — DOCUSATE SODIUM 100 MG: 100 CAPSULE, LIQUID FILLED ORAL at 11:24

## 2022-06-03 RX ADMIN — CEFAZOLIN 2000 MG: 2 INJECTION, POWDER, FOR SOLUTION INTRAMUSCULAR; INTRAVENOUS at 05:01

## 2022-06-03 RX ADMIN — SODIUM CHLORIDE, PRESERVATIVE FREE 10 ML: 5 INJECTION INTRAVENOUS at 19:50

## 2022-06-03 RX ADMIN — SODIUM CHLORIDE, PRESERVATIVE FREE 100 UNITS: 5 INJECTION INTRAVENOUS at 19:51

## 2022-06-03 RX ADMIN — WARFARIN SODIUM 2.5 MG: 2.5 TABLET ORAL at 17:13

## 2022-06-03 RX ADMIN — POTASSIUM CHLORIDE 20 MEQ: 20 TABLET, EXTENDED RELEASE ORAL at 11:26

## 2022-06-03 RX ADMIN — CEFAZOLIN 2000 MG: 2 INJECTION, POWDER, FOR SOLUTION INTRAMUSCULAR; INTRAVENOUS at 19:50

## 2022-06-03 NOTE — PROGRESS NOTES
Infectious Disease  Progress Note  [facility]    Chief Complaint: MS SH bacteremia    Subjective:  She is doing well. No new complaints. She was cleaning up herself this morning. No diarrhea or skin wounds. ICM site is well. Scheduled Meds:   potassium chloride  20 mEq Oral Once    metoprolol succinate  50 mg Oral Daily    warfarin  2.5 mg Oral Once    amLODIPine  10 mg Oral Daily    lidocaine  5 mL IntraDERmal Once    sodium chloride flush  5-40 mL IntraVENous 2 times per day    heparin flush  1 mL IntraVENous 2 times per day    ceFAZolin  2,000 mg IntraVENous Q8H    atorvastatin  40 mg Oral Nightly    docusate sodium  100 mg Oral BID    [Held by provider] furosemide  20 mg Oral Daily    [Held by provider] lisinopril  5 mg Oral Daily    pantoprazole  40 mg Oral QAM AC    warfarin placeholder: dosing by pharmacy   Other RX Placeholder     Continuous Infusions:   sodium chloride      sodium chloride 75 mL/hr at 06/02/22 1015     PRN Meds:sodium chloride flush, sodium chloride, heparin flush, ondansetron **OR** ondansetron, acetaminophen **OR** acetaminophen, bisacodyl, perflutren lipid microspheres, acetaminophen    ROS:  As mentioned in subjective, all other systems negative      BP (!) 172/92   Pulse 81   Temp (!) 96.6 °F (35.9 °C) (Temporal)   Resp 19   Ht 5' 2\" (1.575 m)   Wt 176 lb (79.8 kg)   SpO2 95%   BMI 32.19 kg/m²     Physical Exam  Const/Neuro- unchanged, no signs of acute distress, Alert  ENMT- Within Normal Limits, Normocephalic, mucous membranes pink/moist, No thrush  Neck: Neck supple  Heart- Regular, Rate, Rhythm- no murmur appreciated. Lungs- clear to ascultation. Respirations even and nonlabored. ICM site: clean  Abdomen- Soft, bowel sounds positive, non tender  Musculo/Extremities-  Equal and symmetrical, no edema. No tenderness. Neurological- No focal motor or sensory loss. No confusion  Skin:  Warm and dry, free from rashes.     Lines: mid line left UE    Labs, Cultures reviewed  Radiology reviewed     CHAN: Summary   Left ventricle was not well visualized. Right ventricle global systolic function is mildly reduced . No evidence of thrombus within left atrium/ left atrial appendage. Emptying velocity is low at 31 cms/s. No evidence of thrombus or mass in the right atrium. Mild focal atherosclerosis in the descending aorta. Severe tricuspid regurgitation. The septal leaflet appears to be tethered. No evidence of infective endocarditis. Moderate pulmonary hypertension with a PASP of 57 mm Hg. Assessment  · Staphylococcus hominis bacteremia: unclear source   No sign of endocarditis  ICM in place  · Leucocytosis: unclear source       Plan  Continue IV cefazolin 2gr q8 till 6/9/2022  meds reconciled by Dr. Eliud Martinez.    Will follow with you    Electronically signed by Destiny Tran MD on 6/3/2022 at 10:19 AM

## 2022-06-03 NOTE — DISCHARGE INSTR - COC
Continuity of Care Form    Patient Name: Doe Weaver   :  1945  MRN:  [id number]    Admit date:  2022  Discharge date:  ***    Code Status Order: Full Code   Advance Directives:      Admitting Physician:  No admitting provider for patient encounter. PCP: Melissa Silva MD    Discharging Nurse: [facility] Unit/Room#: 0516/0805-N  Discharging Unit Phone Number: ***    Emergency Contact:   Extended Emergency Contact Information  Primary Emergency Contact: Syed BallardM[place], [place]  Home Phone: [phone]  Mobile Phone: [phone]  Relation: Niece/Nephew    Past Surgical History:  Past Surgical History:   Procedure Laterality Date    CARDIAC CATHETERIZATION  2017    Dr. Surjit Tillman  2017    DIAGNOSTIC CARDIAC CATH LAB PROCEDURE  2017    Dr. Jo Harry       Immunization History: There is no immunization history on file for this patient.     Active Problems:  Patient Active Problem List   Diagnosis Code    NSTEMI (non-ST elevated myocardial infarction) ([facility] [address] I21.4    Status post coronary artery bypass graft Z95.1    Ischemic cardiomyopathy W07.3    Acute systolic congestive heart failure (HCC) I50.21    Obesity E66.9    Hyperlipidemia E78.5    UGIB (upper gastrointestinal bleed) K92.2    Acute on chronic systolic heart failure (HCC) I50.23    Atrial fibrillation with RVR ([facility]) I48.91    Chronic systolic (congestive) heart failure ([facility]) I50.22    Chest pain R07.9    Chronic atrial fibrillation ([facility] [address] I48.20    Nonrheumatic mitral valve regurgitation I34.0    Nonrheumatic tricuspid valve regurgitation I36.1    Coronary artery disease involving native coronary artery of native heart without angina pectoris I25.10    Hyperbilirubinemia E80.6    Syncope and collapse R55    Chronic CHF (congestive heart failure) ([facility]) I50.9    Fever of unknown origin R50.9    Leukocytosis D72.829    Sepsis ([facility] [address] A41.9    Hiatal hernia K44.9 Adnexal cyst N94.9    Elevated troponin R77.8    CAD (coronary artery disease) I25.10    Hypotension I95.9    Chronic anticoagulation Z79.01       Isolation/Infection:   Isolation            No Isolation          Patient Infection Status       Infection Onset Added Last Indicated Last Indicated By Review Planned Expiration Resolved Resolved By    None active    Resolved    COVID-19 (Rule Out) 05/26/22 05/26/22 05/26/22 COVID-19, Rapid (Ordered)   05/26/22 Rule-Out Test Resulted            Nurse Assessment:  Last Vital Signs: BP (!) 172/92   Pulse 81   Temp (!) 96.6 °F (35.9 °C) (Temporal)   Resp 19   Ht 5' 2\" (1.575 m)   Wt 176 lb (79.8 kg)   SpO2 95%   BMI 32.19 kg/m²     Last documented pain score (0-10 scale): Pain Level: 0  Last Weight:   Wt Readings from Last 1 Encounters:   06/02/22 176 lb (79.8 kg)     Mental Status:  {IP PT MENTAL STATUS:20030}    IV Access:  { CALEB IV ACCESS:520908516}    Nursing Mobility/ADLs:  Walking   {CHP DME CRLU:663434763}  Transfer  {P DME ZJOA:324351118}  Bathing  {CHP DME TLMA:759394815}  Dressing  {CHP DME YMFT:576382622}  Toileting  {CHP DME TWMH:717720976}  Feeding  {P DME KWDS:207825840}  Med Admin  {P DME KWUI:632440799}  Med Delivery   {[facility] MED Delivery:769516157}    Wound Care Documentation and Therapy:        Elimination:  Continence: Bowel: {YES / MW:57598}  Bladder: {YES / HO:03755}  Urinary Catheter: {Urinary Catheter:900252449}   Colostomy/Ileostomy/Ileal Conduit: {YES / QJ:74043}       Date of Last BM: ***    Intake/Output Summary (Last 24 hours) at 6/3/2022 1027  Last data filed at 6/3/2022 1015  Gross per 24 hour   Intake 1385 ml   Output --   Net 1385 ml     I/O last 3 completed shifts: In: 2995.5 [P.O.:960; I.V.:2015.5;  Other:20]  Out: 650 [Urine:650]    Safety Concerns:     [address] Safety Concerns:936801378}    Impairments/Disabilities:      [address] Impairments/Disabilities:664236967}    Nutrition Therapy:  Current Nutrition Therapy:   [address] Diet List:886456389}    Routes of Feeding: {CHP DME Other Feedings:608371139}  Liquids: {Slp liquid thickness:88352}  Daily Fluid Restriction: {CHP DME Yes amt example:938983333}  Last Modified Barium Swallow with Video (Video Swallowing Test): {Done Not Done WTGJ:488394960}    Treatments at the Time of Hospital Discharge:   Respiratory Treatments: ***  Oxygen Therapy:  {Therapy; copd oxygen:35114}  Ventilator:    { CC Vent YMNZ:950241487}    Rehab Therapies: {THERAPEUTIC INTERVENTION:8898224701}  Weight Bearing Status/Restrictions: { CC Weight Bearin}  Other Medical Equipment (for information only, NOT a DME order):  {EQUIPMENT:404793305}  Other Treatments: ***    Patient's personal belongings (please select all that are sent with patient):  {[facility] DME Belongings:619977507}    RN SIGNATURE:  {Esignature:320615234}    CASE MANAGEMENT/SOCIAL WORK SECTION    Inpatient Status Date: ***    Readmission Risk Assessment Score:  Readmission Risk              Risk of Unplanned Readmission:  14           Discharging to Facility/ Agency   Name: [facility]  Address:  Phone:  Fax:    Dialysis Facility (if applicable)   Name:  Address:  Dialysis Schedule:  Phone:  Fax:    / signature: Electronically signed by FELICIANO Kaiser on 2022 at 10:48 AM      PHYSICIAN SECTION    Prognosis: {Prognosis:7825304439}    Condition at Discharge: [facility] Patient Condition:330973243}    Rehab Potential (if transferring to Rehab): {Prognosis:7110546877}    Recommended Labs or Other Treatments After Discharge: ***    Physician Certification: I certify the above information and transfer of Veronica Dennis  is necessary for the continuing treatment of the diagnosis listed and that she requires Suhas Goldberguel for less 30 days.      Update Admission H&P: {CHP DME Changes in HGLUW:695141812}    PHYSICIAN SIGNATURE:  {Esignature:410194653}

## 2022-06-03 NOTE — PLAN OF CARE
Problem: Discharge Planning  Goal: Discharge to home or other facility with appropriate resources  Outcome: Progressing     Problem: Safety - Adult  Goal: Free from fall injury  Outcome: Progressing     Problem: Pain  Goal: Verbalizes/displays adequate comfort level or baseline comfort level  Outcome: Progressing     Problem: Chronic Conditions and Co-morbidities  Goal: Patient's chronic conditions and co-morbidity symptoms are monitored and maintained or improved  Outcome: Progressing     Problem: Cardiovascular - Adult  Goal: Maintains optimal cardiac output and hemodynamic stability  Outcome: Progressing     Problem: Metabolic/Fluid and Electrolytes - Adult  Goal: Hemodynamic stability and optimal renal function maintained  Outcome: Progressing     Problem: Skin/Tissue Integrity  Goal: Absence of new skin breakdown  Description: 1. Monitor for areas of redness and/or skin breakdown  2. Assess vascular access sites hourly  3. Every 4-6 hours minimum:  Change oxygen saturation probe site  4. Every 4-6 hours:  If on nasal continuous positive airway pressure, respiratory therapy assess nares and determine need for appliance change or resting period.   Outcome: Progressing     Problem: ABCDS Injury Assessment  Goal: Absence of physical injury  Outcome: Progressing

## 2022-06-03 NOTE — PROGRESS NOTES
Hospitalist Progress Note      PCP: Lyndsay Merritt MD    Date of Admission: 5/26/2022        Hospital Course:  **69 y. o. female presented with SYNCOPE.  SHE STATES SHE HAD BEEN FINE, AND WENT TO BED AND GOT UP TO GO TO THE BATHROOM AND WHEN SHE WAS LEAVING THE BATHROOM SHE FAINTED, , FELL BACKWARDS, AND WAS ON THE FLOOR FOR ABOUT AND HOUR BEFORE SHE GOT UP AND WAS ABLE TO [facility] FOR HELP.  HAD CHEST PAIN, ACHING IN CHARACTER, LOCATED ACW, NO RADIATING, NO NV, NO DIAPHORESIS, NO SOB.  FOUND TO HAVE STAPH HOMINIS  IN BLOOD CULTURE FOR CHAN** for Suggest 14 days total of iv antibiotics  With ancef  will dc to [facility]** need to be sitter free to return to facility , hopefully tomorrow        Subjective: * wants to go home          Medications:  Reviewed    Infusion Medications    sodium chloride      sodium chloride 75 mL/hr at 06/02/22 1015     Scheduled Medications    metoprolol succinate  50 mg Oral Daily    warfarin  2.5 mg Oral Once    amLODIPine  10 mg Oral Daily    lidocaine  5 mL IntraDERmal Once    sodium chloride flush  5-40 mL IntraVENous 2 times per day    heparin flush  1 mL IntraVENous 2 times per day    ceFAZolin  2,000 mg IntraVENous Q8H    atorvastatin  40 mg Oral Nightly    docusate sodium  100 mg Oral BID    [Held by provider] furosemide  20 mg Oral Daily    [Held by provider] lisinopril  5 mg Oral Daily    pantoprazole  40 mg Oral QAM AC    warfarin placeholder: dosing by pharmacy   Other RX Placeholder     PRN Meds: sodium chloride flush, sodium chloride, heparin flush, ondansetron **OR** ondansetron, acetaminophen **OR** acetaminophen, bisacodyl, perflutren lipid microspheres, acetaminophen      Intake/Output Summary (Last 24 hours) at 6/3/2022 1646  Last data filed at 6/3/2022 1211  Gross per 24 hour   Intake 1025 ml   Output --   Net 1025 ml       Exam:    BP (!) 172/92   Pulse 81   Temp (!) 96.6 °F (35.9 °C) (Temporal)   Resp 19   Ht 5' 2\" (1.575 m)   Wt 176 lb (79.8 kg) SpO2 95%   BMI 32.19 kg/m²       General appearance:  No apparent distress,   HEENT:  Normal cephalic, atraumatic without obvious deformity. Neck: Supple, with full range of motion. Trachea midline. Respiratory:  Normal respiratory effort. Clear to auscultation, bilaterally without Rales/Wheezes/Rhonchi. Cardiovascular:  IRREG  Abdomen: Soft, non-tender, non-distended with normal bowel sounds. Musculoskeletal:  No clubbing, cyanosis or edema bilaterally.    Skin: Skin color, texture, turgor normal.  No rashes or lesions. Neurologic:  Neurovascularly intact wCranial nerves: II-XII intact,   Psychiatric:  Alert and oriented, thought content appropriate               Labs:   Recent Labs     06/01/22  0631 06/02/22  0704 06/03/22  0628   WBC 11.9* 14.6* 14.2*   HGB 12.1 12.4 11.7   HCT 35.7 36.9 35.4    200 181     Recent Labs     06/01/22  0631 06/02/22  0703 06/03/22  0628    137 140   K 3.6 3.9 3.6   * 107 107   CO2 17* 17* 16*   BUN 9 9 7   CREATININE 0.6 0.6 0.5   CALCIUM 8.0* 8.4* 8.1*     No results for input(s): AST, ALT, BILIDIR, BILITOT, ALKPHOS in the last 72 hours. Recent Labs     06/01/22 0631 06/02/22  0704 06/03/22  0628   INR 2.6 2.9 2.8     No results for input(s): Earlis Norman in the last 72 hours. No results for input(s): AST, ALT, ALB, BILIDIR, BILITOT, ALKPHOS in the last 72 hours. No results for input(s): LACTA in the last 72 hours.   No results found for: Adah Ka  No results found for: AMMONIA    Assessment:    Active Hospital Problems    Diagnosis Date Noted    Hypotension [I95.9]      Priority: Medium    Chronic anticoagulation [Z79.01]      Priority: Medium    Chronic CHF (congestive heart failure) ([facility] [address] [I50.9] 05/27/2022     Priority: Medium    Fever of unknown origin [R50.9] 05/27/2022     Priority: Medium    Leukocytosis [D72.829] 05/27/2022     Priority: Medium    Sepsis ([facility] [address] [A41.9] 05/27/2022     Priority: Medium    Hiatal hernia [K44.9] 05/27/2022     Priority: Medium    Adnexal cyst [N94.9] 05/27/2022     Priority: Medium    Elevated troponin [R77.8] 05/27/2022     Priority: Medium    CAD (coronary artery disease) [I25.10] 05/27/2022     Priority: Medium    Syncope and collapse [R55] 05/26/2022     Priority: Medium    Chronic systolic (congestive) heart failure (HCC) [I50.22] 06/26/2020    Chronic atrial fibrillation ([facility] [address] [I48.20]     Hyperlipidemia [E78.5] 04/24/2017    Status post coronary artery bypass graft [Z95.1] 04/22/2017   RHABDOMYOLYSIS  ELEVATED TT  CHEST PAIN   H/O CABG   SYNCOPE COLLAPSE  ICM  PAF  LEUKOCYTOSIS   HYPERPYREXIA  TRANSAMINITIS   HLD   STAPH HOMINIS IN BLOOD CUTURE  PLAN:  HYDRATION  COREG   LIPITOR  CHAN   ZESTRIL   ancef         DVT Prophylaxis:   coumadin  Diet: ADULT DIET; Regular; Low Fat/Low Chol/High Fiber/2 gm Na;  No Added Salt (3-4 gm); 1500 ml  Code Status: Full Code     PT/OT Eval Status: *ORDERED     Dispo - HOME      Electronically signed by Chacorta Sheldon DO on 6/3/2022 at 4:46 PM [facility]

## 2022-06-04 VITALS
DIASTOLIC BLOOD PRESSURE: 84 MMHG | WEIGHT: 188.9 LBS | TEMPERATURE: 97.4 F | HEART RATE: 87 BPM | HEIGHT: 62 IN | SYSTOLIC BLOOD PRESSURE: 167 MMHG | RESPIRATION RATE: 20 BRPM | OXYGEN SATURATION: 96 % | BODY MASS INDEX: 34.76 KG/M2

## 2022-06-04 LAB
ANION GAP SERPL CALCULATED.3IONS-SCNC: 15 MMOL/L (ref 7–16)
BUN BLDV-MCNC: 6 MG/DL (ref 6–23)
CALCIUM SERPL-MCNC: 7.9 MG/DL (ref 8.6–10.2)
CHLORIDE BLD-SCNC: 106 MMOL/L (ref 98–107)
CO2: 17 MMOL/L (ref 22–29)
CREAT SERPL-MCNC: 0.6 MG/DL (ref 0.5–1)
GFR AFRICAN AMERICAN: >60
GFR NON-AFRICAN AMERICAN: >60 ML/MIN/1.73
GLUCOSE BLD-MCNC: 123 MG/DL (ref 74–99)
HCT VFR BLD CALC: 32.1 % (ref 34–48)
HEMOGLOBIN: 10.7 G/DL (ref 11.5–15.5)
INR BLD: 3.6
MCH RBC QN AUTO: 30.9 PG (ref 26–35)
MCHC RBC AUTO-ENTMCNC: 33.3 % (ref 32–34.5)
MCV RBC AUTO: 92.8 FL (ref 80–99.9)
PDW BLD-RTO: 13.7 FL (ref 11.5–15)
PLATELET # BLD: 215 E9/L (ref 130–450)
PMV BLD AUTO: 9.1 FL (ref 7–12)
POTASSIUM SERPL-SCNC: 3.5 MMOL/L (ref 3.5–5)
PROTHROMBIN TIME: 40 SEC (ref 9.3–12.4)
RBC # BLD: 3.46 E12/L (ref 3.5–5.5)
SARS-COV-2, NAAT: NOT DETECTED
SODIUM BLD-SCNC: 138 MMOL/L (ref 132–146)
WBC # BLD: 15.9 E9/L (ref 4.5–11.5)

## 2022-06-04 PROCEDURE — 85027 COMPLETE CBC AUTOMATED: CPT

## 2022-06-04 PROCEDURE — 85610 PROTHROMBIN TIME: CPT

## 2022-06-04 PROCEDURE — 80048 BASIC METABOLIC PNL TOTAL CA: CPT

## 2022-06-04 PROCEDURE — 87635 SARS-COV-2 COVID-19 AMP PRB: CPT

## 2022-06-04 PROCEDURE — 6370000000 HC RX 637 (ALT 250 FOR IP): Performed by: INTERNAL MEDICINE

## 2022-06-04 PROCEDURE — 6360000002 HC RX W HCPCS: Performed by: INTERNAL MEDICINE

## 2022-06-04 PROCEDURE — 2580000003 HC RX 258: Performed by: INTERNAL MEDICINE

## 2022-06-04 PROCEDURE — 6370000000 HC RX 637 (ALT 250 FOR IP): Performed by: NURSE PRACTITIONER

## 2022-06-04 PROCEDURE — 36415 COLL VENOUS BLD VENIPUNCTURE: CPT

## 2022-06-04 RX ORDER — METOPROLOL SUCCINATE 50 MG/1
50 TABLET, EXTENDED RELEASE ORAL DAILY
Qty: 30 TABLET | Refills: 3 | Status: SHIPPED | OUTPATIENT
Start: 2022-06-05

## 2022-06-04 RX ORDER — AMLODIPINE BESYLATE 10 MG/1
10 TABLET ORAL DAILY
Qty: 30 TABLET | Refills: 3 | Status: SHIPPED | OUTPATIENT
Start: 2022-06-05 | End: 2022-08-02

## 2022-06-04 RX ADMIN — METOPROLOL SUCCINATE 50 MG: 50 TABLET, EXTENDED RELEASE ORAL at 10:17

## 2022-06-04 RX ADMIN — CEFAZOLIN 2000 MG: 2 INJECTION, POWDER, FOR SOLUTION INTRAMUSCULAR; INTRAVENOUS at 04:06

## 2022-06-04 RX ADMIN — SODIUM CHLORIDE, PRESERVATIVE FREE 10 ML: 5 INJECTION INTRAVENOUS at 10:17

## 2022-06-04 RX ADMIN — CEFAZOLIN 2000 MG: 2 INJECTION, POWDER, FOR SOLUTION INTRAMUSCULAR; INTRAVENOUS at 14:35

## 2022-06-04 RX ADMIN — PANTOPRAZOLE SODIUM 40 MG: 40 TABLET, DELAYED RELEASE ORAL at 05:44

## 2022-06-04 RX ADMIN — DOCUSATE SODIUM 100 MG: 100 CAPSULE, LIQUID FILLED ORAL at 10:17

## 2022-06-04 RX ADMIN — AMLODIPINE BESYLATE 10 MG: 10 TABLET ORAL at 10:17

## 2022-06-04 RX ADMIN — SODIUM CHLORIDE, PRESERVATIVE FREE 100 UNITS: 5 INJECTION INTRAVENOUS at 10:17

## 2022-06-04 ASSESSMENT — PAIN SCALES - GENERAL: PAINLEVEL_OUTOF10: 0

## 2022-06-04 NOTE — CARE COORDINATION
6/4/2022 social work transition of care planning  Luis Armando notified that pt is ready for discharge. Luis Armando set up [facility](facility to cover) ambulette [phone] for 12-1 pm today to [facility]. Luis Armando notifed nursing,facility liaison, and pt's niece. If pt does not discharge please cx transport and notify pt's niece. Electronically signed by FELICIANO Caceres on 6/4/2022 at 10:54 AM     Addendum: luis armando received call requesting transport be pushed back so physician can see before discharging.  Luis Armando pushed transport back to 3 pm.   Electronically signed by FELICIANO Caceres on 6/4/2022 at 11:05 AM

## 2022-06-04 NOTE — DISCHARGE SUMMARY
Nell [facility]   Discharge summary   Patient ID:  Ziyad Nolan  [id number]  69 y.o.  1945    Admit date: 5/26/2022    Discharge date and time: 6/4/2022    Admission Diagnoses:   Patient Active Problem List   Diagnosis    NSTEMI (non-ST elevated myocardial infarction) ([facility] [address]    Status post coronary artery bypass graft    Ischemic cardiomyopathy    Acute systolic congestive heart failure (HCC)    Obesity    Hyperlipidemia    UGIB (upper gastrointestinal bleed)    Acute on chronic systolic heart failure (HCC)    Atrial fibrillation with RVR (HCC)    Chronic systolic (congestive) heart failure (HCC)    Chest pain    Chronic atrial fibrillation (HCC)    Nonrheumatic mitral valve regurgitation    Nonrheumatic tricuspid valve regurgitation    Coronary artery disease involving native coronary artery of native heart without angina pectoris    Hyperbilirubinemia    Syncope and collapse    [facility]congestive [facility] failure) [facility])    Fever of unknown origin    Leukocytosis    Sepsis (HCC)    Hiatal hernia    Adnexal cyst    Elevated troponin    CAD (coronary artery disease)    Hypotension    Chronic anticoagulation       Discharge Diagnoses: Syncope and collapse    Consults: cardiology and ID    Procedures: CHAN    Hospital Course:     69 y. o. female presented with syncope. She states she had been fine and went to get up out of bed and go to the bathroom when she was leaving the bathroom she fainted. She states she fell backwards and was on the floor for about an hour before she got up and was able to call for help. She had chest pain following the fall. Blood cultures were positive for staph hominis and a CHAN was obtained to rule out endocarditis. PICC line was placed at IDs recommendation for IV Ancef x14 days on discharge.      Is doing well at the time of discharge  Although white count is slightly elevated, she is on ongoing antibiotic therapy per ID       Recent Labs 06/02/22  0704 06/03/22  0628 06/04/22  0547   WBC 14.6* 14.2* 15.9*   HGB 12.4 11.7 10.7*   HCT 36.9 35.4 32.1*    181 215       Recent Labs     06/02/22  0703 06/03/22  0628 06/04/22  0547    140 138   K 3.9 3.6 3.5    107 106   CO2 17* 16* 17*   BUN 9 7 6   CREATININE 0.6 0.5 0.6   CALCIUM 8.4* 8.1* 7.9*       Echo Complete    Result Date: 5/27/2022  Transthoracic Echocardiography Report (TTE)  Demographics   Patient Name    [facility]       Gender            Female                  Shari Domínguez   Medical Record  [id number]      Room Number       7911  Number   Account #       [de-identified]     Procedure Date    05/27/2022   Corporate ID                  Ordering                                Physician   Accession       [id number]    Referring  Number                        Physician   Date of Birth   1945    Sonographer       Davida Villegas   Age             69 year(s)    Interpreting      [address]                                Physician         Physician Cardiology                                                  Jayshree Humphrey MD                                 Any Other  Procedure Type of Study   TTE procedure:Echo Complete W/Doppler & Color Flow. Procedure Date Date: 05/27/2022 Start: 08:38 AM Study Location: ER Technical Quality: Adequate visualization Indications:Syncope. Patient Status: Routine Height: 62 inches Weight: 160 pounds BSA: 1.74 m^2 BMI: 29.26 kg/m^2 BP: 97/43 mmHg Allergies   - No known allergies. Findings   Left Ventricle  Left ventricle size is normal.  Normal left ventricular wall thickness. Ejection fraction is visually estimated at 50-55%. Septal motion consistent with post bypass surgery. Indeterminate diastolic function. No evidence of left ventricular mass or thrombus noted. Right Ventricle  Normal right ventricular size and function. Left Atrium  The left atrium is mildly dilated. Interatrial septum appears intact.    Right Atrium  Mildly enlarged right atrium size. Mitral Valve  Mild mitral annular calcification. No evidence of mitral regurgitaton. Mild mitral stenosis. Tricuspid Valve  The tricuspid valve appears structurally normal.  Mild to moderate tricuspid regurgitation. RVSP is 48 mmHg. Pulmonary hypertension is mild to moderate . Aortic Valve  Aortic valve opens well. The aortic valve appears mildly sclerotic. No  evidence of aortic valve regurgitation. No hemodynamically significant  aortic stenosis is present. Pulmonic Valve  The pulmonic valve was not well visualized. No evidence of any pulmonic regurgitation. Pericardial Effusion  No evidence of pericardial effusion. Aorta  Aortic root within normal limits. Conclusions   Summary  Compared to prior echo, changes noted. Technically adequate study. Left ventricle size is normal.  Normal left ventricular wall thickness. Ejection fraction is visually estimated at 50-55%. Septal motion consistent with post bypass surgery. Indeterminate diastolic function. Mild mitral stenosis. Mild to moderate tricuspid regurgitation. RVSP is 48 mmHg. Pulmonary hypertension is mild to moderate .    Signature   ----------------------------------------------------------------  Electronically signed by Renae Hurt MD(Interpreting  physician) on 05/27/2022 08:58 PM  ----------------------------------------------------------------  M-Mode/2D Measurements & Calculations   LV Diastolic    LV Systolic Dimension: 2.4   AV Cusp Separation: 1.7 cmLA  Dimension: 3.3  cm                           Dimension: 4.4 cmAO Root  cm              LV Volume Diastolic: 50.7 ml Dimension: 2.6 cm  LV FS:27.3 %    LV Volume Systolic: 40.7 ml  LV PW           LV EDV/LV EDV Index: 49.7  Diastolic: 0.8  NH/30 SO/Z^7TS ESV/LV ESV  cm              Index: 20.4 ml/12ml/ m^2  Septum          EF Calculated: 39.9 %  Diastolic: 1 cm LV Mass Index: 47 l/min*m^2                                               LA volume/Index: 59.8 ml  LV Mass: 81.07                               /34.35ml/m^2  g               LVOT: 1.8 cm                 RA Area: 20.8 cm^2                                                IVC Expiration: 1.7 cm  Doppler Measurements & Calculations   MV Peak E-Wave:     AV Peak Velocity: 1.52 LVOT Peak Velocity: 0.81 m/s  1.22 m/s            m/s                    LVOT Mean Velocity: 0.56 m/s                      AV Peak Gradient: 9.18 LVOT Peak Gradient: 2.7  MV Peak Gradient: 6 mmHg                   mmHgLVOT Mean Gradient: 1.4  mmHg                AV Mean Velocity: 1.07 mmHg  MV Mean Gradient: 2 m/s                    Estimated RVSP: 48.6 mmHg  mmHg                AV Mean Gradient: 5.1  Estimated RAP:3 mmHg  MV Mean Velocity:   mmHg  0.62 m/s            AV VTI: 30.3 cm  MV P1/2t: 79.7 msec AV Area                TR Velocity:3.38 m/s  MVA by PHT:2.76     (Continuity):1.31 cm^2 TR Gradient:45.62 mmHg  cm^2  MV Area             LVOT VTI: 15.6 cm  (continuity): 1.7  cm^2                Estimated PASP: 48.62                      mmHg  [email or web address] [id number]    CT HEAD WO CONTRAST    Result Date: 5/26/2022  EXAMINATION: CT OF THE HEAD WITHOUT CONTRAST; CT OF THE CERVICAL SPINE WITHOUT CONTRAST 5/26/2022 6:38 pm TECHNIQUE: CT of the head was performed without the administration of intravenous contrast. Automated exposure control, iterative reconstruction, and/or weight based adjustment of the mA/kV was utilized to reduce the radiation dose to as low as reasonably achievable.; CT of the cervical spine was performed without the administration of intravenous contrast. Multiplanar reformatted images are provided for review. Automated exposure control, iterative reconstruction, and/or weight based adjustment of the mA/kV was utilized to reduce the radiation dose to as low as reasonably achievable. COMPARISON: None.  HISTORY: ORDERING SYSTEM PROVIDED HISTORY: HEAD TRAUMA, CLOSED, MILD, GCS >= 13, NO RISK FACTORS, NEURO EXAM NORMAL TECHNOLOGIST PROVIDED HISTORY: Has a \"code stroke\" or \"stroke alert\" been called? ->No Reason for exam:->Syncope Decision Support Exception - unselect if not a suspected or confirmed emergency medical condition->Emergency Medical Condition (MA) What reading provider will be dictating this exam?->CRC FINDINGS: CT OF THE BRAIN: BRAIN/VENTRICLES: No mass effect, edema or hemorrhage is seen. Moderate volume loss is seen in the brain with mild chronic microvascular ischemic changes. No hydrocephalus or extra-axial fluid is seen. ORBITS: The visualized portion of the orbits demonstrate no acute abnormality. SINUSES: The visualized paranasal sinuses and mastoid air cells demonstrate no acute abnormality. SOFT TISSUES/SKULL: No acute abnormality of the visualized skull or soft tissues. CT CERVICAL SPINE: BONES/ALIGNMENT: There is no acute fracture or traumatic malalignment. DEGENERATIVE CHANGES: Severe loss of disc heights at C5-6 and C6-7 with associated endplate osteophytes result in mild central canal stenoses. Facet and uncovertebral joint hypertrophic changes result in to severe grade neural foraminal stenoses at multiple levels. SOFT TISSUES: There is no prevertebral soft tissue swelling. CT HEAD WITHOUT CONTRAST: 1. No skull fracture or acute intracranial abnormality. CT CERVICAL SPINE WITHOUT CONTRAST: 1. No fracture or joint dislocation is seen. 2. Degenerative changes, as described. RECOMMENDATIONS: Unavailable     CT CHEST W CONTRAST    Result Date: 5/26/2022  EXAMINATION: CT OF THE CHEST WITH CONTRAST 5/26/2022 6:38 pm TECHNIQUE: CT of the chest was performed with the administration of intravenous contrast. Multiplanar reformatted images are provided for review.  Automated exposure control, iterative reconstruction, and/or weight based adjustment of the mA/kV was utilized to reduce the radiation dose to as low as reasonably achievable. COMPARISON: None. HISTORY: ORDERING SYSTEM PROVIDED HISTORY: syncope TECHNOLOGIST PROVIDED HISTORY: Reason for exam:->syncope Decision Support Exception - unselect if not a suspected or confirmed emergency medical condition->Emergency Medical Condition (MA) What reading provider will be dictating this exam?->CRC FINDINGS: Mediastinum: Apparent large hiatal hernia with organo-axial malrotation. Cardiomegaly and coronary artery calcifications present with post CABG changes No thoracic a aortic aneurysm or dissection. Lungs/pleura: No acute infiltrate or effusion Upper Abdomen: Evaluated on separate exam Soft Tissues/Bones:  Relatively severe post sternotomy spinal degenerative changes. 1. Large hiatal hernia 2. No pulmonary infiltrate or pleural effusion 3. Cardiomegaly and atherosclerotic disease RECOMMENDATIONS: Unavailable     CT CERVICAL SPINE WO CONTRAST    Result Date: 5/26/2022  EXAMINATION: CT OF THE HEAD WITHOUT CONTRAST; CT OF THE CERVICAL SPINE WITHOUT CONTRAST 5/26/2022 6:38 pm TECHNIQUE: CT of the head was performed without the administration of intravenous contrast. Automated exposure control, iterative reconstruction, and/or weight based adjustment of the mA/kV was utilized to reduce the radiation dose to as low as reasonably achievable.; CT of the cervical spine was performed without the administration of intravenous contrast. Multiplanar reformatted images are provided for review. Automated exposure control, iterative reconstruction, and/or weight based adjustment of the mA/kV was utilized to reduce the radiation dose to as low as reasonably achievable. COMPARISON: None. HISTORY: ORDERING SYSTEM PROVIDED HISTORY: HEAD TRAUMA, CLOSED, MILD, GCS >= 13, NO RISK FACTORS, NEURO EXAM NORMAL TECHNOLOGIST PROVIDED HISTORY: Has a \"code stroke\" or \"stroke alert\" been called? ->No Reason for exam:->Syncope Decision Support Exception - unselect if not a suspected or confirmed process. There is no effusion or pneumothorax. The cardiomediastinal silhouette is without acute process. The osseous structures are without acute process. Postop sternotomy changes are noted. No acute process. Discharge Exam:    HEENT: NCAT,  PERRLA, No JVD  Heart:  RRR, no murmurs, gallops, or rubs. Lungs:  CTA bilaterally, no wheeze, rales or rhonchi  Abd: bowel sounds present, nontender, nondistended, no masses  Extrem:  No clubbing, cyanosis, or edema    Disposition: [facility]     Patient Condition at Discharge: Stable    Patient Instructions:      Medication List      START taking these medications    amLODIPine 10 MG tablet  Commonly known as: NORVASC  Take 1 tablet by mouth daily  Start taking on: June 5, 2022     ceFAZolin 1 g injection  Commonly known as: ANCEF  Infuse 2,000 mg intravenously every 8 hours for 8 days     metoprolol succinate 50 MG extended release tablet  Commonly known as: TOPROL XL  Take 1 tablet by mouth daily  Start taking on: June 5, 2022        CHANGE how you take these medications    warfarin 5 MG tablet  Commonly known as: COUMADIN  TAKE 1 TABLET BY MOUTH EVERY DAY  What changed: See the new instructions.         CONTINUE taking these medications    atorvastatin 40 MG tablet  Commonly known as: LIPITOR  Take 1 tablet by mouth daily     docusate sodium 100 mg capsule  Commonly known as: COLACE     furosemide 20 MG tablet  Commonly known as: LASIX  TAKE 1 TABLET BY MOUTH EVERY DAY     lisinopril 2.5 MG tablet  Commonly known as: PRINIVIL;ZESTRIL  TAKE 2 TABLETS BY MOUTH EVERY DAY        STOP taking these medications    carvedilol 12.5 MG tablet  Commonly known as: COREG           Where to Get Your Medications      These medications were sent to [facility]/pharmacy [address] [place] [address], [place] [facility] [address]    Phone: [phone]   · amLODIPine 10 MG tablet  · metoprolol succinate 50 MG extended release tablet     You can get these medications from any pharmacy    Bring a paper prescription for each of these medications  · ceFAZolin 1 g injection       Activity: activity as tolerated  Diet: regular diet    Pt has been advised to: Follow-up with Dawson Padilla MD in 1 week. Follow-up with consultants as recommended by them    Note that over 30 minutes was spent in preparing discharge papers, discussing discharge with patient, medication review, etc.    Signed:  KEIRA Simeon CNP  6/4/2022  1:44 PM     Above note edited to reflect my thoughts     I personally saw, examined and provided care for the patient. Radiographs, labs and medication list were reviewed by me independently. The case was discussed in detail and plans for care were established. Review of ALEXX Simeon   , documentation was conducted and revisions were made as appropriate directly by me. I agree with the above documented exam, problem list, and plan of care.      Antwan Lake MD  4:02 PM  6/4/2022

## 2022-06-04 NOTE — PLAN OF CARE
Problem: Discharge Planning  Goal: Discharge to home or other facility with appropriate resources  Outcome: Completed     Problem: Safety - Adult  Goal: Free from fall injury  Outcome: Completed  Flowsheets (Taken 6/4/2022 3503 by Divina Belle RN)  Free From Fall Injury: Instruct family/caregiver on patient safety     Problem: Pain  Goal: Verbalizes/displays adequate comfort level or baseline comfort level  Outcome: Completed     Problem: Chronic Conditions and Co-morbidities  Goal: Patient's chronic conditions and co-morbidity symptoms are monitored and maintained or improved  Outcome: Completed     Problem: Cardiovascular - Adult  Goal: Maintains optimal cardiac output and hemodynamic stability  Outcome: Completed  Goal: Absence of cardiac dysrhythmias or at baseline  Outcome: Completed     Problem: Metabolic/Fluid and Electrolytes - Adult  Goal: Hemodynamic stability and optimal renal function maintained  Outcome: Completed     Problem: Skin/Tissue Integrity  Goal: Absence of new skin breakdown  Description: 1. Monitor for areas of redness and/or skin breakdown  2. Assess vascular access sites hourly  3. Every 4-6 hours minimum:  Change oxygen saturation probe site  4. Every 4-6 hours:  If on nasal continuous positive airway pressure, respiratory therapy assess nares and determine need for appliance change or resting period.   Outcome: Completed     Problem: ABCDS Injury Assessment  Goal: Absence of physical injury  Outcome: Completed     Problem: Infection - Adult  Goal: Absence of infection at discharge  Outcome: Completed  Goal: Absence of infection during hospitalization  Outcome: Completed  Goal: Absence of fever/infection during anticipated neutropenic period  Outcome: Completed

## 2022-06-04 NOTE — DISCHARGE INSTR - DIET

## 2022-06-04 NOTE — PROGRESS NOTES
Pharmacy Consultation Note  (Warfarin Dosing and Monitoring)  Initial consult date: 5-  Consulting Provider: ALEXX Johnson Degree is a 69 y.o. female, 157.5 cm, 72.6 kg for whom pharmacy has been asked to manage warfarin therapy. TSH:    Lab Results   Component Value Date    TSH 4.630 02/14/2020       Hepatic Function Panel:                            Lab Results   Component Value Date    ALKPHOS 99 05/27/2022    ALT 17 05/27/2022    AST 43 05/27/2022    PROT 6.6 05/27/2022    BILITOT 2.0 05/27/2022    BILIDIR 0.4 05/27/2022    IBILI 1.6 05/27/2022    LABALBU 3.7 05/27/2022       Current warfarin drug-drug interactions include: No significant interactions    Recent Labs     06/02/22  0704 06/03/22  0628 06/04/22  0547   HGB 12.4 11.7 10.7*    181 215     Date Warfarin Dose INR Heparin or LMWH Comment   5/26 UTD 2.8 --    5/27 2.5 mg 2.7 stop enoxaparin order Vancomycin   5/28  5 mg  2.1 -- Vancomycin   5/29 5 mg  2.2 -- Cefazolin   5/30 HOLD 3.3 -- Cefazolin   5/31 HOLD 3.6 -- Cefazolin   6/1 2.5 mg 2.6 -- Cefazolin   6/2 2.5 mg 2.9 -- Cefazolin   6/3 2.5 mg 2.8 -- Cefazolin    6/4 NONE 3.6 -- Cefazolin     Assessment:  · 78 yo/F admitted from home d/t syncopal episode, fall (hit head), and LOC x~2 hr PTA. · On warfarin PTA for Hx of AF. Home dose = 2.5 mg once daily per PCP since Sep 2021 per Med Rec; Cards office visit note from Jan 2022 lists warfarin as 5 mg once daily. · Goal INR 2 - 3  · INR 3.6 today    Plan:  · NO warfarin tonight  · Daily PT/INR until the INR is stable within the therapeutic range. · Pharmacist will follow and monitor/adjust dosing as necessary.     Floyd Schaumann, PharmD  Pharmacy Resident  P: [phone]   6/4/2022 8:09 AM

## 2022-06-04 NOTE — PROGRESS NOTES
Infectious Disease  Progress Note  [facility]    Chief Complaint: MS STU bacteremia    Subjective:  She is doing well. No new complaints. She was cleaning up herself this morning. No diarrhea or skin wounds. ICM site is well. Scheduled Meds:   metoprolol succinate  50 mg Oral Daily    amLODIPine  10 mg Oral Daily    lidocaine  5 mL IntraDERmal Once    sodium chloride flush  5-40 mL IntraVENous 2 times per day    heparin flush  1 mL IntraVENous 2 times per day    ceFAZolin  2,000 mg IntraVENous Q8H    atorvastatin  40 mg Oral Nightly    docusate sodium  100 mg Oral BID    [Held by provider] furosemide  20 mg Oral Daily    [Held by provider] lisinopril  5 mg Oral Daily    pantoprazole  40 mg Oral QAM AC    warfarin placeholder: dosing by pharmacy   Other RX Placeholder     Continuous Infusions:   sodium chloride      sodium chloride 75 mL/hr at 06/02/22 1015     PRN Meds:sodium chloride flush, sodium chloride, heparin flush, ondansetron **OR** ondansetron, acetaminophen **OR** acetaminophen, bisacodyl, perflutren lipid microspheres, acetaminophen    ROS:  As mentioned in subjective, all other systems negative      BP (!) 124/56   Pulse 86   Temp 97.5 °F (36.4 °C) (Temporal)   Resp 20   Ht 5' 2\" (1.575 m)   Wt 188 lb 14.4 oz (85.7 kg)   SpO2 95%   BMI 34.55 kg/m²     Physical Exam  Const/Neuro- unchanged, no signs of acute distress, Alert  ENMT- Within Normal Limits, Normocephalic, mucous membranes pink/moist, No thrush  Neck: Neck supple  Heart- Regular, Rate, Rhythm- no murmur appreciated. Lungs- clear to ascultation. Respirations even and nonlabored. ICM site: clean  Abdomen- Soft, bowel sounds positive, non tender  Musculo/Extremities-  Equal and symmetrical, no edema. No tenderness. Neurological- No focal motor or sensory loss. No confusion  Skin:  Warm and dry, free from rashes.     Lines: mid line left UE    Labs, Cultures reviewed  Radiology reviewed     CHAN: Summary   Left ventricle was not well visualized. Right ventricle global systolic function is mildly reduced . No evidence of thrombus within left atrium/ left atrial appendage. Emptying velocity is low at 31 cms/s. No evidence of thrombus or mass in the right atrium. Mild focal atherosclerosis in the descending aorta. Severe tricuspid regurgitation. The septal leaflet appears to be tethered. No evidence of infective endocarditis. Moderate pulmonary hypertension with a PASP of 57 mm Hg. Assessment  · Staphylococcus hominis bacteremia: unclear source   No sign of endocarditis  ICM in place  · Leucocytosis: unclear source       Plan  Continue IV cefazolin 2gr q8 till 6/9/2022  meds reconciled by Dr. Ragini Ross. Ready to get d/isabel today.        Electronically signed by Teresa Barbour MD on 6/4/2022 at 10:03 AM

## 2022-06-07 LAB
ALBUMIN SERPL-MCNC: 3.8 G/DL (ref 3.5–5.2)
ALP BLD-CCNC: 147 U/L (ref 35–104)
ALT SERPL-CCNC: 12 U/L (ref 0–32)
ANION GAP SERPL CALCULATED.3IONS-SCNC: 14 MMOL/L (ref 7–16)
ANISOCYTOSIS: ABNORMAL
AST SERPL-CCNC: 35 U/L (ref 0–31)
BASOPHILS ABSOLUTE: 0.18 E9/L (ref 0–0.2)
BASOPHILS RELATIVE PERCENT: 0.9 % (ref 0–2)
BILIRUB SERPL-MCNC: 1.3 MG/DL (ref 0–1.2)
BUN BLDV-MCNC: 10 MG/DL (ref 6–23)
CALCIUM SERPL-MCNC: 8.5 MG/DL (ref 8.6–10.2)
CHLORIDE BLD-SCNC: 99 MMOL/L (ref 98–107)
CHOLESTEROL, TOTAL: 108 MG/DL (ref 0–199)
CO2: 25 MMOL/L (ref 22–29)
CREAT SERPL-MCNC: 0.6 MG/DL (ref 0.5–1)
EOSINOPHILS ABSOLUTE: 0.35 E9/L (ref 0.05–0.5)
EOSINOPHILS RELATIVE PERCENT: 1.7 % (ref 0–6)
GFR AFRICAN AMERICAN: >60
GFR NON-AFRICAN AMERICAN: >60 ML/MIN/1.73
GLUCOSE BLD-MCNC: 172 MG/DL (ref 74–99)
HCT VFR BLD CALC: 34.7 % (ref 34–48)
HDLC SERPL-MCNC: 33 MG/DL
HEMOGLOBIN: 11.4 G/DL (ref 11.5–15.5)
INR BLD: 2.2
LDL CHOLESTEROL CALCULATED: 56 MG/DL (ref 0–99)
LYMPHOCYTES ABSOLUTE: 2.24 E9/L (ref 1.5–4)
LYMPHOCYTES RELATIVE PERCENT: 11.4 % (ref 20–42)
MCH RBC QN AUTO: 30.5 PG (ref 26–35)
MCHC RBC AUTO-ENTMCNC: 32.9 % (ref 32–34.5)
MCV RBC AUTO: 92.8 FL (ref 80–99.9)
MONOCYTES ABSOLUTE: 1.84 E9/L (ref 0.1–0.95)
MONOCYTES RELATIVE PERCENT: 8.8 % (ref 2–12)
NEUTROPHILS ABSOLUTE: 15.71 E9/L (ref 1.8–7.3)
NEUTROPHILS RELATIVE PERCENT: 77.2 % (ref 43–80)
PDW BLD-RTO: 14.4 FL (ref 11.5–15)
PLATELET # BLD: 309 E9/L (ref 130–450)
PMV BLD AUTO: 8.8 FL (ref 7–12)
POLYCHROMASIA: ABNORMAL
POTASSIUM SERPL-SCNC: 3.2 MMOL/L (ref 3.5–5)
PROTHROMBIN TIME: 23.8 SEC (ref 9.3–12.4)
RBC # BLD: 3.74 E12/L (ref 3.5–5.5)
SODIUM BLD-SCNC: 138 MMOL/L (ref 132–146)
TOTAL PROTEIN: 6.7 G/DL (ref 6.4–8.3)
TRIGL SERPL-MCNC: 96 MG/DL (ref 0–149)
VLDLC SERPL CALC-MCNC: 19 MG/DL
WBC # BLD: 20.4 E9/L (ref 4.5–11.5)

## 2022-06-09 LAB
ALBUMIN SERPL-MCNC: 3.5 G/DL (ref 3.5–5.2)
ALP BLD-CCNC: 130 U/L (ref 35–104)
ALT SERPL-CCNC: 8 U/L (ref 0–32)
ANION GAP SERPL CALCULATED.3IONS-SCNC: 15 MMOL/L (ref 7–16)
ANISOCYTOSIS: ABNORMAL
AST SERPL-CCNC: 31 U/L (ref 0–31)
BASOPHILS ABSOLUTE: 0 E9/L (ref 0–0.2)
BASOPHILS RELATIVE PERCENT: 0.5 % (ref 0–2)
BILIRUB SERPL-MCNC: 1.2 MG/DL (ref 0–1.2)
BUN BLDV-MCNC: 10 MG/DL (ref 6–23)
BURR CELLS: ABNORMAL
C-REACTIVE PROTEIN: 1.9 MG/DL (ref 0–0.4)
CALCIUM SERPL-MCNC: 8.8 MG/DL (ref 8.6–10.2)
CHLORIDE BLD-SCNC: 101 MMOL/L (ref 98–107)
CO2: 21 MMOL/L (ref 22–29)
CREAT SERPL-MCNC: 0.6 MG/DL (ref 0.5–1)
EOSINOPHILS ABSOLUTE: 0.24 E9/L (ref 0.05–0.5)
EOSINOPHILS RELATIVE PERCENT: 1.7 % (ref 0–6)
GFR AFRICAN AMERICAN: >60
GFR NON-AFRICAN AMERICAN: >60 ML/MIN/1.73
GLUCOSE BLD-MCNC: 112 MG/DL (ref 74–99)
HCT VFR BLD CALC: 35.6 % (ref 34–48)
HEMOGLOBIN: 11.5 G/DL (ref 11.5–15.5)
INR BLD: 2.9
LYMPHOCYTES ABSOLUTE: 3.06 E9/L (ref 1.5–4)
LYMPHOCYTES RELATIVE PERCENT: 21.7 % (ref 20–42)
MCH RBC QN AUTO: 30.5 PG (ref 26–35)
MCHC RBC AUTO-ENTMCNC: 32.3 % (ref 32–34.5)
MCV RBC AUTO: 94.4 FL (ref 80–99.9)
MONOCYTES ABSOLUTE: 1.39 E9/L (ref 0.1–0.95)
MONOCYTES RELATIVE PERCENT: 9.6 % (ref 2–12)
NEUTROPHILS ABSOLUTE: 9.31 E9/L (ref 1.8–7.3)
NEUTROPHILS RELATIVE PERCENT: 67 % (ref 43–80)
OVALOCYTES: ABNORMAL
PDW BLD-RTO: 14.5 FL (ref 11.5–15)
PLATELET # BLD: 288 E9/L (ref 130–450)
PMV BLD AUTO: 8.9 FL (ref 7–12)
POTASSIUM SERPL-SCNC: 3.5 MMOL/L (ref 3.5–5)
PROTHROMBIN TIME: 31.6 SEC (ref 9.3–12.4)
RBC # BLD: 3.77 E12/L (ref 3.5–5.5)
SEDIMENTATION RATE, ERYTHROCYTE: 27 MM/HR (ref 0–20)
SODIUM BLD-SCNC: 137 MMOL/L (ref 132–146)
TOTAL PROTEIN: 6.5 G/DL (ref 6.4–8.3)
WBC # BLD: 13.9 E9/L (ref 4.5–11.5)

## 2022-06-10 LAB
INR BLD: 3.4
PROTHROMBIN TIME: 36.6 SEC (ref 9.3–12.4)

## 2022-06-11 LAB
INR BLD: 3.1
PROTHROMBIN TIME: 34.9 SEC (ref 9.3–12.4)
REASON FOR REJECTION: NORMAL
REJECTED TEST: NORMAL

## 2022-06-13 LAB
ALBUMIN SERPL-MCNC: 3.8 G/DL (ref 3.5–5.2)
ALP BLD-CCNC: 157 U/L (ref 35–104)
ALT SERPL-CCNC: 17 U/L (ref 0–32)
ANION GAP SERPL CALCULATED.3IONS-SCNC: 11 MMOL/L (ref 7–16)
ANISOCYTOSIS: ABNORMAL
AST SERPL-CCNC: 35 U/L (ref 0–31)
BASOPHILS ABSOLUTE: 0 E9/L (ref 0–0.2)
BASOPHILS RELATIVE PERCENT: 0 % (ref 0–2)
BILIRUB SERPL-MCNC: 1.6 MG/DL (ref 0–1.2)
BUN BLDV-MCNC: 11 MG/DL (ref 6–23)
CALCIUM SERPL-MCNC: 9.1 MG/DL (ref 8.6–10.2)
CHLORIDE BLD-SCNC: 101 MMOL/L (ref 98–107)
CO2: 24 MMOL/L (ref 22–29)
CREAT SERPL-MCNC: 0.6 MG/DL (ref 0.5–1)
EOSINOPHILS ABSOLUTE: 0.14 E9/L (ref 0.05–0.5)
EOSINOPHILS RELATIVE PERCENT: 1 % (ref 0–6)
GFR AFRICAN AMERICAN: >60
GFR NON-AFRICAN AMERICAN: >60 ML/MIN/1.73
GLUCOSE BLD-MCNC: 114 MG/DL (ref 74–99)
HCT VFR BLD CALC: 36.4 % (ref 34–48)
HEMOGLOBIN: 11.8 G/DL (ref 11.5–15.5)
INR BLD: 2.1
LYMPHOCYTES ABSOLUTE: 9.93 E9/L (ref 1.5–4)
LYMPHOCYTES RELATIVE PERCENT: 73 % (ref 20–42)
MCH RBC QN AUTO: 30.7 PG (ref 26–35)
MCHC RBC AUTO-ENTMCNC: 32.4 % (ref 32–34.5)
MCV RBC AUTO: 94.8 FL (ref 80–99.9)
MONOCYTES ABSOLUTE: 0.14 E9/L (ref 0.1–0.95)
MONOCYTES RELATIVE PERCENT: 1 % (ref 2–12)
NEUTROPHILS ABSOLUTE: 3.4 E9/L (ref 1.8–7.3)
NEUTROPHILS RELATIVE PERCENT: 25 % (ref 43–80)
PDW BLD-RTO: 14.6 FL (ref 11.5–15)
PLATELET # BLD: 294 E9/L (ref 130–450)
PMV BLD AUTO: 9.2 FL (ref 7–12)
POLYCHROMASIA: ABNORMAL
POTASSIUM SERPL-SCNC: 4.1 MMOL/L (ref 3.5–5)
PROTHROMBIN TIME: 22.3 SEC (ref 9.3–12.4)
RBC # BLD: 3.84 E12/L (ref 3.5–5.5)
SODIUM BLD-SCNC: 136 MMOL/L (ref 132–146)
TOTAL PROTEIN: 6.9 G/DL (ref 6.4–8.3)
WBC # BLD: 13.6 E9/L (ref 4.5–11.5)

## 2022-06-16 LAB
ALBUMIN SERPL-MCNC: 3.7 G/DL (ref 3.5–5.2)
ALP BLD-CCNC: 158 U/L (ref 35–104)
ALT SERPL-CCNC: 12 U/L (ref 0–32)
ANION GAP SERPL CALCULATED.3IONS-SCNC: 17 MMOL/L (ref 7–16)
ANISOCYTOSIS: ABNORMAL
AST SERPL-CCNC: 26 U/L (ref 0–31)
BASOPHILS ABSOLUTE: 0 E9/L (ref 0–0.2)
BASOPHILS RELATIVE PERCENT: 0 % (ref 0–2)
BILIRUB SERPL-MCNC: 1.4 MG/DL (ref 0–1.2)
BUN BLDV-MCNC: 11 MG/DL (ref 6–23)
BURR CELLS: ABNORMAL
C-REACTIVE PROTEIN: 0.3 MG/DL (ref 0–0.4)
CALCIUM SERPL-MCNC: 8.8 MG/DL (ref 8.6–10.2)
CHLORIDE BLD-SCNC: 100 MMOL/L (ref 98–107)
CO2: 20 MMOL/L (ref 22–29)
CREAT SERPL-MCNC: 0.7 MG/DL (ref 0.5–1)
EOSINOPHILS ABSOLUTE: 0 E9/L (ref 0.05–0.5)
EOSINOPHILS RELATIVE PERCENT: 0 % (ref 0–6)
GFR AFRICAN AMERICAN: >60
GFR NON-AFRICAN AMERICAN: >60 ML/MIN/1.73
GLUCOSE BLD-MCNC: 80 MG/DL (ref 74–99)
HCT VFR BLD CALC: 38.1 % (ref 34–48)
HEMOGLOBIN: 11.9 G/DL (ref 11.5–15.5)
LYMPHOCYTES ABSOLUTE: 6.8 E9/L (ref 1.5–4)
LYMPHOCYTES RELATIVE PERCENT: 42 % (ref 20–42)
MCH RBC QN AUTO: 30.2 PG (ref 26–35)
MCHC RBC AUTO-ENTMCNC: 31.2 % (ref 32–34.5)
MCV RBC AUTO: 96.7 FL (ref 80–99.9)
MONOCYTES ABSOLUTE: 0.97 E9/L (ref 0.1–0.95)
MONOCYTES RELATIVE PERCENT: 6 % (ref 2–12)
NEUTROPHILS ABSOLUTE: 8.42 E9/L (ref 1.8–7.3)
NEUTROPHILS RELATIVE PERCENT: 52 % (ref 43–80)
PDW BLD-RTO: 14.4 FL (ref 11.5–15)
PLATELET # BLD: 240 E9/L (ref 130–450)
PMV BLD AUTO: 9 FL (ref 7–12)
POTASSIUM SERPL-SCNC: 4 MMOL/L (ref 3.5–5)
RBC # BLD: 3.94 E12/L (ref 3.5–5.5)
SEDIMENTATION RATE, ERYTHROCYTE: 48 MM/HR (ref 0–20)
SMUDGE CELLS: ABNORMAL
SODIUM BLD-SCNC: 137 MMOL/L (ref 132–146)
TOTAL PROTEIN: 6.7 G/DL (ref 6.4–8.3)
WBC # BLD: 16.2 E9/L (ref 4.5–11.5)

## 2022-06-20 ENCOUNTER — TELEPHONE (OUTPATIENT)
Dept: ADMINISTRATIVE | Age: 77
End: 2022-06-20

## 2022-06-20 NOTE — PROGRESS NOTES
Physician Progress Note      PATIENT:               Manav Calero  [facility] #:                  [id number]  :                       1945  ADMIT DATE:       2022 3:52 PM  [address] DATE:        2022 4:01 PM  RESPONDING  PROVIDER #:        FREDO Mcneill MD          QUERY TEXT:    Patient admitted with Syncope. Documentation reflects Sepsis in Active Problem   List .  If possible, please document in the progress notes and discharge   summary if Sepsis was: The medical record reflects the following:  Risk Factors: Chronic Illness Febrile on admission  Clinical Indicators:  22: Per ED: Lumbar puncture attempted with no fluid obtained  Per H&P: Active problem list: Sepsis date noted 22;  22: Per ID: Staphylococcus hominis bacteremia: unclear source. No sign of   endocarditis. Leukocytosis unclear source. Continue IV cefazolin 2 gm q8   till 22:  22: WBC: 14.5; Lactic Acid sepsis 1.3;  Blood cultures: Staphylococcus   Hominis; Temperature 101.2; heart rate 103; BP:  97/52; 98/53  22: WBC: 17.0; Treatment: Infectious Disease consult; Blood cultures; IV Ancef; TTE    Thank You,  Melani aMrcano BSN, R.N.  Clinical Documentation Improvement  [phone]  Options provided:  -- Sepsis with unknown source Present on admission confirmed after study  -- Sepsis with unknown source present on admission treated and resolved  -- Bacteremia Confirmed after study and Sepsis ruled out  -- Sepsis ruled out after study  -- Other - I will add my own diagnosis  -- Disagree - Not applicable / Not valid  -- Disagree - Clinically unable to determine / Unknown  -- Refer to Clinical Documentation Reviewer    PROVIDER RESPONSE TEXT:    Sepsis with unknown source which was present on admission confirmed after   study.     Query created by: Guero Padilla on 2022 1:13 PM      Electronically signed by:  FREDO Mcneill MD 2022 6:51 PM

## 2022-06-20 NOTE — TELEPHONE ENCOUNTER
Please call nimariel, patient needs follow up with Dr Lesley Perea was recently discharged from [address] [place] [address]. 9UTI and confusion) Her medications have been changed and she wants to be sure they are correct.  Please call her and advise Thanks

## 2022-06-26 PROBLEM — R77.8 ELEVATED TROPONIN: Status: RESOLVED | Noted: 2022-05-27 | Resolved: 2022-06-26

## 2022-06-26 PROBLEM — R79.89 ELEVATED TROPONIN: Status: RESOLVED | Noted: 2022-05-27 | Resolved: 2022-06-26

## 2022-07-05 ENCOUNTER — HOSPITAL ENCOUNTER (OUTPATIENT)
Age: 77
Discharge: HOME | End: 2022-07-05
Payer: MEDICARE

## 2022-07-05 PROCEDURE — 87040 BLOOD CULTURE FOR BACTERIA: CPT

## 2022-07-05 PROCEDURE — 36415 COLL VENOUS BLD VENIPUNCTURE: CPT

## 2022-07-08 RX ORDER — POTASSIUM CHLORIDE 1500 MG/1
TABLET, EXTENDED RELEASE ORAL
Qty: 30 TABLET | OUTPATIENT
Start: 2022-07-08

## 2022-07-10 LAB
BLOOD CULTURE, ROUTINE: NORMAL
CULTURE, BLOOD 2: NORMAL

## 2022-07-11 ENCOUNTER — CLINICAL DOCUMENTATION (OUTPATIENT)
Dept: INFECTIOUS DISEASES | Age: 77
End: 2022-07-11

## 2022-07-22 RX ORDER — WARFARIN SODIUM 5 MG/1
TABLET ORAL
Qty: 90 TABLET | Refills: 3 | Status: SHIPPED | OUTPATIENT
Start: 2022-07-22

## 2022-07-22 RX ORDER — AMLODIPINE BESYLATE 10 MG/1
TABLET ORAL
Qty: 30 TABLET | Refills: 3 | OUTPATIENT
Start: 2022-07-22

## 2022-07-22 RX ORDER — METOPROLOL SUCCINATE 50 MG/1
TABLET, EXTENDED RELEASE ORAL
Qty: 30 TABLET | Refills: 3 | OUTPATIENT
Start: 2022-07-22

## 2022-07-29 RX ORDER — FUROSEMIDE 20 MG/1
TABLET ORAL
Qty: 90 TABLET | Refills: 3 | Status: SHIPPED | OUTPATIENT
Start: 2022-07-29

## 2022-08-02 ENCOUNTER — OFFICE VISIT (OUTPATIENT)
Dept: CARDIOLOGY CLINIC | Age: 77
End: 2022-08-02
Payer: MEDICARE

## 2022-08-02 VITALS
SYSTOLIC BLOOD PRESSURE: 112 MMHG | BODY MASS INDEX: 30.73 KG/M2 | HEIGHT: 62 IN | RESPIRATION RATE: 18 BRPM | HEART RATE: 51 BPM | WEIGHT: 167 LBS | DIASTOLIC BLOOD PRESSURE: 60 MMHG

## 2022-08-02 DIAGNOSIS — R55 SYNCOPE AND COLLAPSE: Primary | ICD-10-CM

## 2022-08-02 PROCEDURE — 99214 OFFICE O/P EST MOD 30 MIN: CPT | Performed by: INTERNAL MEDICINE

## 2022-08-02 PROCEDURE — 93000 ELECTROCARDIOGRAM COMPLETE: CPT | Performed by: INTERNAL MEDICINE

## 2022-08-02 PROCEDURE — 1123F ACP DISCUSS/DSCN MKR DOCD: CPT | Performed by: INTERNAL MEDICINE

## 2022-08-02 NOTE — PROGRESS NOTES
[facility] Cardiology Progress Note  Dr. Sheba Arroyo      Referring Physician: Tara Hilton MD  CHIEF COMPLAINT:   Chief Complaint   Patient presents with    Coronary Artery Disease     No c/o        HISTORY OF PRESENT ILLNESS:   Patient is a 69years old female with a history of CAD status post CABG in 2017, atrial fibrillation, cardiomyopathy, chronic systolic congestive heart failure and hyperlipidemia, is here for a follow up visit. Was admitted to the hospital with urosepsis complicated by rhabdomyolysis, discharged from the hospital couple of weeks ago, getting back into normal life  Pedal edema, patient denies any chest pain, no shortness of breath, no lightheadedness, no dizziness, no palpitations, no PND, no orthopnea, no syncope, no presyncopal episodes.   Functional capacity is improving    Past Medical History:   Diagnosis Date    Acute systolic congestive heart failure ([address] 4/24/2017    Atrial fibrillation (HCC)     CAD (coronary artery disease)     History of echocardiogram 04/14/2017    EF 26%    Hyperlipidemia 4/24/2017    Ischemic cardiomyopathy     Non-rheumatic tricuspid valve insufficiency     Nonrheumatic mitral (valve) insufficiency          Past Surgical History:   Procedure Laterality Date    CARDIAC CATHETERIZATION  04/17/2017    Dr. Cindy Harris GRAFT  04/17/2017    DIAGNOSTIC CARDIAC CATH LAB PROCEDURE  04/17/2017    Dr. Loni Johnston         Current Outpatient Medications   Medication Sig Dispense Refill    furosemide (LASIX) 20 MG tablet TAKE 1 TABLET BY MOUTH EVERY DAY 90 tablet 3    warfarin (COUMADIN) 5 MG tablet TAKE 1 TABLET BY MOUTH EVERY DAY (Patient taking differently: Take 3 mg by mouth in the morning.) 90 tablet 3    metoprolol succinate (TOPROL XL) 50 MG extended release tablet Take 1 tablet by mouth daily 30 tablet 3    amLODIPine (NORVASC) 10 MG tablet Take 1 tablet by mouth daily 30 tablet 3    lisinopril (PRINIVIL;ZESTRIL) 2.5 MG tablet TAKE 2 TABLETS BY MOUTH EVERY DAY (Patient taking differently: Take 5 mg by mouth in the morning.) 180 tablet 3    docusate sodium (COLACE) 100 MG capsule Take 100 mg by mouth 2 times daily      atorvastatin (LIPITOR) 40 MG tablet Take 1 tablet by mouth daily 30 tablet 3     No current facility-administered medications for this visit. Allergies as of 2022    (No Known Allergies)       Social History     Socioeconomic History    Marital status:       Spouse name: Not on file    Number of children: Not on file    Years of education: Not on file    Highest education level: Not on file   Occupational History    Not on file   Tobacco Use    Smoking status: Former     Packs/day: 0.50     Years: 20.00     Pack years: 10.00     Types: Cigarettes     Quit date: 2002     Years since quittin.3    Smokeless tobacco: Never   Vaping Use    Vaping Use: Never used   Substance and Sexual Activity    Alcohol use: Not Currently    Drug use: Never    Sexual activity: Never   Other Topics Concern    Not on file   Social History Narrative    Drinks 1 pot of coffee daily     Social Determinants of Health     Financial Resource Strain: Not on file   Food Insecurity: Not on file   Transportation Needs: Not on file   Physical Activity: Not on file   Stress: Not on file   Social Connections: Not on file   Intimate Partner Violence: Not on file   Housing Stability: Not on file       No family Hx for early CAD    REVIEW OF SYSTEMS:   CONSTITUTIONAL:  negative for  fevers, chills, sweats, + fatigue  HEENT:  negative for  tinnitus, earaches, nasal congestion and epistaxis  RESPIRATORY:  negative for  dry cough, cough with sputum, dyspnea, wheezing and hemoptysis  GASTROINTESTINAL:  negative for nausea, vomiting, diarrhea, constipation, pruritus and jaundice  HEMATOLOGIC/LYMPHATIC:  negative for easy bruising, bleeding, lymphadenopathy and petechiae  ENDOCRINE:  negative for heat intolerance, cold intolerance, tremor, hair loss and diabetic symptoms including neither polyuria nor polydipsia nor blurred vision  MUSCULOSKELETAL:  negative for  myalgias, arthralgias, joint swelling, stiff joints and decreased range of motion  NEUROLOGICAL:  negative for memory problems, speech problems, visual disturbance, dysphagia, weakness and numbness      PHYSICAL EXAM:   CONSTITUTIONAL:  awake, alert, cooperative, no apparent distress, and appears stated age  HEAD:  normocepalic, without obvious abnormality, atraumatic, pink, moist mucous membranes. NECK:  Supple, symmetrical, trachea midline, no adenopathy, thyroid symmetric, not enlarged and no tenderness, skin normal  LUNGS:  No increased work of breathing, good air exchange, clear to auscultation bilaterally, no crackles or wheezing  CARDIOVASCULAR:  Normal apical impulse,Irregularly irregular, normal S1 and S2, no S3 , 3/6 systolic murmur at the apex, 3/6 systolic murmur at the left lower sternal border,no JVD, no carotid bruit, + pedal edema, good carotid upstroke bilaterally. ABDOMEN:  Soft, nontender, no masses, no hepatomegaly or splenomegaly, BS+  CHEST: nontender to palpation, expands symmetrically  MUSCULOSKELETAL:  No clubbing no cyanosis. there is no redness, warmth, or swelling of the joints  full range of motion noted  NEUROLOGIC:  Alert, awake,oriented x3  SKIN:  no bruising or bleeding, normal skin color, texture, turgor and no redness, warmth, or swelling    /60   Pulse 51   Resp 18   Ht 5' 2\" (1.575 m)   Wt 167 lb (75.8 kg)   BMI 30.54 kg/m²     DATA:   I personally reviewed the visit EKG with the following interpretation: Atrial fibrillation, with a slow ventricular response, nonspecific interventricular conduction delay, possible old anterior wall MI age undetermined    EKG 1/21/2022, atrial fibrillation, controlled ventricular response, left bundle branch block    EKG 10/8/20 Atrial fibrillation with slow ventricular response, nonspecific intraventricular conduction delay, old inferior wall MI age undetermined       ECHO: 5/27/2022, Summary   Compared to prior echo, changes noted. Technically adequate study. Left ventricle size is normal.   Normal left ventricular wall thickness. Ejection fraction is visually estimated at 50-55%. Septal motion consistent with post bypass surgery. Indeterminate diastolic function. Mild mitral stenosis. Mild to moderate tricuspid regurgitation. RVSP is 48 mmHg. Pulmonary hypertension is mild to moderate . 2/14/20 Summary   No previous echo for comparison. Technically adequate study. Normal left ventricular wall thickness. Ejection fraction is visually estimated at 35%. Abnormal (paradoxical) motion consistent with conduction abnormality. Mild to moderate mitral regurgitation is present. Moderate tricuspid regurgitation.    RVSP is normal.    Angiography: 4/17/17 Findings:  Left main: 70% distal stenosis  LAD: 60% mid stenosis  Circumflex: Luminal irregularities  RCA: 80-90% proximal hazy lesion    CABG 4/20/17 x2, left internal mammary artery to the LAD, saphenous vein graft to the circ posterolateral.     Cardiology Labs: BMP:    Lab Results   Component Value Date/Time     06/16/2022 04:56 AM    K 4.0 06/16/2022 04:56 AM    K 4.1 05/27/2022 04:53 AM     06/16/2022 04:56 AM    CO2 20 06/16/2022 04:56 AM    BUN 11 06/16/2022 04:56 AM    CREATININE 0.7 06/16/2022 04:56 AM     CMP:    Lab Results   Component Value Date/Time     06/16/2022 04:56 AM    K 4.0 06/16/2022 04:56 AM    K 4.1 05/27/2022 04:53 AM     06/16/2022 04:56 AM    CO2 20 06/16/2022 04:56 AM    BUN 11 06/16/2022 04:56 AM    CREATININE 0.7 06/16/2022 04:56 AM    PROT 6.7 06/16/2022 04:56 AM     CBC:    Lab Results   Component Value Date/Time    WBC 16.2 06/16/2022 04:56 AM    RBC 3.94 06/16/2022 04:56 AM    HGB 11.9 06/16/2022 04:56 AM    HCT 38.1 06/16/2022 04:56 AM    MCV 96.7 06/16/2022 04:56 AM    RDW 14.4 06/16/2022 04:56 AM     06/16/2022 04:56 AM     PT/INR:  No results found for: PTINR  PT/INR Warfarin:  No components found for: PTPATWAR, PTINRWAR  PTT:    Lab Results   Component Value Date/Time    APTT 25.9 04/20/2017 11:19 AM     PTT Heparin:  No components found for: APTTHEP  Magnesium:    Lab Results   Component Value Date/Time    MG 2.0 05/26/2022 03:46 PM     TSH:    Lab Results   Component Value Date/Time    TSH 4.630 02/14/2020 07:59 AM     TROPONIN:  No components found for: TROP  BNP:    Lab Results   Component Value Date/Time     04/14/2017 07:40 AM     FASTING LIPID PANEL:    Lab Results   Component Value Date/Time    CHOL 108 06/07/2022 06:20 PM    HDL 33 06/07/2022 06:20 PM    TRIG 96 06/07/2022 06:20 PM     No orders to display     I have personally reviewed the laboratory, cardiac diagnostic and radiographic testing as outlined above:      IMPRESSION:  1. Atrial fibrillation: Persistent,  Rate is controlled, [id number] score of 5, on Coumadin. 2.  Diastolic congestive heart failure: Mildly decompensated, will adjust Lasix dose  3. CAD: Status post CABG in April 2017 with a LIMA to LAD, SVG to left PLV, doing well. 4. Cardiomyopathy: Ischemic, resolved  5. Mild mitral valve regurgitation  6. Tricuspid valve regurgitation: Mild to moderate  7. Mitral valve stenosis: Mild  8. Pulmonary hypertension: Mild. 9.  Hypertension: Controlled  10. Chronic anticoagulation: Stated  [facility] follows with INR    RECOMMENDATIONS:   1. Discontinue amlodipine  2. Increase lisinopril to 5 mg daily  3. Take an extra Lasix for the next couple of days until pedal edema resolved  4. Continue the rest of medications  5. Preventive cardiology: Low-salt, low-cholesterol diet, daily exercise, total cholesterol of less than 200, LDL of less than 70,were all advised. 6.  CHF: Daily weight, take an extra Lasix for weight gain of more than 2-3 pounds in 24 hours, compliance with diuretics, low-salt diet were all advised.   7. Compliance with the Coumadin dose, PT and INR check appointments was strongly advised  8. Increase risk of bleeding due to being on anti-coagulation, symptoms and signs of bleeding discussed with patient, patient was advised to seek medical attention at the earliest symptoms or signs of bleeding. 9.  Follow-up with Dr. Alberto Duncan as scheduled  10. Follow-up with Dr. Analisa Dillard in 6 months, sooner if symptomatic for any reason    I have reviewed my findings and recommendations with patient and family at bedside    Electronically signed by Hailey Martin MD on 8/2/2022 at 1:31 PM  NOTE: This report was transcribed using voice recognition software.  Every effort was made to ensure accuracy; however, inadvertent computerized transcription errors may be present

## 2022-10-07 RX ORDER — POTASSIUM CHLORIDE 1500 MG/1
TABLET, EXTENDED RELEASE ORAL
Qty: 30 TABLET | OUTPATIENT
Start: 2022-10-07

## 2022-10-07 RX ORDER — CARVEDILOL 12.5 MG/1
TABLET ORAL
Qty: 180 TABLET | Refills: 3 | OUTPATIENT
Start: 2022-10-07

## 2022-12-30 RX ORDER — POTASSIUM CHLORIDE 1500 MG/1
TABLET, EXTENDED RELEASE ORAL
Qty: 30 TABLET | OUTPATIENT
Start: 2022-12-30

## 2023-02-27 ENCOUNTER — OFFICE VISIT (OUTPATIENT)
Dept: CARDIOLOGY CLINIC | Age: 78
End: 2023-02-27
Payer: MEDICARE

## 2023-02-27 VITALS
BODY MASS INDEX: 31.69 KG/M2 | SYSTOLIC BLOOD PRESSURE: 136 MMHG | OXYGEN SATURATION: 99 % | RESPIRATION RATE: 16 BRPM | DIASTOLIC BLOOD PRESSURE: 68 MMHG | HEIGHT: 62 IN | WEIGHT: 172.2 LBS | HEART RATE: 55 BPM

## 2023-02-27 DIAGNOSIS — I25.10 CORONARY ARTERY DISEASE INVOLVING NATIVE CORONARY ARTERY OF NATIVE HEART WITHOUT ANGINA PECTORIS: Primary | ICD-10-CM

## 2023-02-27 PROCEDURE — 1123F ACP DISCUSS/DSCN MKR DOCD: CPT | Performed by: INTERNAL MEDICINE

## 2023-02-27 PROCEDURE — 93000 ELECTROCARDIOGRAM COMPLETE: CPT | Performed by: INTERNAL MEDICINE

## 2023-02-27 PROCEDURE — 99214 OFFICE O/P EST MOD 30 MIN: CPT | Performed by: INTERNAL MEDICINE

## 2023-02-27 RX ORDER — ZINC GLUCONATE 50 MG
50 TABLET ORAL DAILY
COMMUNITY

## 2023-02-27 RX ORDER — ASCORBIC ACID 500 MG
500 TABLET ORAL DAILY
COMMUNITY

## 2023-02-27 RX ORDER — WARFARIN SODIUM 3 MG/1
3 TABLET ORAL DAILY
COMMUNITY
Start: 2023-02-23

## 2023-02-27 NOTE — PROGRESS NOTES
[facility] Cardiology Progress Note  Dr. Dioyn Payne      Referring Physician: Isela Monroe MD  CHIEF COMPLAINT:   Chief Complaint   Patient presents with    Coronary Artery Disease     6 month ov. Denies ED/Hosp admits. Recent labs @ PCP's ofc; obtaining. No cardiac complaints. Atrial Fibrillation    Congestive Heart Failure    Cardiomyopathy    Valvular Heart Disease    Hypertension       HISTORY OF PRESENT ILLNESS:   Patient is a 69years old female with a history of CAD status post CABG in 2017, atrial fibrillation, cardiomyopathy, chronic systolic congestive heart failure and hyperlipidemia, is here for a follow up visit. Patient denies any chest pain, no shortness of breath, no lightheadedness, no dizziness, no palpitations, no pedal edema, no PND, no orthopnea, no syncope, no presyncopal episodes.     Past Medical History:   Diagnosis Date    Acute systolic congestive heart failure ([address] 4/24/2017    Atrial fibrillation (HCC)     CAD (coronary artery disease)     History of echocardiogram 04/14/2017    EF 26%    Hyperlipidemia 4/24/2017    Ischemic cardiomyopathy     Non-rheumatic tricuspid valve insufficiency     Nonrheumatic mitral (valve) insufficiency          Past Surgical History:   Procedure Laterality Date    CARDIAC CATHETERIZATION  04/17/2017    Dr. Naveen Varela GRAFT  04/17/2017    DIAGNOSTIC CARDIAC CATH LAB PROCEDURE  04/17/2017    Dr. Emily Posada         Current Outpatient Medications   Medication Sig Dispense Refill    warfarin (COUMADIN) 3 MG tablet Take 3 mg by mouth daily      zinc 50 MG TABS tablet Take 50 mg by mouth daily      vitamin C (ASCORBIC ACID) 500 MG tablet Take 500 mg by mouth daily      furosemide (LASIX) 20 MG tablet TAKE 1 TABLET BY MOUTH EVERY DAY 90 tablet 3    metoprolol succinate (TOPROL XL) 50 MG extended release tablet Take 1 tablet by mouth daily 30 tablet 3    lisinopril (PRINIVIL;ZESTRIL) 2.5 MG tablet TAKE 2 TABLETS BY MOUTH EVERY  tablet 3    docusate sodium (COLACE) 100 MG capsule Take 100 mg by mouth as needed      atorvastatin (LIPITOR) 40 MG tablet Take 1 tablet by mouth daily 30 tablet 3    warfarin (COUMADIN) 5 MG tablet TAKE 1 TABLET BY MOUTH EVERY DAY (Patient not taking: Reported on 2023) 90 tablet 3     No current facility-administered medications for this visit. Allergies as of 2023    (No Known Allergies)       Social History     Socioeconomic History    Marital status:       Spouse name: Not on file    Number of children: Not on file    Years of education: Not on file    Highest education level: Not on file   Occupational History    Not on file   Tobacco Use    Smoking status: Former     Packs/day: 0.50     Years: 20.00     Pack years: 10.00     Types: Cigarettes     Quit date: 2002     Years since quittin.8    Smokeless tobacco: Never   Vaping Use    Vaping Use: Never used   Substance and Sexual Activity    Alcohol use: Not Currently    Drug use: Never    Sexual activity: Never   Other Topics Concern    Not on file   Social History Narrative    Drinks 1 pot of coffee daily     Social Determinants of Health     Financial Resource Strain: Not on file   Food Insecurity: Not on file   Transportation Needs: Not on file   Physical Activity: Not on file   Stress: Not on file   Social Connections: Not on file   Intimate Partner Violence: Not on file   Housing Stability: Not on file       No family Hx for early CAD    REVIEW OF SYSTEMS:   CONSTITUTIONAL:  negative for  fevers, chills, sweats, + fatigue  HEENT:  negative for  tinnitus, earaches, nasal congestion and epistaxis  RESPIRATORY:  negative for  dry cough, cough with sputum, dyspnea, wheezing and hemoptysis  GASTROINTESTINAL:  negative for nausea, vomiting, diarrhea, constipation, pruritus and jaundice  HEMATOLOGIC/LYMPHATIC:  negative for easy bruising, bleeding, lymphadenopathy and petechiae  ENDOCRINE:  negative for heat intolerance, cold intolerance, tremor, hair loss and diabetic symptoms including neither polyuria nor polydipsia nor blurred vision  MUSCULOSKELETAL:  negative for  myalgias, arthralgias, joint swelling, stiff joints and decreased range of motion  NEUROLOGICAL:  negative for memory problems, speech problems, visual disturbance, dysphagia, weakness and numbness      PHYSICAL EXAM:   CONSTITUTIONAL:  awake, alert, cooperative, no apparent distress, and appears stated age  HEAD:  normocepalic, without obvious abnormality, atraumatic, pink, moist mucous membranes. NECK:  Supple, symmetrical, trachea midline, no adenopathy, thyroid symmetric, not enlarged and no tenderness, skin normal  LUNGS:  No increased work of breathing, good air exchange, clear to auscultation bilaterally, no crackles or wheezing  CARDIOVASCULAR:  Normal apical impulse,Irregularly irregular, normal S1 and S2, no S3 , 3/6 systolic murmur at the apex, 3/6 systolic murmur at the left lower sternal border,no JVD, no carotid bruit, + pedal edema, good carotid upstroke bilaterally. ABDOMEN:  Soft, nontender, no masses, no hepatomegaly or splenomegaly, BS+  CHEST: nontender to palpation, expands symmetrically  MUSCULOSKELETAL:  No clubbing no cyanosis. there is no redness, warmth, or swelling of the joints  full range of motion noted  NEUROLOGIC:  Alert, awake,oriented x3  SKIN:  no bruising or bleeding, normal skin color, texture, turgor and no redness, warmth, or swelling    /68 (Site: Right Upper Arm, Position: Sitting, Cuff Size: Medium Adult)   Pulse 55   Resp 16   Ht 5' 2\" (1.575 m)   Wt 172 lb 3.2 oz (78.1 kg)   SpO2 99%   BMI 31.50 kg/m²     DATA:   I personally reviewed the visit EKG with the following interpretation: Atrial fibrillation, slow ventricular response, left bundle branch block    EKG 8/2/2022, atrial fibrillation, with a slow ventricular response, nonspecific interventricular conduction delay, possible old anterior wall MI age undetermined    EKG 1/21/2022, atrial fibrillation, controlled ventricular response, left bundle branch block    EKG 10/8/20 Atrial fibrillation with slow ventricular response, nonspecific intraventricular conduction delay, old inferior wall MI age undetermined       ECHO: 5/27/2022, Summary   Compared to prior echo, changes noted. Technically adequate study. Left ventricle size is normal.   Normal left ventricular wall thickness. Ejection fraction is visually estimated at 50-55%. Septal motion consistent with post bypass surgery. Indeterminate diastolic function. Mild mitral stenosis. Mild to moderate tricuspid regurgitation. RVSP is 48 mmHg. Pulmonary hypertension is mild to moderate . 2/14/20 Summary   No previous echo for comparison. Technically adequate study. Normal left ventricular wall thickness. Ejection fraction is visually estimated at 35%. Abnormal (paradoxical) motion consistent with conduction abnormality. Mild to moderate mitral regurgitation is present. Moderate tricuspid regurgitation.    RVSP is normal.    Angiography: 4/17/17 Findings:  Left main: 70% distal stenosis  LAD: 60% mid stenosis  Circumflex: Luminal irregularities  RCA: 80-90% proximal hazy lesion    CABG 4/20/17 x2, left internal mammary artery to the LAD, saphenous vein graft to the circ posterolateral.     Cardiology Labs: BMP:    Lab Results   Component Value Date/Time     06/16/2022 04:56 AM    K 4.0 06/16/2022 04:56 AM    K 4.1 05/27/2022 04:53 AM     06/16/2022 04:56 AM    CO2 20 06/16/2022 04:56 AM    BUN 11 06/16/2022 04:56 AM    CREATININE 0.7 06/16/2022 04:56 AM     CMP:    Lab Results   Component Value Date/Time     06/16/2022 04:56 AM    K 4.0 06/16/2022 04:56 AM    K 4.1 05/27/2022 04:53 AM     06/16/2022 04:56 AM    CO2 20 06/16/2022 04:56 AM    BUN 11 06/16/2022 04:56 AM    CREATININE 0.7 06/16/2022 04:56 AM    PROT 6.7 06/16/2022 04:56 AM     CBC:    Lab Results Component Value Date/Time    WBC 16.2 06/16/2022 04:56 AM    RBC 3.94 06/16/2022 04:56 AM    HGB 11.9 06/16/2022 04:56 AM    HCT 38.1 06/16/2022 04:56 AM    MCV 96.7 06/16/2022 04:56 AM    RDW 14.4 06/16/2022 04:56 AM     06/16/2022 04:56 AM     PT/INR:  No results found for: PTINR  PT/INR Warfarin:  No components found for: PTPATWAR, PTINRWAR  PTT:    Lab Results   Component Value Date/Time    APTT 25.9 04/20/2017 11:19 AM     PTT Heparin:  No components found for: APTTHEP  Magnesium:    Lab Results   Component Value Date/Time    MG 2.0 05/26/2022 03:46 PM     TSH:    Lab Results   Component Value Date/Time    TSH 4.630 02/14/2020 07:59 AM     TROPONIN:  No components found for: TROP  BNP:    Lab Results   Component Value Date/Time     04/14/2017 07:40 AM     FASTING LIPID PANEL:    Lab Results   Component Value Date/Time    CHOL 108 06/07/2022 06:20 PM    HDL 33 06/07/2022 06:20 PM    TRIG 96 06/07/2022 06:20 PM     No orders to display     I have personally reviewed the laboratory, cardiac diagnostic and radiographic testing as outlined above:      IMPRESSION:  1. Atrial fibrillation: Persistent,  Rate is controlled, [id number] score of 5, on Coumadin. 2.  Diastolic congestive heart failure: Mildly decompensated, will adjust Lasix dose  3. CAD: Status post CABG in April 2017 with a LIMA to LAD, SVG to left PLV, doing well. 4. Cardiomyopathy: Ischemic, resolved  5. Mild mitral valve regurgitation  6. Tricuspid valve regurgitation: Mild to moderate  7. Mitral valve stenosis: Mild  8. Pulmonary hypertension: Mild. 9.  Hypertension: Controlled  10. Chronic anticoagulation: Stated Dr. Jackie Helton follows with INR    RECOMMENDATIONS:   1. Continue current treatment  2. Preventive cardiology: Low-salt, low-cholesterol diet, daily exercise, total cholesterol of less than 200, LDL of less than 70,were all advised.   3.  CHF: Daily weight, take an extra Lasix for weight gain of more than 2-3 pounds in 24 hours, compliance with diuretics, low-salt diet were all advised. 4.  Compliance with the Coumadin dose, PT and INR check appointments was strongly advised  5. Increase risk of bleeding due to being on anti-coagulation, symptoms and signs of bleeding discussed with patient, patient was advised to seek medical attention at the earliest symptoms or signs of bleeding. 6.  Follow-up with Dr. Bartolo Monaco as scheduled  7. Follow-up with Dr. Marko Rincon in 6 months, sooner if symptomatic for any reason    I have reviewed my findings and recommendations with patient and family at bedside    Electronically signed by Tata Encinas MD on 2/27/2023 at 2:04 PM  NOTE: This report was transcribed using voice recognition software.  Every effort was made to ensure accuracy; however, inadvertent computerized transcription errors may be present

## 2023-09-15 ENCOUNTER — OFFICE VISIT (OUTPATIENT)
Dept: CARDIOLOGY CLINIC | Age: 78
End: 2023-09-15
Payer: MEDICARE

## 2023-09-15 VITALS
HEIGHT: 62 IN | WEIGHT: 169.8 LBS | HEART RATE: 63 BPM | RESPIRATION RATE: 18 BRPM | OXYGEN SATURATION: 96 % | BODY MASS INDEX: 31.25 KG/M2 | DIASTOLIC BLOOD PRESSURE: 86 MMHG | SYSTOLIC BLOOD PRESSURE: 120 MMHG

## 2023-09-15 DIAGNOSIS — I25.10 CORONARY ARTERY DISEASE INVOLVING NATIVE CORONARY ARTERY OF NATIVE HEART WITHOUT ANGINA PECTORIS: Primary | ICD-10-CM

## 2023-09-15 PROCEDURE — 99214 OFFICE O/P EST MOD 30 MIN: CPT | Performed by: INTERNAL MEDICINE

## 2023-09-15 PROCEDURE — 1123F ACP DISCUSS/DSCN MKR DOCD: CPT | Performed by: INTERNAL MEDICINE

## 2023-09-15 PROCEDURE — 93000 ELECTROCARDIOGRAM COMPLETE: CPT | Performed by: INTERNAL MEDICINE

## 2023-09-15 NOTE — PROGRESS NOTES
[facility] Progress Note  Dr. Kaleb Doshi      Referring Physician: Linda Gaona MD  CHIEF COMPLAINT:   Chief Complaint   Patient presents with    Coronary Artery Disease     6 mo F/U. Pt has no cardiac complaints. HISTORY OF PRESENT ILLNESS:   Patient is a 69years old female with a history of CAD status post CABG in 2017, atrial fibrillation, cardiomyopathy, chronic systolic congestive heart failure and hyperlipidemia, is here for a follow up visit. Patient denies any chest pain, no shortness of breath, no lightheadedness, no dizziness, no palpitations, no pedal edema, no PND, no orthopnea, no syncope, no presyncopal episodes. Functional capacity is good for age.   (I feel great)    Past Medical History:   Diagnosis Date    Acute systolic congestive heart failure ([address]) 4/24/2017    Atrial fibrillation (HCC)     CAD (coronary artery disease)     History of echocardiogram 04/14/2017    EF 26%    Hyperlipidemia 4/24/2017    Ischemic cardiomyopathy     Non-rheumatic tricuspid valve insufficiency     Nonrheumatic mitral (valve) insufficiency          Past Surgical History:   Procedure Laterality Date    CARDIAC CATHETERIZATION  04/17/2017    Dr. Tuan ECHOLS  04/17/2017    DIAGNOSTIC CARDIAC CATH LAB PROCEDURE  04/17/2017    Dr. Triston Velazquez         Current Outpatient Medications   Medication Sig Dispense Refill    warfarin (COUMADIN) 3 MG tablet Take 1 tablet by mouth daily      zinc 50 MG TABS tablet Take 1 tablet by mouth daily      vitamin C (ASCORBIC ACID) 500 MG tablet Take 1 tablet by mouth daily      furosemide (LASIX) 20 MG tablet TAKE 1 TABLET BY MOUTH EVERY DAY 90 tablet 3    metoprolol succinate (TOPROL XL) 50 MG extended release tablet Take 1 tablet by mouth daily 30 tablet 3    lisinopril (PRINIVIL;ZESTRIL) 2.5 MG tablet TAKE 2 TABLETS BY MOUTH EVERY  tablet 3    docusate sodium (COLACE) 100 MG capsule Take 1 capsule by mouth as needed      atorvastatin

## 2023-10-23 RX ORDER — FUROSEMIDE 20 MG/1
TABLET ORAL
Qty: 90 TABLET | Refills: 3 | Status: SHIPPED | OUTPATIENT
Start: 2023-10-23

## 2024-04-24 NOTE — PROGRESS NOTES
Continue methotrexate 2.5 mg.  Take 8 tablets once a week on Mondays only.  Continue  folic acid 1 mg.  Take 1 tablet daily    Blood tests (CBC, AST, ALT , BUN/cr, ESR, CRP) every 3 months to monitor for bone marrow and liver toxicity due to methotrexate    Methotrexate can increase the risk of infections. One should call us if any infection develops.  Stop using methotrexate if you have signs if infection such as fever, chills, sore throat, flu like symptoms, sores or white patches in your mouth, night sweats, stomach pain, diarrhea, weight loss, skin redness and swelling, cough or chest pain.   Methotrexate may increase cancer risk which may include blood disorders like leukemia. Follow up with primary care doctor for cancer monitoring.  Follow-up with dermatology for skin check for melanoma.  Methotrexate can cause inflammation of the lungs.   All patients taking methotrexate should be taking a daily folic acid.  Avoid alcohol.    Follow up with PCP for assessment of cardiovascular risk factors as rheumatoid arthritis is associated with increased risk of cardiovascular disease.       The following vaccinations are recommended:  Flu vaccine every year  COVID-19 vaccine. Please hold methotrexate for 1 week after COVID-19 vaccine    Follow up in 6 months            Pt has not had much sleep during this shift. Pt has walked around the hallway. Pt continus to refuse heart monitor, iv fluid. Pt is dressed in her personal clothes. No distress observed.

## 2024-04-26 ENCOUNTER — OFFICE VISIT (OUTPATIENT)
Dept: CARDIOLOGY CLINIC | Age: 79
End: 2024-04-26
Payer: MEDICARE

## 2024-04-26 VITALS
HEIGHT: 62 IN | OXYGEN SATURATION: 95 % | SYSTOLIC BLOOD PRESSURE: 118 MMHG | RESPIRATION RATE: 18 BRPM | DIASTOLIC BLOOD PRESSURE: 66 MMHG | BODY MASS INDEX: 30.88 KG/M2 | WEIGHT: 167.8 LBS | HEART RATE: 47 BPM

## 2024-04-26 DIAGNOSIS — I25.10 CORONARY ARTERY DISEASE INVOLVING NATIVE CORONARY ARTERY OF NATIVE HEART WITHOUT ANGINA PECTORIS: Primary | ICD-10-CM

## 2024-04-26 PROCEDURE — 93000 ELECTROCARDIOGRAM COMPLETE: CPT | Performed by: INTERNAL MEDICINE

## 2024-04-26 PROCEDURE — 1123F ACP DISCUSS/DSCN MKR DOCD: CPT | Performed by: INTERNAL MEDICINE

## 2024-04-26 PROCEDURE — 99214 OFFICE O/P EST MOD 30 MIN: CPT | Performed by: INTERNAL MEDICINE

## 2024-04-26 RX ORDER — METOPROLOL SUCCINATE 25 MG/1
25 TABLET, EXTENDED RELEASE ORAL DAILY
Qty: 90 TABLET | Refills: 2 | Status: SHIPPED | OUTPATIENT
Start: 2024-04-26

## 2024-04-26 NOTE — PROGRESS NOTES
[facility] Cardiology Progress Note  Dr. Shira Alas      Referring Physician: Ricky Vogt MD  CHIEF COMPLAINT:   Chief Complaint   Patient presents with    Coronary Artery Disease     6 mo ov. Pt has no cardiac complaints       HISTORY OF PRESENT ILLNESS:   Patient is a 79 years old female with a history of CAD status post CABG in 2017, atrial fibrillation, cardiomyopathy, chronic systolic congestive heart failure and hyperlipidemia, is here for a follow up visit.  Patient denies any chest pain, no shortness of breath, no lightheadedness, no dizziness, no palpitations, no pedal edema, no PND, no orthopnea, no syncope, no presyncopal episodes.  Functional capacity is good for age.    Past Medical History:   Diagnosis Date    Acute systolic congestive heart failure (HCC) 4/24/2017    Atrial fibrillation (HCC)     CAD (coronary artery disease)     History of echocardiogram 04/14/2017    EF 26%    Hyperlipidemia 4/24/2017    Ischemic cardiomyopathy     Non-rheumatic tricuspid valve insufficiency     Nonrheumatic mitral (valve) insufficiency          Past Surgical History:   Procedure Laterality Date    CARDIAC CATHETERIZATION  04/17/2017    Dr. Alas    CORONARY ARTERY BYPASS GRAFT  04/17/2017    DIAGNOSTIC CARDIAC CATH LAB PROCEDURE  04/17/2017    Dr. Alas         Current Outpatient Medications   Medication Sig Dispense Refill    furosemide (LASIX) 20 MG tablet TAKE 1 TABLET BY MOUTH EVERY DAY 90 tablet 3    warfarin (COUMADIN) 3 MG tablet Take 1 tablet by mouth daily      zinc 50 MG TABS tablet Take 1 tablet by mouth daily      vitamin C (ASCORBIC ACID) 500 MG tablet Take 1 tablet by mouth daily      metoprolol succinate (TOPROL XL) 50 MG extended release tablet Take 1 tablet by mouth daily 30 tablet 3    lisinopril (PRINIVIL;ZESTRIL) 2.5 MG tablet TAKE 2 TABLETS BY MOUTH EVERY  tablet 3    docusate sodium (COLACE) 100 MG capsule Take 1 capsule by mouth as needed      atorvastatin (LIPITOR) 40 MG tablet

## 2024-04-29 ENCOUNTER — TELEPHONE (OUTPATIENT)
Dept: CARDIOLOGY CLINIC | Age: 79
End: 2024-04-29

## 2024-04-29 NOTE — TELEPHONE ENCOUNTER
Called and lvm letting patient know  called in different strength tablet for her that way she no longer has to cut the pill in half.

## 2024-04-30 ENCOUNTER — TELEPHONE (OUTPATIENT)
Dept: CARDIOLOGY CLINIC | Age: 79
End: 2024-04-30

## 2024-04-30 NOTE — TELEPHONE ENCOUNTER
Shira Alas MD McGee, Shelia, MA Hi Shelia:  Would you please let her know that I called in for her with the lower dose of metoprolol (25 mg), so she does not have to break her current pills.  When she gets the new prescription should take 1 tablet of the 25 mg 1 by mouth daily  Thank you    Called patient left voicemail

## 2024-05-07 RX ORDER — METOPROLOL SUCCINATE 25 MG/1
25 TABLET, EXTENDED RELEASE ORAL DAILY
Qty: 90 TABLET | Refills: 0 | Status: SHIPPED | OUTPATIENT
Start: 2024-05-07

## 2024-05-28 NOTE — CARE COORDINATION
Transition of care: Met with pt in room. Pt lives with her cat in a basement floor apartment. There are 4 steps to get down to her apartment. Independent with ADLs and drives. No DME. Her DC plan is to return home. She is agreeable to [facility] for CPA and med compliance. Provided her with [facility] list to make choices. PCP is Dr. Joselo Cat and he manages her coumadin. Pharmacy is [facility] on [address]. Sw/cm will follow   The Plan for Transition of Care is related to the following treatment goals: [facility]  The Patient was provided with a choice of provider and agrees   with the discharge plan. [x] Yes [] No  Freedom of choice list was provided with basic dialogue that supports the patient's individualized plan of care/goals, treatment preferences and shares the quality data associated with the providers. [x] Yes [] No    1340  Pt requested [facility]. Referral made to [facility] at [facility] and they accepted pt. Address: [address] [place] [address] [address] given to [facility].
My signature below certifies that the above stated patient is homebound and upon completion of the Face-To-Face encounter, has the need for intermittent skilled nursing, physical therapy and/or speech or occupational therapy services in their home for their current diagnosis as outlined in their initial plan of care. These services will continue to be monitored by myself or another physician.

## 2024-06-20 RX ORDER — METOPROLOL SUCCINATE 25 MG/1
25 TABLET, EXTENDED RELEASE ORAL DAILY
Qty: 90 TABLET | Refills: 1 | Status: SHIPPED | OUTPATIENT
Start: 2024-06-20

## 2024-10-18 ENCOUNTER — OFFICE VISIT (OUTPATIENT)
Dept: CARDIOLOGY CLINIC | Age: 79
End: 2024-10-18
Payer: MEDICARE

## 2024-10-18 VITALS
DIASTOLIC BLOOD PRESSURE: 64 MMHG | BODY MASS INDEX: 30.36 KG/M2 | RESPIRATION RATE: 16 BRPM | WEIGHT: 165 LBS | SYSTOLIC BLOOD PRESSURE: 132 MMHG | HEIGHT: 62 IN | HEART RATE: 66 BPM

## 2024-10-18 DIAGNOSIS — I50.32 CHRONIC DIASTOLIC CONGESTIVE HEART FAILURE (HCC): Primary | ICD-10-CM

## 2024-10-18 PROCEDURE — 99214 OFFICE O/P EST MOD 30 MIN: CPT | Performed by: INTERNAL MEDICINE

## 2024-10-18 PROCEDURE — 93000 ELECTROCARDIOGRAM COMPLETE: CPT | Performed by: INTERNAL MEDICINE

## 2024-10-18 PROCEDURE — 1123F ACP DISCUSS/DSCN MKR DOCD: CPT | Performed by: INTERNAL MEDICINE

## 2024-10-18 NOTE — PROGRESS NOTES
[facility] Cardiology Progress Note  Dr. Shira Alas      Referring Physician: Ricky Vogt MD  CHIEF COMPLAINT:   Chief Complaint   Patient presents with    Congestive Heart Failure     No c/o        HISTORY OF PRESENT ILLNESS:   Patient is a 79 years old female with a history of CAD status post CABG in 2017, atrial fibrillation, cardiomyopathy, chronic systolic congestive heart failure and hyperlipidemia, is here for a follow up visit.  Patient denies any chest pain, no shortness of breath, no lightheadedness, no dizziness, no palpitations, no pedal edema, no PND, no orthopnea, no syncope, no presyncopal episodes.  Functional capacity is good for age.    Past Medical History:   Diagnosis Date    Acute systolic congestive heart failure (HCC) 4/24/2017    Atrial fibrillation (HCC)     CAD (coronary artery disease)     History of echocardiogram 04/14/2017    EF 26%    Hyperlipidemia 4/24/2017    Ischemic cardiomyopathy     Non-rheumatic tricuspid valve insufficiency     Nonrheumatic mitral (valve) insufficiency          Past Surgical History:   Procedure Laterality Date    CARDIAC CATHETERIZATION  04/17/2017    Dr. Alas    CORONARY ARTERY BYPASS GRAFT  04/17/2017    DIAGNOSTIC CARDIAC CATH LAB PROCEDURE  04/17/2017    Dr. Alas         Current Outpatient Medications   Medication Sig Dispense Refill    metoprolol succinate (TOPROL XL) 25 MG extended release tablet TAKE 1 TABLET BY MOUTH EVERY DAY 90 tablet 1    furosemide (LASIX) 20 MG tablet TAKE 1 TABLET BY MOUTH EVERY DAY 90 tablet 3    warfarin (COUMADIN) 3 MG tablet Take 1 tablet by mouth daily      zinc 50 MG TABS tablet Take 1 tablet by mouth daily      vitamin C (ASCORBIC ACID) 500 MG tablet Take 1 tablet by mouth daily      lisinopril (PRINIVIL;ZESTRIL) 2.5 MG tablet TAKE 2 TABLETS BY MOUTH EVERY  tablet 3    docusate sodium (COLACE) 100 MG capsule Take 1 capsule by mouth as needed      atorvastatin (LIPITOR) 40 MG tablet Take 1 tablet by mouth

## 2025-02-10 RX ORDER — METOPROLOL SUCCINATE 25 MG/1
25 TABLET, EXTENDED RELEASE ORAL DAILY
Qty: 90 TABLET | Refills: 3 | Status: SHIPPED | OUTPATIENT
Start: 2025-02-10

## 2025-05-23 ENCOUNTER — OFFICE VISIT (OUTPATIENT)
Dept: CARDIOLOGY CLINIC | Age: 80
End: 2025-05-23
Payer: MEDICARE

## 2025-05-23 VITALS
HEIGHT: 62 IN | HEART RATE: 61 BPM | BODY MASS INDEX: 30.66 KG/M2 | WEIGHT: 166.6 LBS | DIASTOLIC BLOOD PRESSURE: 76 MMHG | SYSTOLIC BLOOD PRESSURE: 118 MMHG | RESPIRATION RATE: 18 BRPM

## 2025-05-23 DIAGNOSIS — I25.10 CORONARY ARTERY DISEASE INVOLVING NATIVE CORONARY ARTERY OF NATIVE HEART WITHOUT ANGINA PECTORIS: ICD-10-CM

## 2025-05-23 DIAGNOSIS — I50.32 CHRONIC DIASTOLIC CONGESTIVE HEART FAILURE (HCC): Primary | ICD-10-CM

## 2025-05-23 PROCEDURE — 1123F ACP DISCUSS/DSCN MKR DOCD: CPT | Performed by: INTERNAL MEDICINE

## 2025-05-23 PROCEDURE — 93000 ELECTROCARDIOGRAM COMPLETE: CPT | Performed by: INTERNAL MEDICINE

## 2025-05-23 PROCEDURE — 99214 OFFICE O/P EST MOD 30 MIN: CPT | Performed by: INTERNAL MEDICINE

## 2025-05-23 PROCEDURE — 1160F RVW MEDS BY RX/DR IN RCRD: CPT | Performed by: INTERNAL MEDICINE

## 2025-05-23 PROCEDURE — 1159F MED LIST DOCD IN RCRD: CPT | Performed by: INTERNAL MEDICINE

## 2025-05-23 NOTE — PROGRESS NOTES
[facility] Cardiology Progress Note  Dr. Shira Alas      Referring Physician: Ricky Vogt MD  CHIEF COMPLAINT:   Chief Complaint   Patient presents with    Congestive Heart Failure     6 month OV- Patient has no cardiac complaints    Coronary Artery Disease       HISTORY OF PRESENT ILLNESS:   Patient is a 80 years old female with a history of CAD status post CABG in 2017, atrial fibrillation, cardiomyopathy, chronic systolic congestive heart failure and hyperlipidemia, is here for a follow up visit.  Patient denies any chest pain, no shortness of breath, no lightheadedness, no dizziness, no palpitations, no pedal edema, no PND, no orthopnea, no syncope, no presyncopal episodes.  Functional capacity is good for age.  (I feel good)    Past Medical History:   Diagnosis Date    Acute systolic congestive heart failure (HCC) 4/24/2017    Atrial fibrillation (HCC)     CAD (coronary artery disease)     History of echocardiogram 04/14/2017    EF 26%    Hyperlipidemia 4/24/2017    Ischemic cardiomyopathy     Non-rheumatic tricuspid valve insufficiency     Nonrheumatic mitral (valve) insufficiency          Past Surgical History:   Procedure Laterality Date    CARDIAC CATHETERIZATION  04/17/2017    Dr. Alas    CORONARY ARTERY BYPASS GRAFT  04/17/2017    DIAGNOSTIC CARDIAC CATH LAB PROCEDURE  04/17/2017    Dr. Alas         Current Outpatient Medications   Medication Sig Dispense Refill    metoprolol succinate (TOPROL XL) 25 MG extended release tablet Take 1 tablet by mouth daily 90 tablet 3    furosemide (LASIX) 20 MG tablet TAKE 1 TABLET BY MOUTH EVERY DAY 90 tablet 3    warfarin (COUMADIN) 3 MG tablet Take 1 tablet by mouth daily      zinc 50 MG TABS tablet Take 1 tablet by mouth daily      vitamin C (ASCORBIC ACID) 500 MG tablet Take 1 tablet by mouth daily      lisinopril (PRINIVIL;ZESTRIL) 2.5 MG tablet TAKE 2 TABLETS BY MOUTH EVERY DAY (Patient taking differently: Take 2 tablets by mouth daily) 180 tablet 3